# Patient Record
Sex: FEMALE | Race: WHITE | NOT HISPANIC OR LATINO | Employment: UNEMPLOYED | URBAN - METROPOLITAN AREA
[De-identification: names, ages, dates, MRNs, and addresses within clinical notes are randomized per-mention and may not be internally consistent; named-entity substitution may affect disease eponyms.]

---

## 2017-05-08 ENCOUNTER — TELEPHONE (OUTPATIENT)
Dept: FAMILY MEDICINE | Facility: CLINIC | Age: 62
End: 2017-05-08

## 2017-05-08 NOTE — TELEPHONE ENCOUNTER
----- Message from Noemi Biggs sent at 5/1/2017 12:19 PM CDT -----  Contact: self  Patient just diagnosed with breast cancer, is new to area, and needs a pre-op physical.  Surgery is scheduled for 5/11,however paperwork needs to be completed by the 9th.  First available is 5/31.  Please call back at  work

## 2017-06-19 PROBLEM — C50.912 MALIGNANT NEOPLASM OF LEFT FEMALE BREAST: Status: ACTIVE | Noted: 2017-06-19

## 2017-07-06 ENCOUNTER — LAB VISIT (OUTPATIENT)
Dept: LAB | Facility: HOSPITAL | Age: 62
End: 2017-07-06
Attending: INTERNAL MEDICINE
Payer: COMMERCIAL

## 2017-07-06 DIAGNOSIS — C50.912 MALIGNANT NEOPLASM OF LEFT FEMALE BREAST, UNSPECIFIED SITE OF BREAST: ICD-10-CM

## 2017-07-06 LAB
ALBUMIN SERPL BCP-MCNC: 4.2 G/DL
ALP SERPL-CCNC: 82 U/L
ALT SERPL W/O P-5'-P-CCNC: 62 U/L
ANION GAP SERPL CALC-SCNC: 7 MMOL/L
AST SERPL-CCNC: 37 U/L
BASOPHILS # BLD AUTO: 0.04 K/UL
BASOPHILS NFR BLD: 0.5 %
BILIRUB SERPL-MCNC: 0.8 MG/DL
BUN SERPL-MCNC: 10 MG/DL
CALCIUM SERPL-MCNC: 9.1 MG/DL
CANCER AG15-3 SERPL-ACNC: 26.5 U/ML
CEA SERPL-MCNC: 0.7 NG/ML
CHLORIDE SERPL-SCNC: 102 MMOL/L
CO2 SERPL-SCNC: 30 MMOL/L
CREAT SERPL-MCNC: 0.56 MG/DL
CREAT SERPL-MCNC: 0.56 MG/DL
DIFFERENTIAL METHOD: ABNORMAL
EOSINOPHIL # BLD AUTO: 0.5 K/UL
EOSINOPHIL NFR BLD: 6.9 %
ERYTHROCYTE [DISTWIDTH] IN BLOOD BY AUTOMATED COUNT: 14.4 %
EST. GFR  (AFRICAN AMERICAN): >60 ML/MIN/1.73 M^2
EST. GFR  (AFRICAN AMERICAN): >60 ML/MIN/1.73 M^2
EST. GFR  (NON AFRICAN AMERICAN): >60 ML/MIN/1.73 M^2
EST. GFR  (NON AFRICAN AMERICAN): >60 ML/MIN/1.73 M^2
GLUCOSE SERPL-MCNC: 89 MG/DL
HCT VFR BLD AUTO: 39.9 %
HGB BLD-MCNC: 13.1 G/DL
LYMPHOCYTES # BLD AUTO: 2.2 K/UL
LYMPHOCYTES NFR BLD: 29.4 %
MCH RBC QN AUTO: 33 PG
MCHC RBC AUTO-ENTMCNC: 32.8 %
MCV RBC AUTO: 101 FL
MONOCYTES # BLD AUTO: 0.7 K/UL
MONOCYTES NFR BLD: 8.8 %
NEUTROPHILS # BLD AUTO: 4 K/UL
NEUTROPHILS NFR BLD: 54.4 %
NRBC BLD-RTO: 0 /100 WBC
PLATELET # BLD AUTO: 253 K/UL
PMV BLD AUTO: 10.3 FL
POTASSIUM SERPL-SCNC: 4.7 MMOL/L
PROT SERPL-MCNC: 7.6 G/DL
RBC # BLD AUTO: 3.97 M/UL
SODIUM SERPL-SCNC: 139 MMOL/L
WBC # BLD AUTO: 7.41 K/UL

## 2017-07-06 PROCEDURE — 85025 COMPLETE CBC W/AUTO DIFF WBC: CPT

## 2017-07-06 PROCEDURE — 80053 COMPREHEN METABOLIC PANEL: CPT | Mod: PN

## 2017-07-06 PROCEDURE — 86300 IMMUNOASSAY TUMOR CA 15-3: CPT

## 2017-07-06 PROCEDURE — 86300 IMMUNOASSAY TUMOR CA 15-3: CPT | Mod: 91

## 2017-07-06 PROCEDURE — 80053 COMPREHEN METABOLIC PANEL: CPT

## 2017-07-06 PROCEDURE — 82378 CARCINOEMBRYONIC ANTIGEN: CPT | Mod: PN

## 2017-07-06 PROCEDURE — 85025 COMPLETE CBC W/AUTO DIFF WBC: CPT | Mod: PN

## 2017-07-06 PROCEDURE — 82378 CARCINOEMBRYONIC ANTIGEN: CPT

## 2017-07-06 PROCEDURE — 36415 COLL VENOUS BLD VENIPUNCTURE: CPT | Mod: PN

## 2017-07-06 PROCEDURE — 86300 IMMUNOASSAY TUMOR CA 15-3: CPT | Mod: PN

## 2017-07-11 LAB — CANCER AG27-29 SERPL-ACNC: 25.3 U/ML

## 2017-07-12 PROBLEM — R91.8 LUNG MASS: Status: ACTIVE | Noted: 2017-07-12

## 2017-07-27 ENCOUNTER — TELEPHONE (OUTPATIENT)
Dept: PHARMACY | Facility: CLINIC | Age: 62
End: 2017-07-27

## 2017-07-27 NOTE — TELEPHONE ENCOUNTER
Santy: Masoud- Reach out to insurance company to check on the specialty medication for Ibrance- required prior authorization with insurance company. She confirmed it was just a transition fill which will required PA. Submit a prior authorization over the phone (urgent) along with a decision within 24 hours.     Case ID# [id number]   L @1:00pm

## 2017-07-27 NOTE — TELEPHONE ENCOUNTER
Informed patient we have received an order from your provider for Ibrance and will contact you once a benefits investigation is complete with copay information to set up pickup or shipment and [facility] consultation.  feel free to give us a call with  any questions prior to hearing back from us at [phone]._ 7/27/17

## 2017-08-07 ENCOUNTER — LAB VISIT (OUTPATIENT)
Dept: LAB | Facility: HOSPITAL | Age: 62
End: 2017-08-07
Attending: INTERNAL MEDICINE
Payer: COMMERCIAL

## 2017-08-07 DIAGNOSIS — Z17.0 MALIGNANT NEOPLASM OF BREAST IN FEMALE, ESTROGEN RECEPTOR POSITIVE, UNSPECIFIED LATERALITY, UNSPECIFIED SITE OF BREAST: ICD-10-CM

## 2017-08-07 DIAGNOSIS — C50.919 MALIGNANT NEOPLASM OF BREAST IN FEMALE, ESTROGEN RECEPTOR POSITIVE, UNSPECIFIED LATERALITY, UNSPECIFIED SITE OF BREAST: ICD-10-CM

## 2017-08-07 LAB
BASOPHILS # BLD AUTO: 0.04 K/UL
BASOPHILS NFR BLD: 0.6 %
DIFFERENTIAL METHOD: ABNORMAL
EOSINOPHIL # BLD AUTO: 0.3 K/UL
EOSINOPHIL NFR BLD: 4.2 %
ERYTHROCYTE [DISTWIDTH] IN BLOOD BY AUTOMATED COUNT: 13.4 %
HCT VFR BLD AUTO: 38 %
HGB BLD-MCNC: 12.7 G/DL
LYMPHOCYTES # BLD AUTO: 2.2 K/UL
LYMPHOCYTES NFR BLD: 31.2 %
MCH RBC QN AUTO: 33.2 PG
MCHC RBC AUTO-ENTMCNC: 33.4 G/DL
MCV RBC AUTO: 100 FL
MONOCYTES # BLD AUTO: 0.2 K/UL
MONOCYTES NFR BLD: 3.2 %
NEUTROPHILS # BLD AUTO: 4.2 K/UL
NEUTROPHILS NFR BLD: 60.8 %
NRBC BLD-RTO: 0 /100 WBC
PLATELET # BLD AUTO: 277 K/UL
PMV BLD AUTO: 10.3 FL
RBC # BLD AUTO: 3.82 M/UL
WBC # BLD AUTO: 6.93 K/UL

## 2017-08-07 PROCEDURE — 85025 COMPLETE CBC W/AUTO DIFF WBC: CPT | Mod: PN

## 2017-08-07 PROCEDURE — 85025 COMPLETE CBC W/AUTO DIFF WBC: CPT

## 2017-08-07 PROCEDURE — 36415 COLL VENOUS BLD VENIPUNCTURE: CPT | Mod: PN

## 2017-08-11 PROBLEM — C50.112 MALIGNANT NEOPLASM OF CENTRAL PORTION OF LEFT BREAST IN FEMALE, ESTROGEN RECEPTOR POSITIVE: Status: ACTIVE | Noted: 2017-08-11

## 2017-08-11 PROBLEM — C77.1: Status: ACTIVE | Noted: 2017-08-11

## 2017-08-11 PROBLEM — C50.919: Status: ACTIVE | Noted: 2017-08-11

## 2017-08-11 PROBLEM — Z17.0 MALIGNANT NEOPLASM OF CENTRAL PORTION OF LEFT BREAST IN FEMALE, ESTROGEN RECEPTOR POSITIVE: Status: ACTIVE | Noted: 2017-08-11

## 2017-08-14 ENCOUNTER — INFUSION (OUTPATIENT)
Dept: INFUSION THERAPY | Facility: HOSPITAL | Age: 62
End: 2017-08-14
Attending: INTERNAL MEDICINE
Payer: COMMERCIAL

## 2017-08-14 VITALS
TEMPERATURE: 97 F | SYSTOLIC BLOOD PRESSURE: 147 MMHG | RESPIRATION RATE: 16 BRPM | HEART RATE: 76 BPM | BODY MASS INDEX: 29.88 KG/M2 | WEIGHT: 175 LBS | DIASTOLIC BLOOD PRESSURE: 87 MMHG | HEIGHT: 64 IN

## 2017-08-14 DIAGNOSIS — Z17.0 MALIGNANT NEOPLASM OF LEFT BREAST IN FEMALE, ESTROGEN RECEPTOR POSITIVE, UNSPECIFIED SITE OF BREAST: ICD-10-CM

## 2017-08-14 DIAGNOSIS — Z17.0 MALIGNANT NEOPLASM OF CENTRAL PORTION OF LEFT BREAST IN FEMALE, ESTROGEN RECEPTOR POSITIVE: Primary | ICD-10-CM

## 2017-08-14 DIAGNOSIS — C50.912 MALIGNANT NEOPLASM OF LEFT BREAST IN FEMALE, ESTROGEN RECEPTOR POSITIVE, UNSPECIFIED SITE OF BREAST: ICD-10-CM

## 2017-08-14 DIAGNOSIS — C50.112 MALIGNANT NEOPLASM OF CENTRAL PORTION OF LEFT BREAST IN FEMALE, ESTROGEN RECEPTOR POSITIVE: Primary | ICD-10-CM

## 2017-08-14 PROCEDURE — 96402 CHEMO HORMON ANTINEOPL SQ/IM: CPT | Mod: PN

## 2017-08-14 PROCEDURE — 63600175 PHARM REV CODE 636 W HCPCS: Mod: PN | Performed by: INTERNAL MEDICINE

## 2017-08-14 RX ORDER — LAMOTRIGINE 25 MG/1
500 TABLET ORAL
Status: COMPLETED | OUTPATIENT
Start: 2017-08-14 | End: 2017-08-14

## 2017-08-14 RX ADMIN — FULVESTRANT 500 MG: 50 INJECTION INTRAMUSCULAR at 11:08

## 2017-08-14 NOTE — PLAN OF CARE
Problem: Patient Care Overview  Goal: Discharge Needs Assessment  Tolerated injection without any distress.  Reviewed upcoming appointments.  Amb off unit independently by self.

## 2017-08-21 ENCOUNTER — LAB VISIT (OUTPATIENT)
Dept: LAB | Facility: HOSPITAL | Age: 62
End: 2017-08-21
Attending: PHYSICIAN ASSISTANT
Payer: COMMERCIAL

## 2017-08-21 DIAGNOSIS — Z17.0 MALIGNANT NEOPLASM OF BREAST IN FEMALE, ESTROGEN RECEPTOR POSITIVE, UNSPECIFIED LATERALITY, UNSPECIFIED SITE OF BREAST: ICD-10-CM

## 2017-08-21 DIAGNOSIS — C50.919 MALIGNANT NEOPLASM OF BREAST IN FEMALE, ESTROGEN RECEPTOR POSITIVE, UNSPECIFIED LATERALITY, UNSPECIFIED SITE OF BREAST: ICD-10-CM

## 2017-08-21 LAB
ALBUMIN SERPL BCP-MCNC: 3.6 G/DL
ALP SERPL-CCNC: 59 U/L
ALT SERPL W/O P-5'-P-CCNC: 34 U/L
ANION GAP SERPL CALC-SCNC: 6 MMOL/L
AST SERPL-CCNC: 29 U/L
BASOPHILS # BLD AUTO: 0.01 K/UL
BASOPHILS NFR BLD: 0.2 %
BILIRUB SERPL-MCNC: 0.7 MG/DL
BUN SERPL-MCNC: 12 MG/DL
CALCIUM SERPL-MCNC: 9 MG/DL
CHLORIDE SERPL-SCNC: 98 MMOL/L
CO2 SERPL-SCNC: 29 MMOL/L
CREAT SERPL-MCNC: 0.75 MG/DL
DIFFERENTIAL METHOD: ABNORMAL
EOSINOPHIL # BLD AUTO: 0.2 K/UL
EOSINOPHIL NFR BLD: 4.4 %
ERYTHROCYTE [DISTWIDTH] IN BLOOD BY AUTOMATED COUNT: 14.1 %
EST. GFR  (AFRICAN AMERICAN): >60 ML/MIN/1.73 M^2
EST. GFR  (NON AFRICAN AMERICAN): >60 ML/MIN/1.73 M^2
GLUCOSE SERPL-MCNC: 107 MG/DL
HCT VFR BLD AUTO: 30.9 %
HGB BLD-MCNC: 10.3 G/DL
LYMPHOCYTES # BLD AUTO: 1.9 K/UL
LYMPHOCYTES NFR BLD: 39 %
MAGNESIUM SERPL-MCNC: 1.6 MG/DL
MCH RBC QN AUTO: 33.4 PG
MCHC RBC AUTO-ENTMCNC: 33.3 G/DL
MCV RBC AUTO: 100 FL
MONOCYTES # BLD AUTO: 0.2 K/UL
MONOCYTES NFR BLD: 3.7 %
NEUTROPHILS # BLD AUTO: 2.5 K/UL
NEUTROPHILS NFR BLD: 52.7 %
NRBC BLD-RTO: 0 /100 WBC
PLATELET # BLD AUTO: 219 K/UL
PMV BLD AUTO: 9.9 FL
POTASSIUM SERPL-SCNC: 4.8 MMOL/L
PROT SERPL-MCNC: 7.2 G/DL
RBC # BLD AUTO: 3.08 M/UL
SODIUM SERPL-SCNC: 133 MMOL/L
WBC # BLD AUTO: 4.82 K/UL

## 2017-08-21 PROCEDURE — 36415 COLL VENOUS BLD VENIPUNCTURE: CPT | Mod: PN

## 2017-08-21 PROCEDURE — 83735 ASSAY OF MAGNESIUM: CPT | Mod: PN

## 2017-08-21 PROCEDURE — 80053 COMPREHEN METABOLIC PANEL: CPT | Mod: PN

## 2017-08-21 PROCEDURE — 85025 COMPLETE CBC W/AUTO DIFF WBC: CPT | Mod: PN

## 2017-08-21 PROCEDURE — 83735 ASSAY OF MAGNESIUM: CPT

## 2017-08-21 PROCEDURE — 85025 COMPLETE CBC W/AUTO DIFF WBC: CPT

## 2017-08-21 PROCEDURE — 80053 COMPREHEN METABOLIC PANEL: CPT

## 2017-08-28 ENCOUNTER — INFUSION (OUTPATIENT)
Dept: INFUSION THERAPY | Facility: HOSPITAL | Age: 62
End: 2017-08-28
Attending: INTERNAL MEDICINE
Payer: COMMERCIAL

## 2017-08-28 VITALS
BODY MASS INDEX: 28.7 KG/M2 | TEMPERATURE: 99 F | WEIGHT: 168.13 LBS | RESPIRATION RATE: 16 BRPM | SYSTOLIC BLOOD PRESSURE: 132 MMHG | HEART RATE: 104 BPM | HEIGHT: 64 IN | DIASTOLIC BLOOD PRESSURE: 83 MMHG

## 2017-08-28 DIAGNOSIS — Z17.0 MALIGNANT NEOPLASM OF LEFT BREAST IN FEMALE, ESTROGEN RECEPTOR POSITIVE, UNSPECIFIED SITE OF BREAST: Primary | ICD-10-CM

## 2017-08-28 DIAGNOSIS — C50.912 MALIGNANT NEOPLASM OF LEFT BREAST IN FEMALE, ESTROGEN RECEPTOR POSITIVE, UNSPECIFIED SITE OF BREAST: Primary | ICD-10-CM

## 2017-08-28 PROCEDURE — 63600175 PHARM REV CODE 636 W HCPCS: Mod: PN | Performed by: PHYSICIAN ASSISTANT

## 2017-08-28 PROCEDURE — 96402 CHEMO HORMON ANTINEOPL SQ/IM: CPT | Mod: PN

## 2017-08-28 RX ORDER — LAMOTRIGINE 25 MG/1
500 TABLET ORAL
Status: COMPLETED | OUTPATIENT
Start: 2017-08-28 | End: 2017-08-28

## 2017-08-28 RX ADMIN — FULVESTRANT 500 MG: 50 INJECTION INTRAMUSCULAR at 08:08

## 2017-08-28 NOTE — NURSING
Fasoldex injections given per both butoocks. Tolerated treatment. Pt complains of left calf pain. Leg slightly more swollen that right leg. Dr Teague's office notified. Pt to have doppler study done now in outpatient pavilion.

## 2017-09-11 ENCOUNTER — LAB VISIT (OUTPATIENT)
Dept: LAB | Facility: HOSPITAL | Age: 62
End: 2017-09-11
Attending: INTERNAL MEDICINE
Payer: COMMERCIAL

## 2017-09-11 ENCOUNTER — INFUSION (OUTPATIENT)
Dept: INFUSION THERAPY | Facility: HOSPITAL | Age: 62
End: 2017-09-11
Attending: INTERNAL MEDICINE
Payer: COMMERCIAL

## 2017-09-11 VITALS
HEIGHT: 64 IN | HEART RATE: 106 BPM | BODY MASS INDEX: 27.6 KG/M2 | RESPIRATION RATE: 18 BRPM | SYSTOLIC BLOOD PRESSURE: 129 MMHG | OXYGEN SATURATION: 99 % | TEMPERATURE: 98 F | DIASTOLIC BLOOD PRESSURE: 89 MMHG | WEIGHT: 161.69 LBS

## 2017-09-11 DIAGNOSIS — Z17.0 MALIGNANT NEOPLASM OF LEFT BREAST IN FEMALE, ESTROGEN RECEPTOR POSITIVE, UNSPECIFIED SITE OF BREAST: Primary | ICD-10-CM

## 2017-09-11 DIAGNOSIS — Z17.0 MALIGNANT NEOPLASM OF BREAST IN FEMALE, ESTROGEN RECEPTOR POSITIVE, UNSPECIFIED LATERALITY, UNSPECIFIED SITE OF BREAST: ICD-10-CM

## 2017-09-11 DIAGNOSIS — C50.919 MALIGNANT NEOPLASM OF BREAST IN FEMALE, ESTROGEN RECEPTOR POSITIVE, UNSPECIFIED LATERALITY, UNSPECIFIED SITE OF BREAST: ICD-10-CM

## 2017-09-11 DIAGNOSIS — C50.912 MALIGNANT NEOPLASM OF LEFT BREAST IN FEMALE, ESTROGEN RECEPTOR POSITIVE, UNSPECIFIED SITE OF BREAST: Primary | ICD-10-CM

## 2017-09-11 LAB
ANISOCYTOSIS BLD QL SMEAR: SLIGHT
BASOPHILS # BLD AUTO: 0.03 K/UL
BASOPHILS NFR BLD: 1.2 %
DIFFERENTIAL METHOD: ABNORMAL
EOSINOPHIL # BLD AUTO: 0.1 K/UL
EOSINOPHIL NFR BLD: 3.9 %
ERYTHROCYTE [DISTWIDTH] IN BLOOD BY AUTOMATED COUNT: 15.9 %
GIANT PLATELETS BLD QL SMEAR: PRESENT
HCT VFR BLD AUTO: 38 %
HGB BLD-MCNC: 13 G/DL
LYMPHOCYTES # BLD AUTO: 1.2 K/UL
LYMPHOCYTES NFR BLD: 48.1 %
MCH RBC QN AUTO: 32.4 PG
MCHC RBC AUTO-ENTMCNC: 34.2 G/DL
MCV RBC AUTO: 95 FL
MONOCYTES # BLD AUTO: 0.1 K/UL
MONOCYTES NFR BLD: 4.3 %
NEUTROPHILS # BLD AUTO: 1.1 K/UL
NEUTROPHILS NFR BLD: 42.5 %
NRBC BLD-RTO: 0 /100 WBC
PLATELET # BLD AUTO: 288 K/UL
PMV BLD AUTO: 10.2 FL
RBC # BLD AUTO: 4.01 M/UL
WBC # BLD AUTO: 2.58 K/UL

## 2017-09-11 PROCEDURE — 36415 COLL VENOUS BLD VENIPUNCTURE: CPT | Mod: PN

## 2017-09-11 PROCEDURE — 85025 COMPLETE CBC W/AUTO DIFF WBC: CPT

## 2017-09-11 PROCEDURE — 63600175 PHARM REV CODE 636 W HCPCS: Mod: PN | Performed by: PHYSICIAN ASSISTANT

## 2017-09-11 PROCEDURE — 96402 CHEMO HORMON ANTINEOPL SQ/IM: CPT | Mod: PN

## 2017-09-11 PROCEDURE — 85025 COMPLETE CBC W/AUTO DIFF WBC: CPT | Mod: PN

## 2017-09-11 RX ORDER — LAMOTRIGINE 25 MG/1
500 TABLET ORAL
Status: COMPLETED | OUTPATIENT
Start: 2017-09-11 | End: 2017-09-11

## 2017-09-11 RX ADMIN — FULVESTRANT 500 MG: 50 INJECTION INTRAMUSCULAR at 08:09

## 2017-09-12 ENCOUNTER — LAB VISIT (OUTPATIENT)
Dept: LAB | Facility: HOSPITAL | Age: 62
End: 2017-09-12
Attending: INTERNAL MEDICINE
Payer: COMMERCIAL

## 2017-09-12 DIAGNOSIS — C50.919 BREAST CANCER METASTASIZED TO INTRATHORACIC LYMPH NODE, UNSPECIFIED LATERALITY: ICD-10-CM

## 2017-09-12 DIAGNOSIS — C77.1 BREAST CANCER METASTASIZED TO INTRATHORACIC LYMPH NODE, UNSPECIFIED LATERALITY: ICD-10-CM

## 2017-09-12 DIAGNOSIS — C50.112 MALIGNANT NEOPLASM OF CENTRAL PORTION OF LEFT BREAST IN FEMALE, ESTROGEN RECEPTOR POSITIVE: ICD-10-CM

## 2017-09-12 DIAGNOSIS — Z17.0 MALIGNANT NEOPLASM OF CENTRAL PORTION OF LEFT BREAST IN FEMALE, ESTROGEN RECEPTOR POSITIVE: ICD-10-CM

## 2017-09-12 LAB
ALBUMIN SERPL BCP-MCNC: 4 G/DL
ALP SERPL-CCNC: 87 U/L
ALT SERPL W/O P-5'-P-CCNC: 29 U/L
ANION GAP SERPL CALC-SCNC: 10 MMOL/L
ANISOCYTOSIS BLD QL SMEAR: SLIGHT
AST SERPL-CCNC: 28 U/L
BASOPHILS # BLD AUTO: 0.04 K/UL
BASOPHILS NFR BLD: 1 %
BILIRUB SERPL-MCNC: 0.6 MG/DL
BUN SERPL-MCNC: 17 MG/DL
CALCIUM SERPL-MCNC: 9.7 MG/DL
CANCER AG15-3 SERPL-ACNC: 27 U/ML
CEA SERPL-MCNC: 1.1 NG/ML
CHLORIDE SERPL-SCNC: 102 MMOL/L
CO2 SERPL-SCNC: 25 MMOL/L
CREAT SERPL-MCNC: 0.8 MG/DL
DIFFERENTIAL METHOD: ABNORMAL
EOSINOPHIL # BLD AUTO: 0.2 K/UL
EOSINOPHIL NFR BLD: 4.1 %
ERYTHROCYTE [DISTWIDTH] IN BLOOD BY AUTOMATED COUNT: 16 %
EST. GFR  (AFRICAN AMERICAN): >60 ML/MIN/1.73 M^2
EST. GFR  (NON AFRICAN AMERICAN): >60 ML/MIN/1.73 M^2
GIANT PLATELETS BLD QL SMEAR: PRESENT
GLUCOSE SERPL-MCNC: 94 MG/DL
HCT VFR BLD AUTO: 38.3 %
HGB BLD-MCNC: 13.1 G/DL
LYMPHOCYTES # BLD AUTO: 1.7 K/UL
LYMPHOCYTES NFR BLD: 42.6 %
MAGNESIUM SERPL-MCNC: 1.8 MG/DL
MCH RBC QN AUTO: 32.3 PG
MCHC RBC AUTO-ENTMCNC: 34.2 G/DL
MCV RBC AUTO: 95 FL
MONOCYTES # BLD AUTO: 0.2 K/UL
MONOCYTES NFR BLD: 4.1 %
NEUTROPHILS # BLD AUTO: 1.9 K/UL
NEUTROPHILS NFR BLD: 48.2 %
NRBC BLD-RTO: 0 /100 WBC
PLATELET # BLD AUTO: 291 K/UL
PMV BLD AUTO: 10.2 FL
POTASSIUM SERPL-SCNC: 4.4 MMOL/L
PROT SERPL-MCNC: 8 G/DL
RBC # BLD AUTO: 4.05 M/UL
SODIUM SERPL-SCNC: 137 MMOL/L
WBC # BLD AUTO: 3.92 K/UL

## 2017-09-12 PROCEDURE — 82378 CARCINOEMBRYONIC ANTIGEN: CPT | Mod: PN

## 2017-09-12 PROCEDURE — 80053 COMPREHEN METABOLIC PANEL: CPT

## 2017-09-12 PROCEDURE — 85025 COMPLETE CBC W/AUTO DIFF WBC: CPT | Mod: PN

## 2017-09-12 PROCEDURE — 86300 IMMUNOASSAY TUMOR CA 15-3: CPT | Mod: PN

## 2017-09-12 PROCEDURE — 83735 ASSAY OF MAGNESIUM: CPT

## 2017-09-12 PROCEDURE — 86300 IMMUNOASSAY TUMOR CA 15-3: CPT

## 2017-09-12 PROCEDURE — 85025 COMPLETE CBC W/AUTO DIFF WBC: CPT

## 2017-09-12 PROCEDURE — 82378 CARCINOEMBRYONIC ANTIGEN: CPT

## 2017-09-12 PROCEDURE — 83735 ASSAY OF MAGNESIUM: CPT | Mod: PN

## 2017-09-12 PROCEDURE — 80053 COMPREHEN METABOLIC PANEL: CPT | Mod: PN

## 2017-09-12 PROCEDURE — 86300 IMMUNOASSAY TUMOR CA 15-3: CPT | Mod: 91

## 2017-09-15 LAB — CANCER AG27-29 SERPL-ACNC: 30.4 U/ML

## 2017-10-04 ENCOUNTER — LAB VISIT (OUTPATIENT)
Dept: LAB | Facility: HOSPITAL | Age: 62
End: 2017-10-04
Attending: INTERNAL MEDICINE
Payer: COMMERCIAL

## 2017-10-04 DIAGNOSIS — Z17.0 MALIGNANT NEOPLASM OF CENTRAL PORTION OF LEFT BREAST IN FEMALE, ESTROGEN RECEPTOR POSITIVE: ICD-10-CM

## 2017-10-04 DIAGNOSIS — C50.112 MALIGNANT NEOPLASM OF CENTRAL PORTION OF LEFT BREAST IN FEMALE, ESTROGEN RECEPTOR POSITIVE: ICD-10-CM

## 2017-10-04 DIAGNOSIS — C50.919 MALIGNANT NEOPLASM OF BREAST IN FEMALE, ESTROGEN RECEPTOR POSITIVE, UNSPECIFIED LATERALITY, UNSPECIFIED SITE OF BREAST: ICD-10-CM

## 2017-10-04 DIAGNOSIS — Z17.0 MALIGNANT NEOPLASM OF BREAST IN FEMALE, ESTROGEN RECEPTOR POSITIVE, UNSPECIFIED LATERALITY, UNSPECIFIED SITE OF BREAST: ICD-10-CM

## 2017-10-04 LAB
ALBUMIN SERPL BCP-MCNC: 4.1 G/DL
ALP SERPL-CCNC: 68 U/L
ALT SERPL W/O P-5'-P-CCNC: 33 U/L
ANION GAP SERPL CALC-SCNC: 11 MMOL/L
ANISOCYTOSIS BLD QL SMEAR: SLIGHT
AST SERPL-CCNC: 19 U/L
BASOPHILS # BLD AUTO: 0.05 K/UL
BASOPHILS NFR BLD: 1.4 %
BILIRUB SERPL-MCNC: 0.6 MG/DL
BUN SERPL-MCNC: 15 MG/DL
CALCIUM SERPL-MCNC: 10.1 MG/DL
CANCER AG15-3 SERPL-ACNC: 27.2 U/ML
CEA SERPL-MCNC: 0.8 NG/ML
CHLORIDE SERPL-SCNC: 99 MMOL/L
CO2 SERPL-SCNC: 28 MMOL/L
CREAT SERPL-MCNC: 0.73 MG/DL
DIFFERENTIAL METHOD: ABNORMAL
EOSINOPHIL # BLD AUTO: 0.2 K/UL
EOSINOPHIL NFR BLD: 5.5 %
ERYTHROCYTE [DISTWIDTH] IN BLOOD BY AUTOMATED COUNT: 19.3 %
EST. GFR  (AFRICAN AMERICAN): >60 ML/MIN/1.73 M^2
EST. GFR  (NON AFRICAN AMERICAN): >60 ML/MIN/1.73 M^2
GLUCOSE SERPL-MCNC: 115 MG/DL
HCT VFR BLD AUTO: 33.6 %
HGB BLD-MCNC: 11.4 G/DL
HYPOCHROMIA BLD QL SMEAR: ABNORMAL
LYMPHOCYTES # BLD AUTO: 1.7 K/UL
LYMPHOCYTES NFR BLD: 48.1 %
MAGNESIUM SERPL-MCNC: 1.6 MG/DL
MCH RBC QN AUTO: 33.6 PG
MCHC RBC AUTO-ENTMCNC: 33.9 G/DL
MCV RBC AUTO: 99 FL
MONOCYTES # BLD AUTO: 0.1 K/UL
MONOCYTES NFR BLD: 4.1 %
NEUTROPHILS # BLD AUTO: 1.4 K/UL
NEUTROPHILS NFR BLD: 40.9 %
NRBC BLD-RTO: 0 /100 WBC
PLATELET # BLD AUTO: 254 K/UL
PMV BLD AUTO: 10.4 FL
POLYCHROMASIA BLD QL SMEAR: ABNORMAL
POTASSIUM SERPL-SCNC: 4.6 MMOL/L
PROT SERPL-MCNC: 7.7 G/DL
RBC # BLD AUTO: 3.39 M/UL
SODIUM SERPL-SCNC: 138 MMOL/L
WBC # BLD AUTO: 3.45 K/UL

## 2017-10-04 PROCEDURE — 86300 IMMUNOASSAY TUMOR CA 15-3: CPT | Mod: PN

## 2017-10-04 PROCEDURE — 86300 IMMUNOASSAY TUMOR CA 15-3: CPT | Mod: 91

## 2017-10-04 PROCEDURE — 82378 CARCINOEMBRYONIC ANTIGEN: CPT | Mod: PN

## 2017-10-04 PROCEDURE — 82378 CARCINOEMBRYONIC ANTIGEN: CPT

## 2017-10-04 PROCEDURE — 36415 COLL VENOUS BLD VENIPUNCTURE: CPT | Mod: PN

## 2017-10-04 PROCEDURE — 80053 COMPREHEN METABOLIC PANEL: CPT

## 2017-10-04 PROCEDURE — 86300 IMMUNOASSAY TUMOR CA 15-3: CPT

## 2017-10-04 PROCEDURE — 85025 COMPLETE CBC W/AUTO DIFF WBC: CPT | Mod: PN

## 2017-10-04 PROCEDURE — 85025 COMPLETE CBC W/AUTO DIFF WBC: CPT

## 2017-10-04 PROCEDURE — 83735 ASSAY OF MAGNESIUM: CPT

## 2017-10-04 PROCEDURE — 83735 ASSAY OF MAGNESIUM: CPT | Mod: PN

## 2017-10-04 PROCEDURE — 80053 COMPREHEN METABOLIC PANEL: CPT | Mod: PN

## 2017-10-06 ENCOUNTER — TELEPHONE (OUTPATIENT)
Dept: PHARMACY | Facility: CLINIC | Age: 62
End: 2017-10-06

## 2017-10-06 ENCOUNTER — TELEPHONE (OUTPATIENT)
Dept: INFUSION THERAPY | Facility: HOSPITAL | Age: 62
End: 2017-10-06

## 2017-10-06 NOTE — TELEPHONE ENCOUNTER
NANVM-[facility] received a prescription for Ibrance and it will require a prior authorization with their insurance company. We will update patient of status as more information is received.

## 2017-10-06 NOTE — TELEPHONE ENCOUNTER
[10/6/2017 1:40 PM] Tea Tena:   D/c Faslodex on Georgia Jeffrey [id number] will be starting femara and continuing Ibrance

## 2017-10-08 LAB — CANCER AG27-29 SERPL-ACNC: 27.3 U/ML

## 2017-10-09 ENCOUNTER — LAB VISIT (OUTPATIENT)
Dept: LAB | Facility: HOSPITAL | Age: 62
End: 2017-10-09
Attending: INTERNAL MEDICINE
Payer: COMMERCIAL

## 2017-10-09 DIAGNOSIS — Z17.0 MALIGNANT NEOPLASM OF BREAST IN FEMALE, ESTROGEN RECEPTOR POSITIVE, UNSPECIFIED LATERALITY, UNSPECIFIED SITE OF BREAST: ICD-10-CM

## 2017-10-09 DIAGNOSIS — C50.919 MALIGNANT NEOPLASM OF BREAST IN FEMALE, ESTROGEN RECEPTOR POSITIVE, UNSPECIFIED LATERALITY, UNSPECIFIED SITE OF BREAST: ICD-10-CM

## 2017-10-09 LAB
ALBUMIN SERPL BCP-MCNC: 4.1 G/DL
ALP SERPL-CCNC: 57 U/L
ALT SERPL W/O P-5'-P-CCNC: 28 U/L
ANION GAP SERPL CALC-SCNC: 8 MMOL/L
ANISOCYTOSIS BLD QL SMEAR: ABNORMAL
AST SERPL-CCNC: 22 U/L
BASOPHILS # BLD AUTO: 0.03 K/UL
BASOPHILS NFR BLD: 0.7 %
BILIRUB SERPL-MCNC: 0.6 MG/DL
BUN SERPL-MCNC: 14 MG/DL
CALCIUM SERPL-MCNC: 9.6 MG/DL
CHLORIDE SERPL-SCNC: 102 MMOL/L
CO2 SERPL-SCNC: 28 MMOL/L
CREAT SERPL-MCNC: 0.94 MG/DL
DIFFERENTIAL METHOD: ABNORMAL
EOSINOPHIL # BLD AUTO: 0.1 K/UL
EOSINOPHIL NFR BLD: 2.6 %
ERYTHROCYTE [DISTWIDTH] IN BLOOD BY AUTOMATED COUNT: 19.9 %
EST. GFR  (AFRICAN AMERICAN): >60 ML/MIN/1.73 M^2
EST. GFR  (NON AFRICAN AMERICAN): >60 ML/MIN/1.73 M^2
GLUCOSE SERPL-MCNC: 91 MG/DL
HCT VFR BLD AUTO: 31.8 %
HGB BLD-MCNC: 10.9 G/DL
LYMPHOCYTES # BLD AUTO: 2 K/UL
LYMPHOCYTES NFR BLD: 46.9 %
MAGNESIUM SERPL-MCNC: 1.8 MG/DL
MCH RBC QN AUTO: 34.3 PG
MCHC RBC AUTO-ENTMCNC: 34.3 G/DL
MCV RBC AUTO: 100 FL
MONOCYTES # BLD AUTO: 0.3 K/UL
MONOCYTES NFR BLD: 6.8 %
NEUTROPHILS # BLD AUTO: 1.8 K/UL
NEUTROPHILS NFR BLD: 43 %
NRBC BLD-RTO: 0 /100 WBC
PLATELET # BLD AUTO: 212 K/UL
PMV BLD AUTO: 10.5 FL
POLYCHROMASIA BLD QL SMEAR: ABNORMAL
POTASSIUM SERPL-SCNC: 5.1 MMOL/L
PROT SERPL-MCNC: 7.7 G/DL
RBC # BLD AUTO: 3.18 M/UL
SODIUM SERPL-SCNC: 138 MMOL/L
WBC # BLD AUTO: 4.26 K/UL

## 2017-10-09 PROCEDURE — 80053 COMPREHEN METABOLIC PANEL: CPT | Mod: PN

## 2017-10-09 PROCEDURE — 85025 COMPLETE CBC W/AUTO DIFF WBC: CPT | Mod: PN

## 2017-10-09 PROCEDURE — 83735 ASSAY OF MAGNESIUM: CPT

## 2017-10-09 PROCEDURE — 80053 COMPREHEN METABOLIC PANEL: CPT

## 2017-10-09 PROCEDURE — 83735 ASSAY OF MAGNESIUM: CPT | Mod: PN

## 2017-10-09 PROCEDURE — 36415 COLL VENOUS BLD VENIPUNCTURE: CPT | Mod: PN

## 2017-10-09 PROCEDURE — 85025 COMPLETE CBC W/AUTO DIFF WBC: CPT

## 2017-10-10 NOTE — TELEPHONE ENCOUNTER
DOCUMENTATION ONLY:  Prior authorization for Ibrance approved from 10/10/2017 to 04/10/2018    Case Id: [id number]    Co-pay: $0    Patient Assistance IS NOT required

## 2017-10-11 ENCOUNTER — TELEPHONE (OUTPATIENT)
Dept: PHARMACY | Facility: CLINIC | Age: 62
End: 2017-10-11

## 2017-10-11 NOTE — TELEPHONE ENCOUNTER
Patient reports she has been tolerating Ibrance well with no missed doses.  She has 1 more day of medication in this current cycle.  Advised patient that she can have grapefruit juice during her off week.

## 2017-10-23 ENCOUNTER — LAB VISIT (OUTPATIENT)
Dept: LAB | Facility: HOSPITAL | Age: 62
End: 2017-10-23
Attending: INTERNAL MEDICINE
Payer: COMMERCIAL

## 2017-10-23 DIAGNOSIS — C77.1 BREAST CANCER METASTASIZED TO INTRATHORACIC LYMPH NODE, UNSPECIFIED LATERALITY: ICD-10-CM

## 2017-10-23 DIAGNOSIS — C50.919 BREAST CANCER METASTASIZED TO INTRATHORACIC LYMPH NODE, UNSPECIFIED LATERALITY: ICD-10-CM

## 2017-10-23 LAB
ALBUMIN SERPL BCP-MCNC: 3.8 G/DL
ALP SERPL-CCNC: 62 U/L
ALT SERPL W/O P-5'-P-CCNC: 27 U/L
ANION GAP SERPL CALC-SCNC: 8 MMOL/L
ANISOCYTOSIS BLD QL SMEAR: ABNORMAL
AST SERPL-CCNC: 21 U/L
BASOPHILS NFR BLD: 1 %
BILIRUB SERPL-MCNC: 0.5 MG/DL
BUN SERPL-MCNC: 12 MG/DL
CALCIUM SERPL-MCNC: 9.1 MG/DL
CHLORIDE SERPL-SCNC: 104 MMOL/L
CO2 SERPL-SCNC: 27 MMOL/L
CREAT SERPL-MCNC: 0.83 MG/DL
DIFFERENTIAL METHOD: ABNORMAL
EOSINOPHIL NFR BLD: 1 %
ERYTHROCYTE [DISTWIDTH] IN BLOOD BY AUTOMATED COUNT: 22.2 %
EST. GFR  (AFRICAN AMERICAN): >60 ML/MIN/1.73 M^2
EST. GFR  (NON AFRICAN AMERICAN): >60 ML/MIN/1.73 M^2
GLUCOSE SERPL-MCNC: 86 MG/DL
HCT VFR BLD AUTO: 29 %
HGB BLD-MCNC: 10.1 G/DL
HYPOCHROMIA BLD QL SMEAR: ABNORMAL
LYMPHOCYTES NFR BLD: 47 %
MAGNESIUM SERPL-MCNC: 1.6 MG/DL
MCH RBC QN AUTO: 36.1 PG
MCHC RBC AUTO-ENTMCNC: 34.8 G/DL
MCV RBC AUTO: 104 FL
MONOCYTES NFR BLD: 3 %
NEUTROPHILS # BLD AUTO: 1.9 K/UL
NEUTROPHILS NFR BLD: 48 %
NRBC BLD-RTO: 0 /100 WBC
PLATELET # BLD AUTO: 236 K/UL
PLATELET BLD QL SMEAR: ABNORMAL
PMV BLD AUTO: 10 FL
POLYCHROMASIA BLD QL SMEAR: ABNORMAL
POTASSIUM SERPL-SCNC: 4 MMOL/L
PROT SERPL-MCNC: 7.1 G/DL
RBC # BLD AUTO: 2.8 M/UL
SODIUM SERPL-SCNC: 139 MMOL/L
WBC # BLD AUTO: 4.04 K/UL

## 2017-10-23 PROCEDURE — 80053 COMPREHEN METABOLIC PANEL: CPT

## 2017-10-23 PROCEDURE — 83735 ASSAY OF MAGNESIUM: CPT | Mod: PN

## 2017-10-23 PROCEDURE — 83735 ASSAY OF MAGNESIUM: CPT

## 2017-10-23 PROCEDURE — 85007 BL SMEAR W/DIFF WBC COUNT: CPT | Mod: PN

## 2017-10-23 PROCEDURE — 85027 COMPLETE CBC AUTOMATED: CPT

## 2017-10-23 PROCEDURE — 85007 BL SMEAR W/DIFF WBC COUNT: CPT

## 2017-10-23 PROCEDURE — 85027 COMPLETE CBC AUTOMATED: CPT | Mod: PN

## 2017-10-23 PROCEDURE — 80053 COMPREHEN METABOLIC PANEL: CPT | Mod: PN

## 2017-10-23 PROCEDURE — 36415 COLL VENOUS BLD VENIPUNCTURE: CPT | Mod: PN

## 2017-10-31 ENCOUNTER — TELEPHONE (OUTPATIENT)
Dept: PHARMACY | Facility: CLINIC | Age: 62
End: 2017-10-31

## 2017-10-31 ENCOUNTER — LAB VISIT (OUTPATIENT)
Dept: LAB | Facility: HOSPITAL | Age: 62
End: 2017-10-31
Attending: INTERNAL MEDICINE
Payer: COMMERCIAL

## 2017-10-31 DIAGNOSIS — C50.919 BREAST CANCER METASTASIZED TO INTRATHORACIC LYMPH NODE, UNSPECIFIED LATERALITY: ICD-10-CM

## 2017-10-31 DIAGNOSIS — C77.1 BREAST CANCER METASTASIZED TO INTRATHORACIC LYMPH NODE, UNSPECIFIED LATERALITY: ICD-10-CM

## 2017-10-31 LAB
ANISOCYTOSIS BLD QL SMEAR: ABNORMAL
BASOPHILS NFR BLD: 3 %
DIFFERENTIAL METHOD: ABNORMAL
EOSINOPHIL NFR BLD: 1 %
ERYTHROCYTE [DISTWIDTH] IN BLOOD BY AUTOMATED COUNT: 22.5 %
HCT VFR BLD AUTO: 30.8 %
HGB BLD-MCNC: 10.9 G/DL
HYPOCHROMIA BLD QL SMEAR: ABNORMAL
LYMPHOCYTES NFR BLD: 44 %
MCH RBC QN AUTO: 37.6 PG
MCHC RBC AUTO-ENTMCNC: 35.4 G/DL
MCV RBC AUTO: 106 FL
MONOCYTES NFR BLD: 6 %
NEUTROPHILS # BLD AUTO: 1.5 K/UL
NEUTROPHILS NFR BLD: 46 %
NRBC BLD-RTO: 0 /100 WBC
PLATELET # BLD AUTO: 306 K/UL
PLATELET BLD QL SMEAR: ABNORMAL
PMV BLD AUTO: 10.4 FL
RBC # BLD AUTO: 2.9 M/UL
WBC # BLD AUTO: 3.25 K/UL

## 2017-10-31 PROCEDURE — 36415 COLL VENOUS BLD VENIPUNCTURE: CPT | Mod: PN

## 2017-10-31 PROCEDURE — 85007 BL SMEAR W/DIFF WBC COUNT: CPT

## 2017-10-31 PROCEDURE — 85027 COMPLETE CBC AUTOMATED: CPT | Mod: PN

## 2017-10-31 PROCEDURE — 85027 COMPLETE CBC AUTOMATED: CPT

## 2017-10-31 PROCEDURE — 85007 BL SMEAR W/DIFF WBC COUNT: CPT | Mod: PN

## 2017-10-31 NOTE — TELEPHONE ENCOUNTER
Patient reports no missed doses of Ibrance/letrozole.  She reports feeling miserable since switching to Ibrance/letrozole regimen. She has been suffering with nausea with no relief from ondansetron.  She reports joint aches, extreme fatigue, headaches, weight loss, and difficulty thinking.  Advised patient to continue to log side effects to bring to appointment to discuss with provider.

## 2017-11-13 ENCOUNTER — LAB VISIT (OUTPATIENT)
Dept: LAB | Facility: HOSPITAL | Age: 62
End: 2017-11-13
Attending: INTERNAL MEDICINE
Payer: COMMERCIAL

## 2017-11-13 DIAGNOSIS — Z17.0 MALIGNANT NEOPLASM OF CENTRAL PORTION OF LEFT BREAST IN FEMALE, ESTROGEN RECEPTOR POSITIVE: ICD-10-CM

## 2017-11-13 DIAGNOSIS — C50.919 BREAST CANCER METASTASIZED TO INTRATHORACIC LYMPH NODE, UNSPECIFIED LATERALITY: ICD-10-CM

## 2017-11-13 DIAGNOSIS — C77.1 BREAST CANCER METASTASIZED TO INTRATHORACIC LYMPH NODE, UNSPECIFIED LATERALITY: ICD-10-CM

## 2017-11-13 DIAGNOSIS — C50.112 MALIGNANT NEOPLASM OF CENTRAL PORTION OF LEFT BREAST IN FEMALE, ESTROGEN RECEPTOR POSITIVE: ICD-10-CM

## 2017-11-13 LAB
ALBUMIN SERPL BCP-MCNC: 4.1 G/DL
ALP SERPL-CCNC: 68 U/L
ALT SERPL W/O P-5'-P-CCNC: 33 U/L
ANION GAP SERPL CALC-SCNC: 10 MMOL/L
AST SERPL-CCNC: 27 U/L
BASOPHILS # BLD AUTO: 0.03 K/UL
BASOPHILS NFR BLD: 0.7 %
BILIRUB SERPL-MCNC: 0.5 MG/DL
BUN SERPL-MCNC: 11 MG/DL
CALCIUM SERPL-MCNC: 9.6 MG/DL
CANCER AG15-3 SERPL-ACNC: 31.8 U/ML
CEA SERPL-MCNC: 0.8 NG/ML
CHLORIDE SERPL-SCNC: 102 MMOL/L
CO2 SERPL-SCNC: 26 MMOL/L
CREAT SERPL-MCNC: 0.65 MG/DL
DIFFERENTIAL METHOD: ABNORMAL
EOSINOPHIL # BLD AUTO: 0.1 K/UL
EOSINOPHIL NFR BLD: 2 %
ERYTHROCYTE [DISTWIDTH] IN BLOOD BY AUTOMATED COUNT: 22.6 %
EST. GFR  (AFRICAN AMERICAN): >60 ML/MIN/1.73 M^2
EST. GFR  (NON AFRICAN AMERICAN): >60 ML/MIN/1.73 M^2
GLUCOSE SERPL-MCNC: 84 MG/DL
HCT VFR BLD AUTO: 30.6 %
HGB BLD-MCNC: 10.8 G/DL
LYMPHOCYTES # BLD AUTO: 2.3 K/UL
LYMPHOCYTES NFR BLD: 50.7 %
MAGNESIUM SERPL-MCNC: 1.6 MG/DL
MCH RBC QN AUTO: 39 PG
MCHC RBC AUTO-ENTMCNC: 35.3 G/DL
MCV RBC AUTO: 111 FL
MONOCYTES # BLD AUTO: 0.5 K/UL
MONOCYTES NFR BLD: 10.3 %
NEUTROPHILS # BLD AUTO: 1.6 K/UL
NEUTROPHILS NFR BLD: 36.3 %
NRBC BLD-RTO: 0 /100 WBC
PLATELET # BLD AUTO: 292 K/UL
PMV BLD AUTO: 10.3 FL
POTASSIUM SERPL-SCNC: 4.2 MMOL/L
PROT SERPL-MCNC: 7.7 G/DL
RBC # BLD AUTO: 2.77 M/UL
SODIUM SERPL-SCNC: 138 MMOL/L
WBC # BLD AUTO: 4.46 K/UL

## 2017-11-13 PROCEDURE — 80053 COMPREHEN METABOLIC PANEL: CPT

## 2017-11-13 PROCEDURE — 85025 COMPLETE CBC W/AUTO DIFF WBC: CPT | Mod: PN

## 2017-11-13 PROCEDURE — 82378 CARCINOEMBRYONIC ANTIGEN: CPT

## 2017-11-13 PROCEDURE — 83735 ASSAY OF MAGNESIUM: CPT | Mod: PN

## 2017-11-13 PROCEDURE — 86300 IMMUNOASSAY TUMOR CA 15-3: CPT | Mod: PN

## 2017-11-13 PROCEDURE — 86300 IMMUNOASSAY TUMOR CA 15-3: CPT | Mod: 91

## 2017-11-13 PROCEDURE — 83735 ASSAY OF MAGNESIUM: CPT

## 2017-11-13 PROCEDURE — 85025 COMPLETE CBC W/AUTO DIFF WBC: CPT

## 2017-11-13 PROCEDURE — 86300 IMMUNOASSAY TUMOR CA 15-3: CPT

## 2017-11-13 PROCEDURE — 80053 COMPREHEN METABOLIC PANEL: CPT | Mod: PN

## 2017-11-13 PROCEDURE — 82378 CARCINOEMBRYONIC ANTIGEN: CPT | Mod: PN

## 2017-11-16 LAB — CANCER AG27-29 SERPL-ACNC: 34.6 U/ML

## 2017-11-17 PROBLEM — R11.15 PERSISTENT VOMITING: Status: ACTIVE | Noted: 2017-11-17

## 2017-11-17 PROBLEM — M54.9 BACK PAIN: Status: ACTIVE | Noted: 2017-11-17

## 2017-11-20 ENCOUNTER — LAB VISIT (OUTPATIENT)
Dept: LAB | Facility: HOSPITAL | Age: 62
End: 2017-11-20
Attending: INTERNAL MEDICINE
Payer: COMMERCIAL

## 2017-11-20 DIAGNOSIS — C77.1 BREAST CANCER METASTASIZED TO INTRATHORACIC LYMPH NODE, UNSPECIFIED LATERALITY: ICD-10-CM

## 2017-11-20 DIAGNOSIS — C50.919 BREAST CANCER METASTASIZED TO INTRATHORACIC LYMPH NODE, UNSPECIFIED LATERALITY: ICD-10-CM

## 2017-11-20 LAB
ALBUMIN SERPL BCP-MCNC: 4 G/DL
ALP SERPL-CCNC: 58 U/L
ALT SERPL W/O P-5'-P-CCNC: 47 U/L
ANION GAP SERPL CALC-SCNC: 9 MMOL/L
AST SERPL-CCNC: 32 U/L
BASOPHILS # BLD AUTO: 0.06 K/UL
BASOPHILS NFR BLD: 0.9 %
BILIRUB SERPL-MCNC: 0.6 MG/DL
BUN SERPL-MCNC: 13 MG/DL
CALCIUM SERPL-MCNC: 9.5 MG/DL
CHLORIDE SERPL-SCNC: 101 MMOL/L
CO2 SERPL-SCNC: 26 MMOL/L
CREAT SERPL-MCNC: 0.72 MG/DL
DIFFERENTIAL METHOD: ABNORMAL
EOSINOPHIL # BLD AUTO: 0.2 K/UL
EOSINOPHIL NFR BLD: 2.7 %
ERYTHROCYTE [DISTWIDTH] IN BLOOD BY AUTOMATED COUNT: 20.5 %
EST. GFR  (AFRICAN AMERICAN): >60 ML/MIN/1.73 M^2
EST. GFR  (NON AFRICAN AMERICAN): >60 ML/MIN/1.73 M^2
GLUCOSE SERPL-MCNC: 103 MG/DL
HCT VFR BLD AUTO: 32.7 %
HGB BLD-MCNC: 11.5 G/DL
LYMPHOCYTES # BLD AUTO: 2.7 K/UL
LYMPHOCYTES NFR BLD: 41.8 %
MAGNESIUM SERPL-MCNC: 1.7 MG/DL
MCH RBC QN AUTO: 39.5 PG
MCHC RBC AUTO-ENTMCNC: 35.2 G/DL
MCV RBC AUTO: 112 FL
MONOCYTES # BLD AUTO: 0.6 K/UL
MONOCYTES NFR BLD: 9.2 %
NEUTROPHILS # BLD AUTO: 2.9 K/UL
NEUTROPHILS NFR BLD: 45.4 %
NRBC BLD-RTO: 0 /100 WBC
PLATELET # BLD AUTO: 278 K/UL
PMV BLD AUTO: 9.9 FL
POTASSIUM SERPL-SCNC: 5 MMOL/L
PROT SERPL-MCNC: 7.6 G/DL
RBC # BLD AUTO: 2.91 M/UL
SODIUM SERPL-SCNC: 136 MMOL/L
WBC # BLD AUTO: 6.39 K/UL

## 2017-11-20 PROCEDURE — 85025 COMPLETE CBC W/AUTO DIFF WBC: CPT | Mod: PN

## 2017-11-20 PROCEDURE — 83735 ASSAY OF MAGNESIUM: CPT | Mod: PN

## 2017-11-20 PROCEDURE — 83735 ASSAY OF MAGNESIUM: CPT

## 2017-11-20 PROCEDURE — 85025 COMPLETE CBC W/AUTO DIFF WBC: CPT

## 2017-11-20 PROCEDURE — 80053 COMPREHEN METABOLIC PANEL: CPT | Mod: PN

## 2017-11-20 PROCEDURE — 36415 COLL VENOUS BLD VENIPUNCTURE: CPT | Mod: PN

## 2017-11-20 PROCEDURE — 80053 COMPREHEN METABOLIC PANEL: CPT

## 2017-11-27 ENCOUNTER — TELEPHONE (OUTPATIENT)
Dept: PHARMACY | Facility: CLINIC | Age: 62
End: 2017-11-27

## 2017-11-27 NOTE — TELEPHONE ENCOUNTER
Refill readiness for Ibrance confirmed with patient; name/ confirmed; no missed doses; no new medications; no side effects noted; address confirmed for  shipment and  delivery.

## 2017-11-29 ENCOUNTER — LAB VISIT (OUTPATIENT)
Dept: LAB | Facility: HOSPITAL | Age: 62
End: 2017-11-29
Attending: INTERNAL MEDICINE
Payer: COMMERCIAL

## 2017-11-29 DIAGNOSIS — C77.1 BREAST CANCER METASTASIZED TO INTRATHORACIC LYMPH NODE, UNSPECIFIED LATERALITY: ICD-10-CM

## 2017-11-29 DIAGNOSIS — C50.919 BREAST CANCER METASTASIZED TO INTRATHORACIC LYMPH NODE, UNSPECIFIED LATERALITY: ICD-10-CM

## 2017-11-29 LAB
ALBUMIN SERPL BCP-MCNC: 4.1 G/DL
ALP SERPL-CCNC: 59 U/L
ALT SERPL W/O P-5'-P-CCNC: 48 U/L
ANION GAP SERPL CALC-SCNC: 11 MMOL/L
AST SERPL-CCNC: 27 U/L
BASOPHILS # BLD AUTO: 0.07 K/UL
BASOPHILS NFR BLD: 1 %
BILIRUB SERPL-MCNC: 0.6 MG/DL
BUN SERPL-MCNC: 10 MG/DL
CALCIUM SERPL-MCNC: 9.4 MG/DL
CHLORIDE SERPL-SCNC: 102 MMOL/L
CO2 SERPL-SCNC: 27 MMOL/L
CREAT SERPL-MCNC: 0.61 MG/DL
DIFFERENTIAL METHOD: ABNORMAL
EOSINOPHIL # BLD AUTO: 0.4 K/UL
EOSINOPHIL NFR BLD: 5.1 %
ERYTHROCYTE [DISTWIDTH] IN BLOOD BY AUTOMATED COUNT: 17.1 %
EST. GFR  (AFRICAN AMERICAN): >60 ML/MIN/1.73 M^2
EST. GFR  (NON AFRICAN AMERICAN): >60 ML/MIN/1.73 M^2
GLUCOSE SERPL-MCNC: 100 MG/DL
HCT VFR BLD AUTO: 34.9 %
HGB BLD-MCNC: 12.1 G/DL
LYMPHOCYTES # BLD AUTO: 2.5 K/UL
LYMPHOCYTES NFR BLD: 34.2 %
MAGNESIUM SERPL-MCNC: 1.7 MG/DL
MCH RBC QN AUTO: 39.3 PG
MCHC RBC AUTO-ENTMCNC: 34.7 G/DL
MCV RBC AUTO: 113 FL
MONOCYTES # BLD AUTO: 0.7 K/UL
MONOCYTES NFR BLD: 9.3 %
NEUTROPHILS # BLD AUTO: 3.7 K/UL
NEUTROPHILS NFR BLD: 50.4 %
NRBC BLD-RTO: 0 /100 WBC
PLATELET # BLD AUTO: 270 K/UL
PMV BLD AUTO: 10.6 FL
POTASSIUM SERPL-SCNC: 4.4 MMOL/L
PROT SERPL-MCNC: 7.7 G/DL
RBC # BLD AUTO: 3.08 M/UL
SODIUM SERPL-SCNC: 140 MMOL/L
WBC # BLD AUTO: 7.28 K/UL

## 2017-11-29 PROCEDURE — 80053 COMPREHEN METABOLIC PANEL: CPT

## 2017-11-29 PROCEDURE — 85025 COMPLETE CBC W/AUTO DIFF WBC: CPT | Mod: PN

## 2017-11-29 PROCEDURE — 85025 COMPLETE CBC W/AUTO DIFF WBC: CPT

## 2017-11-29 PROCEDURE — 36415 COLL VENOUS BLD VENIPUNCTURE: CPT | Mod: PN

## 2017-11-29 PROCEDURE — 83735 ASSAY OF MAGNESIUM: CPT

## 2017-11-29 PROCEDURE — 83735 ASSAY OF MAGNESIUM: CPT | Mod: PN

## 2017-11-29 PROCEDURE — 80053 COMPREHEN METABOLIC PANEL: CPT | Mod: PN

## 2017-12-04 ENCOUNTER — LAB VISIT (OUTPATIENT)
Dept: LAB | Facility: HOSPITAL | Age: 62
End: 2017-12-04
Attending: INTERNAL MEDICINE
Payer: COMMERCIAL

## 2017-12-04 DIAGNOSIS — C77.1 BREAST CANCER METASTASIZED TO INTRATHORACIC LYMPH NODE, UNSPECIFIED LATERALITY: ICD-10-CM

## 2017-12-04 DIAGNOSIS — C50.919 BREAST CANCER METASTASIZED TO INTRATHORACIC LYMPH NODE, UNSPECIFIED LATERALITY: ICD-10-CM

## 2017-12-04 LAB
ALBUMIN SERPL BCP-MCNC: 4.3 G/DL
ALP SERPL-CCNC: 78 U/L
ALT SERPL W/O P-5'-P-CCNC: 61 U/L
ANION GAP SERPL CALC-SCNC: 8 MMOL/L
AST SERPL-CCNC: 43 U/L
BASOPHILS # BLD AUTO: 0.09 K/UL
BASOPHILS NFR BLD: 1.1 %
BILIRUB SERPL-MCNC: 0.6 MG/DL
BUN SERPL-MCNC: 15 MG/DL
CALCIUM SERPL-MCNC: 10 MG/DL
CHLORIDE SERPL-SCNC: 102 MMOL/L
CO2 SERPL-SCNC: 29 MMOL/L
CREAT SERPL-MCNC: 0.63 MG/DL
DIFFERENTIAL METHOD: ABNORMAL
EOSINOPHIL # BLD AUTO: 0.5 K/UL
EOSINOPHIL NFR BLD: 5.9 %
ERYTHROCYTE [DISTWIDTH] IN BLOOD BY AUTOMATED COUNT: 14.9 %
EST. GFR  (AFRICAN AMERICAN): >60 ML/MIN/1.73 M^2
EST. GFR  (NON AFRICAN AMERICAN): >60 ML/MIN/1.73 M^2
GLUCOSE SERPL-MCNC: 96 MG/DL
HCT VFR BLD AUTO: 35.7 %
HGB BLD-MCNC: 12.5 G/DL
LYMPHOCYTES # BLD AUTO: 3 K/UL
LYMPHOCYTES NFR BLD: 35.5 %
MAGNESIUM SERPL-MCNC: 1.7 MG/DL
MCH RBC QN AUTO: 39.6 PG
MCHC RBC AUTO-ENTMCNC: 35 G/DL
MCV RBC AUTO: 113 FL
MONOCYTES # BLD AUTO: 0.8 K/UL
MONOCYTES NFR BLD: 9.7 %
NEUTROPHILS # BLD AUTO: 4 K/UL
NEUTROPHILS NFR BLD: 47.8 %
NRBC BLD-RTO: 0 /100 WBC
PLATELET # BLD AUTO: 285 K/UL
PMV BLD AUTO: 10.4 FL
POTASSIUM SERPL-SCNC: 5.1 MMOL/L
PROT SERPL-MCNC: 8.1 G/DL
RBC # BLD AUTO: 3.16 M/UL
SODIUM SERPL-SCNC: 139 MMOL/L
WBC # BLD AUTO: 8.45 K/UL

## 2017-12-04 PROCEDURE — 85025 COMPLETE CBC W/AUTO DIFF WBC: CPT

## 2017-12-04 PROCEDURE — 83735 ASSAY OF MAGNESIUM: CPT | Mod: PN

## 2017-12-04 PROCEDURE — 85025 COMPLETE CBC W/AUTO DIFF WBC: CPT | Mod: PN

## 2017-12-04 PROCEDURE — 83735 ASSAY OF MAGNESIUM: CPT

## 2017-12-04 PROCEDURE — 80053 COMPREHEN METABOLIC PANEL: CPT

## 2017-12-04 PROCEDURE — 80053 COMPREHEN METABOLIC PANEL: CPT | Mod: PN

## 2017-12-04 PROCEDURE — 36415 COLL VENOUS BLD VENIPUNCTURE: CPT | Mod: PN

## 2017-12-06 PROBLEM — C79.51 BREAST CANCER METASTASIZED TO BONE: Status: ACTIVE | Noted: 2017-12-06

## 2017-12-06 PROBLEM — C50.919 BREAST CANCER METASTASIZED TO BONE: Status: ACTIVE | Noted: 2017-12-06

## 2017-12-12 ENCOUNTER — LAB VISIT (OUTPATIENT)
Dept: LAB | Facility: HOSPITAL | Age: 62
End: 2017-12-12
Attending: INTERNAL MEDICINE
Payer: COMMERCIAL

## 2017-12-12 DIAGNOSIS — Z17.0 MALIGNANT NEOPLASM OF CENTRAL PORTION OF LEFT BREAST IN FEMALE, ESTROGEN RECEPTOR POSITIVE: ICD-10-CM

## 2017-12-12 DIAGNOSIS — C50.919 BREAST CANCER METASTASIZED TO INTRATHORACIC LYMPH NODE, UNSPECIFIED LATERALITY: ICD-10-CM

## 2017-12-12 DIAGNOSIS — C77.1 BREAST CANCER METASTASIZED TO INTRATHORACIC LYMPH NODE, UNSPECIFIED LATERALITY: ICD-10-CM

## 2017-12-12 DIAGNOSIS — C50.112 MALIGNANT NEOPLASM OF CENTRAL PORTION OF LEFT BREAST IN FEMALE, ESTROGEN RECEPTOR POSITIVE: ICD-10-CM

## 2017-12-12 LAB
ALBUMIN SERPL BCP-MCNC: 4.5 G/DL
ALP SERPL-CCNC: 71 U/L
ALT SERPL W/O P-5'-P-CCNC: 60 U/L
ANION GAP SERPL CALC-SCNC: 9 MMOL/L
AST SERPL-CCNC: 33 U/L
BASOPHILS # BLD AUTO: 0.07 K/UL
BASOPHILS NFR BLD: 0.8 %
BILIRUB SERPL-MCNC: 0.6 MG/DL
BUN SERPL-MCNC: 13 MG/DL
CALCIUM SERPL-MCNC: 9.8 MG/DL
CANCER AG15-3 SERPL-ACNC: 32.2 U/ML
CEA SERPL-MCNC: 0.9 NG/ML
CHLORIDE SERPL-SCNC: 99 MMOL/L
CO2 SERPL-SCNC: 28 MMOL/L
CREAT SERPL-MCNC: 0.58 MG/DL
DIFFERENTIAL METHOD: ABNORMAL
EOSINOPHIL # BLD AUTO: 0.6 K/UL
EOSINOPHIL NFR BLD: 7 %
ERYTHROCYTE [DISTWIDTH] IN BLOOD BY AUTOMATED COUNT: 13.4 %
EST. GFR  (AFRICAN AMERICAN): >60 ML/MIN/1.73 M^2
EST. GFR  (NON AFRICAN AMERICAN): >60 ML/MIN/1.73 M^2
GLUCOSE SERPL-MCNC: 95 MG/DL
HCT VFR BLD AUTO: 36.4 %
HGB BLD-MCNC: 12.9 G/DL
LYMPHOCYTES # BLD AUTO: 3.1 K/UL
LYMPHOCYTES NFR BLD: 37.5 %
MAGNESIUM SERPL-MCNC: 1.8 MG/DL
MCH RBC QN AUTO: 39.7 PG
MCHC RBC AUTO-ENTMCNC: 35.4 G/DL
MCV RBC AUTO: 112 FL
MONOCYTES # BLD AUTO: 0.7 K/UL
MONOCYTES NFR BLD: 8.6 %
NEUTROPHILS # BLD AUTO: 3.8 K/UL
NEUTROPHILS NFR BLD: 46.1 %
NRBC BLD-RTO: 0 /100 WBC
PLATELET # BLD AUTO: 296 K/UL
PMV BLD AUTO: 10.4 FL
POTASSIUM SERPL-SCNC: 5.1 MMOL/L
PROT SERPL-MCNC: 8.2 G/DL
RBC # BLD AUTO: 3.25 M/UL
SODIUM SERPL-SCNC: 136 MMOL/L
WBC # BLD AUTO: 8.24 K/UL

## 2017-12-12 PROCEDURE — 85025 COMPLETE CBC W/AUTO DIFF WBC: CPT

## 2017-12-12 PROCEDURE — 82378 CARCINOEMBRYONIC ANTIGEN: CPT

## 2017-12-12 PROCEDURE — 82378 CARCINOEMBRYONIC ANTIGEN: CPT | Mod: PN

## 2017-12-12 PROCEDURE — 83735 ASSAY OF MAGNESIUM: CPT

## 2017-12-12 PROCEDURE — 83735 ASSAY OF MAGNESIUM: CPT | Mod: PN

## 2017-12-12 PROCEDURE — 80053 COMPREHEN METABOLIC PANEL: CPT | Mod: PN

## 2017-12-12 PROCEDURE — 86300 IMMUNOASSAY TUMOR CA 15-3: CPT | Mod: 91

## 2017-12-12 PROCEDURE — 86300 IMMUNOASSAY TUMOR CA 15-3: CPT

## 2017-12-12 PROCEDURE — 36415 COLL VENOUS BLD VENIPUNCTURE: CPT | Mod: PN

## 2017-12-12 PROCEDURE — 80053 COMPREHEN METABOLIC PANEL: CPT

## 2017-12-12 PROCEDURE — 85025 COMPLETE CBC W/AUTO DIFF WBC: CPT | Mod: PN

## 2017-12-12 PROCEDURE — 86300 IMMUNOASSAY TUMOR CA 15-3: CPT | Mod: PN

## 2017-12-13 ENCOUNTER — TELEPHONE (OUTPATIENT)
Dept: PHARMACY | Facility: CLINIC | Age: 62
End: 2017-12-13

## 2017-12-14 LAB — CANCER AG27-29 SERPL-ACNC: 36.3 U/ML

## 2017-12-14 NOTE — TELEPHONE ENCOUNTER
Confirmed restart date of ibrance of 12/5/17.  Patient has full course of therapy on hand and states all is going well so far.  No issues or changes to report.  Will reach out to her in a few weeks to coordinate refill.

## 2017-12-18 ENCOUNTER — TELEPHONE (OUTPATIENT)
Dept: INFUSION THERAPY | Facility: HOSPITAL | Age: 62
End: 2017-12-18

## 2017-12-18 NOTE — TELEPHONE ENCOUNTER
----- Message from Ta Tinsley RN sent at 12/8/2017 12:45 PM CST -----  Start xgeva, resume faslodex

## 2017-12-22 ENCOUNTER — DOCUMENTATION ONLY (OUTPATIENT)
Dept: INFUSION THERAPY | Facility: HOSPITAL | Age: 62
End: 2017-12-22

## 2017-12-26 ENCOUNTER — INFUSION (OUTPATIENT)
Dept: INFUSION THERAPY | Facility: HOSPITAL | Age: 62
End: 2017-12-26
Attending: INTERNAL MEDICINE
Payer: COMMERCIAL

## 2017-12-26 ENCOUNTER — LAB VISIT (OUTPATIENT)
Dept: LAB | Facility: HOSPITAL | Age: 62
End: 2017-12-26
Attending: PHYSICIAN ASSISTANT
Payer: COMMERCIAL

## 2017-12-26 VITALS
SYSTOLIC BLOOD PRESSURE: 144 MMHG | HEIGHT: 63 IN | RESPIRATION RATE: 18 BRPM | BODY MASS INDEX: 28.24 KG/M2 | HEART RATE: 92 BPM | TEMPERATURE: 98 F | OXYGEN SATURATION: 98 % | WEIGHT: 159.38 LBS | DIASTOLIC BLOOD PRESSURE: 82 MMHG

## 2017-12-26 DIAGNOSIS — Z17.0 MALIGNANT NEOPLASM OF LEFT BREAST IN FEMALE, ESTROGEN RECEPTOR POSITIVE, UNSPECIFIED SITE OF BREAST: ICD-10-CM

## 2017-12-26 DIAGNOSIS — Z17.0 MALIGNANT NEOPLASM OF CENTRAL PORTION OF LEFT BREAST IN FEMALE, ESTROGEN RECEPTOR POSITIVE: ICD-10-CM

## 2017-12-26 DIAGNOSIS — C50.912 MALIGNANT NEOPLASM OF LEFT BREAST IN FEMALE, ESTROGEN RECEPTOR POSITIVE, UNSPECIFIED SITE OF BREAST: ICD-10-CM

## 2017-12-26 DIAGNOSIS — C79.51 CARCINOMA OF BREAST METASTATIC TO BONE, UNSPECIFIED LATERALITY: Primary | ICD-10-CM

## 2017-12-26 DIAGNOSIS — C50.112 MALIGNANT NEOPLASM OF CENTRAL PORTION OF LEFT BREAST IN FEMALE, ESTROGEN RECEPTOR POSITIVE: ICD-10-CM

## 2017-12-26 DIAGNOSIS — C50.919 CARCINOMA OF BREAST METASTATIC TO BONE, UNSPECIFIED LATERALITY: Primary | ICD-10-CM

## 2017-12-26 DIAGNOSIS — R91.8 LUNG MASS: ICD-10-CM

## 2017-12-26 DIAGNOSIS — C77.1 CARCINOMA OF BREAST METASTATIC TO INTRATHORACIC LYMPH NODE, UNSPECIFIED LATERALITY: ICD-10-CM

## 2017-12-26 DIAGNOSIS — C50.919 CARCINOMA OF BREAST METASTATIC TO INTRATHORACIC LYMPH NODE, UNSPECIFIED LATERALITY: ICD-10-CM

## 2017-12-26 LAB
BASOPHILS # BLD AUTO: 0.08 K/UL
BASOPHILS NFR BLD: 0.6 %
DIFFERENTIAL METHOD: ABNORMAL
EOSINOPHIL # BLD AUTO: 0.9 K/UL
EOSINOPHIL NFR BLD: 7 %
ERYTHROCYTE [DISTWIDTH] IN BLOOD BY AUTOMATED COUNT: 12.7 %
HCT VFR BLD AUTO: 31 %
HGB BLD-MCNC: 10.8 G/DL
LYMPHOCYTES # BLD AUTO: 3.4 K/UL
LYMPHOCYTES NFR BLD: 25.9 %
MCH RBC QN AUTO: 38 PG
MCHC RBC AUTO-ENTMCNC: 34.8 G/DL
MCV RBC AUTO: 109 FL
MONOCYTES # BLD AUTO: 0.8 K/UL
MONOCYTES NFR BLD: 6.5 %
NEUTROPHILS # BLD AUTO: 7.8 K/UL
NEUTROPHILS NFR BLD: 60 %
NRBC BLD-RTO: 0 /100 WBC
PLATELET # BLD AUTO: 388 K/UL
PMV BLD AUTO: 9.8 FL
RBC # BLD AUTO: 2.84 M/UL
WBC # BLD AUTO: 12.99 K/UL

## 2017-12-26 PROCEDURE — 85025 COMPLETE CBC W/AUTO DIFF WBC: CPT | Mod: PN

## 2017-12-26 PROCEDURE — 85025 COMPLETE CBC W/AUTO DIFF WBC: CPT

## 2017-12-26 PROCEDURE — 63600175 PHARM REV CODE 636 W HCPCS: Mod: PN | Performed by: PHYSICIAN ASSISTANT

## 2017-12-26 PROCEDURE — 96402 CHEMO HORMON ANTINEOPL SQ/IM: CPT | Mod: PN

## 2017-12-26 PROCEDURE — 36415 COLL VENOUS BLD VENIPUNCTURE: CPT | Mod: PN

## 2017-12-26 RX ORDER — LAMOTRIGINE 25 MG/1
500 TABLET ORAL
Status: COMPLETED | OUTPATIENT
Start: 2017-12-26 | End: 2017-12-26

## 2017-12-26 RX ADMIN — FULVESTRANT 500 MG: 50 INJECTION INTRAMUSCULAR at 01:12

## 2017-12-26 NOTE — PATIENT INSTRUCTIONS
Fall Prevention  Falls often occur due to slipping, tripping or losing your balance. Millions of people fall every year and injure themselves. Here are ways to reduce your risk of falling again.  · Think about your fall, was there anything that caused your fall that can be fixed, removed, or replaced?  · Make your home safe by keeping walkways clear of objects you may trip over.  · Use non-slip pads under rugs. Do not use area rugs or small throw rugs.  · Use non-slip mats in bathtubs and showers.  · Install handrails and lights on staircases.  · Do not walk in poorly lit areas.  · Do not stand on chairs or wobbly ladders.  · Use caution when reaching overhead or looking upward. This position can cause a loss of balance.  · Be sure your shoes fit properly, have non-slip bottoms and are in good condition.   · Wear shoes both inside and out. Avoid going barefoot or wearing slippers.  · Be cautious when going up and down stairs, curbs, and when walking on uneven sidewalks.  · If your balance is poor, consider using a cane or walker.  · If your fall was related to alcohol use, stop or limit alcohol intake.   · If your fall was related to use of sleeping medicines, talk to your doctor about this. You may need to reduce your dosage at bedtime if you awaken during the night to go to the bathroom.    · To reduce the need for nighttime bathroom trips:  ¨ Avoid drinking fluids for several hours before going to bed  ¨ Empty your bladder before going to bed  ¨ Men can keep a urinal at the bedside  · Stay as active as you can. Balance, flexibility, strength, and endurance all come from exercise. They all play a role in preventing falls. Ask your healthcare provider which types of activity are right for you.  · Get your vision checked on a regular basis.  · If you have pets, know where they are before you stand up or walk so you don't trip over them.  · Use night lights.  Date Last Reviewed: 11/5/2015  © 3481-3859 [employer]. [address] [place] [address], [place] [address]. All rights reserved. This information is not intended as a substitute for professional medical care. Always follow your healthcare professional's instructions.

## 2017-12-26 NOTE — PLAN OF CARE
Problem: Patient Care Overview  Goal: Plan of Care Review  Pt. Completed treatment, tolerated treatment without noted distress.Vital signs stable. Patient discharged from infusion center . Patient had all present questions answered. Reviewed upcoming appointments.

## 2017-12-27 ENCOUNTER — TELEPHONE (OUTPATIENT)
Dept: INFUSION THERAPY | Facility: HOSPITAL | Age: 62
End: 2017-12-27

## 2017-12-27 NOTE — TELEPHONE ENCOUNTER
Spoke with patient concerning zometa infusion.  Informed infusion is scheduled for 1/9/18 with next faslodex injection. Patient stated understanding and is aware of time and location of infusion.

## 2017-12-28 ENCOUNTER — TELEPHONE (OUTPATIENT)
Dept: PHARMACY | Facility: CLINIC | Age: 62
End: 2017-12-28

## 2017-12-28 ENCOUNTER — TELEPHONE (OUTPATIENT)
Dept: HEMATOLOGY/ONCOLOGY | Facility: CLINIC | Age: 62
End: 2017-12-28

## 2017-12-28 NOTE — TELEPHONE ENCOUNTER
Called back and left message.  ------------------------------------------------------------------------------------  Marti CRAMER [facility] Cancer Navigation   Caller: Brooke Ramírez (Yesterday,  1:32 PM)             Would like to schedule an appt for cancer, to establish care with Dr. Whatley. 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     States Dr. Whatley staff is aware, and he would like a call regarding scheduling.     Please call him @ [phone]     Georgia Jeffrey    1955

## 2018-01-02 ENCOUNTER — LAB VISIT (OUTPATIENT)
Dept: LAB | Facility: HOSPITAL | Age: 63
End: 2018-01-02
Attending: PHYSICIAN ASSISTANT
Payer: COMMERCIAL

## 2018-01-02 DIAGNOSIS — C50.112 MALIGNANT NEOPLASM OF CENTRAL PORTION OF LEFT BREAST IN FEMALE, ESTROGEN RECEPTOR POSITIVE: ICD-10-CM

## 2018-01-02 DIAGNOSIS — Z17.0 MALIGNANT NEOPLASM OF CENTRAL PORTION OF LEFT BREAST IN FEMALE, ESTROGEN RECEPTOR POSITIVE: ICD-10-CM

## 2018-01-02 LAB
BASOPHILS # BLD AUTO: 0.02 K/UL
BASOPHILS NFR BLD: 0.4 %
DIFFERENTIAL METHOD: ABNORMAL
EOSINOPHIL # BLD AUTO: 0.7 K/UL
EOSINOPHIL NFR BLD: 13.3 %
ERYTHROCYTE [DISTWIDTH] IN BLOOD BY AUTOMATED COUNT: 13 %
HCT VFR BLD AUTO: 31.6 %
HGB BLD-MCNC: 10.5 G/DL
LYMPHOCYTES # BLD AUTO: 2.2 K/UL
LYMPHOCYTES NFR BLD: 44.7 %
MCH RBC QN AUTO: 36.1 PG
MCHC RBC AUTO-ENTMCNC: 33.2 G/DL
MCV RBC AUTO: 109 FL
MONOCYTES # BLD AUTO: 0.1 K/UL
MONOCYTES NFR BLD: 1.4 %
NEUTROPHILS # BLD AUTO: 2 K/UL
NEUTROPHILS NFR BLD: 40.2 %
NRBC BLD-RTO: 0 /100 WBC
PLATELET # BLD AUTO: 213 K/UL
PMV BLD AUTO: 10.2 FL
RBC # BLD AUTO: 2.91 M/UL
WBC # BLD AUTO: 4.88 K/UL

## 2018-01-02 PROCEDURE — 85025 COMPLETE CBC W/AUTO DIFF WBC: CPT | Mod: PN

## 2018-01-02 PROCEDURE — 36415 COLL VENOUS BLD VENIPUNCTURE: CPT | Mod: PN

## 2018-01-02 PROCEDURE — 85025 COMPLETE CBC W/AUTO DIFF WBC: CPT

## 2018-01-03 ENCOUNTER — TELEPHONE (OUTPATIENT)
Dept: HEMATOLOGY/ONCOLOGY | Facility: CLINIC | Age: 63
End: 2018-01-03

## 2018-01-09 ENCOUNTER — LAB VISIT (OUTPATIENT)
Dept: LAB | Facility: HOSPITAL | Age: 63
End: 2018-01-09
Attending: PHYSICIAN ASSISTANT
Payer: COMMERCIAL

## 2018-01-09 ENCOUNTER — INFUSION (OUTPATIENT)
Dept: INFUSION THERAPY | Facility: HOSPITAL | Age: 63
End: 2018-01-09
Attending: INTERNAL MEDICINE
Payer: COMMERCIAL

## 2018-01-09 ENCOUNTER — TELEPHONE (OUTPATIENT)
Dept: HEMATOLOGY/ONCOLOGY | Facility: CLINIC | Age: 63
End: 2018-01-09

## 2018-01-09 VITALS
SYSTOLIC BLOOD PRESSURE: 123 MMHG | HEART RATE: 103 BPM | TEMPERATURE: 98 F | BODY MASS INDEX: 27.5 KG/M2 | RESPIRATION RATE: 18 BRPM | DIASTOLIC BLOOD PRESSURE: 85 MMHG | WEIGHT: 155.19 LBS | HEIGHT: 63 IN

## 2018-01-09 DIAGNOSIS — Z17.0 MALIGNANT NEOPLASM OF LEFT BREAST IN FEMALE, ESTROGEN RECEPTOR POSITIVE, UNSPECIFIED SITE OF BREAST: ICD-10-CM

## 2018-01-09 DIAGNOSIS — C79.51 CARCINOMA OF BREAST METASTATIC TO BONE, UNSPECIFIED LATERALITY: Primary | ICD-10-CM

## 2018-01-09 DIAGNOSIS — Z17.0 MALIGNANT NEOPLASM OF CENTRAL PORTION OF LEFT BREAST IN FEMALE, ESTROGEN RECEPTOR POSITIVE: ICD-10-CM

## 2018-01-09 DIAGNOSIS — C50.919 CARCINOMA OF BREAST METASTATIC TO BONE, UNSPECIFIED LATERALITY: Primary | ICD-10-CM

## 2018-01-09 DIAGNOSIS — C50.112 MALIGNANT NEOPLASM OF CENTRAL PORTION OF LEFT BREAST IN FEMALE, ESTROGEN RECEPTOR POSITIVE: ICD-10-CM

## 2018-01-09 DIAGNOSIS — C77.1 CARCINOMA OF BREAST METASTATIC TO INTRATHORACIC LYMPH NODE, UNSPECIFIED LATERALITY: ICD-10-CM

## 2018-01-09 DIAGNOSIS — R91.8 LUNG MASS: ICD-10-CM

## 2018-01-09 DIAGNOSIS — C50.912 MALIGNANT NEOPLASM OF LEFT BREAST IN FEMALE, ESTROGEN RECEPTOR POSITIVE, UNSPECIFIED SITE OF BREAST: ICD-10-CM

## 2018-01-09 DIAGNOSIS — C50.919 CARCINOMA OF BREAST METASTATIC TO INTRATHORACIC LYMPH NODE, UNSPECIFIED LATERALITY: ICD-10-CM

## 2018-01-09 LAB
ALBUMIN SERPL BCP-MCNC: 4.2 G/DL
ALP SERPL-CCNC: 57 U/L
ALT SERPL W/O P-5'-P-CCNC: 24 U/L
ANION GAP SERPL CALC-SCNC: 13 MMOL/L
ANISOCYTOSIS BLD QL SMEAR: ABNORMAL
AST SERPL-CCNC: 18 U/L
BASOPHILS # BLD AUTO: 0.01 K/UL
BASOPHILS NFR BLD: 0.4 %
BILIRUB SERPL-MCNC: 0.9 MG/DL
BUN SERPL-MCNC: 10 MG/DL
CALCIUM SERPL-MCNC: 9.6 MG/DL
CANCER AG15-3 SERPL-ACNC: 28.1 U/ML
CEA SERPL-MCNC: 0.8 NG/ML
CHLORIDE SERPL-SCNC: 102 MMOL/L
CO2 SERPL-SCNC: 28 MMOL/L
CREAT SERPL-MCNC: 0.68 MG/DL
DIFFERENTIAL METHOD: ABNORMAL
EOSINOPHIL # BLD AUTO: 0.1 K/UL
EOSINOPHIL NFR BLD: 5.3 %
ERYTHROCYTE [DISTWIDTH] IN BLOOD BY AUTOMATED COUNT: 13.7 %
EST. GFR  (AFRICAN AMERICAN): >60 ML/MIN/1.73 M^2
EST. GFR  (NON AFRICAN AMERICAN): >60 ML/MIN/1.73 M^2
GLUCOSE SERPL-MCNC: 111 MG/DL
HCT VFR BLD AUTO: 32.3 %
HGB BLD-MCNC: 10.9 G/DL
HYPOCHROMIA BLD QL SMEAR: ABNORMAL
LYMPHOCYTES # BLD AUTO: 1.3 K/UL
LYMPHOCYTES NFR BLD: 53.4 %
MCH RBC QN AUTO: 36.6 PG
MCHC RBC AUTO-ENTMCNC: 33.7 G/DL
MCV RBC AUTO: 108 FL
MONOCYTES # BLD AUTO: 0.1 K/UL
MONOCYTES NFR BLD: 4.9 %
NEUTROPHILS # BLD AUTO: 0.9 K/UL
NEUTROPHILS NFR BLD: 36 %
NRBC BLD-RTO: 0 /100 WBC
PLATELET # BLD AUTO: 134 K/UL
PLATELET BLD QL SMEAR: ABNORMAL
PMV BLD AUTO: 11.4 FL
POLYCHROMASIA BLD QL SMEAR: ABNORMAL
POTASSIUM SERPL-SCNC: 4.3 MMOL/L
PROT SERPL-MCNC: 7.8 G/DL
RBC # BLD AUTO: 2.98 M/UL
SODIUM SERPL-SCNC: 143 MMOL/L
WBC # BLD AUTO: 2.47 K/UL

## 2018-01-09 PROCEDURE — 96365 THER/PROPH/DIAG IV INF INIT: CPT | Mod: PN

## 2018-01-09 PROCEDURE — 85025 COMPLETE CBC W/AUTO DIFF WBC: CPT

## 2018-01-09 PROCEDURE — 82378 CARCINOEMBRYONIC ANTIGEN: CPT | Mod: PN

## 2018-01-09 PROCEDURE — 86300 IMMUNOASSAY TUMOR CA 15-3: CPT | Mod: PN

## 2018-01-09 PROCEDURE — 86300 IMMUNOASSAY TUMOR CA 15-3: CPT

## 2018-01-09 PROCEDURE — 80053 COMPREHEN METABOLIC PANEL: CPT

## 2018-01-09 PROCEDURE — 85025 COMPLETE CBC W/AUTO DIFF WBC: CPT | Mod: PN

## 2018-01-09 PROCEDURE — 63600175 PHARM REV CODE 636 W HCPCS: Mod: PN | Performed by: INTERNAL MEDICINE

## 2018-01-09 PROCEDURE — 25000003 PHARM REV CODE 250: Mod: PN | Performed by: INTERNAL MEDICINE

## 2018-01-09 PROCEDURE — 86300 IMMUNOASSAY TUMOR CA 15-3: CPT | Mod: 91

## 2018-01-09 PROCEDURE — 96402 CHEMO HORMON ANTINEOPL SQ/IM: CPT | Mod: PN

## 2018-01-09 PROCEDURE — 82378 CARCINOEMBRYONIC ANTIGEN: CPT

## 2018-01-09 PROCEDURE — 63600175 PHARM REV CODE 636 W HCPCS: Mod: TB,PN | Performed by: PHYSICIAN ASSISTANT

## 2018-01-09 PROCEDURE — 80053 COMPREHEN METABOLIC PANEL: CPT | Mod: PN

## 2018-01-09 RX ORDER — HEPARIN 100 UNIT/ML
500 SYRINGE INTRAVENOUS
Status: CANCELLED | OUTPATIENT
Start: 2018-01-09

## 2018-01-09 RX ORDER — SODIUM CHLORIDE 0.9 % (FLUSH) 0.9 %
10 SYRINGE (ML) INJECTION
Status: CANCELLED | OUTPATIENT
Start: 2018-01-09

## 2018-01-09 RX ORDER — SODIUM CHLORIDE 0.9 % (FLUSH) 0.9 %
10 SYRINGE (ML) INJECTION
Status: DISCONTINUED | OUTPATIENT
Start: 2018-01-09 | End: 2018-01-09 | Stop reason: HOSPADM

## 2018-01-09 RX ORDER — LAMOTRIGINE 25 MG/1
500 TABLET ORAL
Status: COMPLETED | OUTPATIENT
Start: 2018-01-09 | End: 2018-01-09

## 2018-01-09 RX ADMIN — FULVESTRANT 500 MG: 50 INJECTION INTRAMUSCULAR at 11:01

## 2018-01-09 RX ADMIN — SODIUM CHLORIDE, PRESERVATIVE FREE 500 ML: 5 INJECTION INTRAVENOUS at 09:01

## 2018-01-09 RX ADMIN — ZOLEDRONIC ACID 4 MG: 0.04 INJECTION, SOLUTION INTRAVENOUS at 10:01

## 2018-01-09 NOTE — TELEPHONE ENCOUNTER
Message fwd to MD.       ----- Message from Pamela Walters sent at 1/9/2018  2:01 PM CST -----  Contact: Pt  Dr. Whatley this patient was scheduled on 1/18 to see you but you will not be here and we rescheduled those patients to see Dr. Lorenzo. This patient would like to see you only. Is there a day/time we can fit her in before the 18th?  Also patient stated she had labs today and is wondering if you could look at them as she was told her levels are low.    ----- Message -----  From: Lanie Garcias  Sent: 1/9/2018   1:49 PM  To: Pamela Walters    Pt returning your call        Pt call back number [phone]

## 2018-01-09 NOTE — PLAN OF CARE
Problem: Patient Care Overview  Goal: Plan of Care Review  Outcome: Ongoing (interventions implemented as appropriate)  Pt tolerated faslodex injection and zometa infusion well.

## 2018-01-09 NOTE — TELEPHONE ENCOUNTER
Returned call to pt.  Pt unavailable.   Left message for pt to return call.   Callback number provided.         ----- Message from Lanie Garcias sent at 1/9/2018  1:53 PM CST -----  Contact: Pt  Pt calling with questions regarding blood work results      Pt call back number [phone]

## 2018-01-09 NOTE — TELEPHONE ENCOUNTER
Called patient, no answer, left message asking pt to call back in regards to her apt on 1/18 as we are needing to change it.

## 2018-01-09 NOTE — TELEPHONE ENCOUNTER
Returned call, spoke with patient in regards to her apt needing to be changed on 1/18. Patient stated she only wants to see Dr. Whatley. I told her I would get a new apt date and time and call her back. Patient voiced understanding.

## 2018-01-09 NOTE — TELEPHONE ENCOUNTER
----- Message from Lanie Garcias sent at 1/9/2018  1:49 PM CST -----  Contact: Pt  Pt returning your call        Pt call back number [phone]

## 2018-01-11 LAB — CANCER AG27-29 SERPL-ACNC: 29 U/ML

## 2018-01-12 NOTE — PROGRESS NOTES
Subjective:       Patient ID: Georgia Jeffrey is a 62 y.o. female.    Chief Complaint: No chief complaint on file.    HPI Ms. Jeffrey is a 62-year-old female seen for metastatic carcinoma of the left breast.    Breast History: She developed palpable abnormality in her left breas in the early part of 2017.    Diagnostic mammography from April 18, 2017 showed an irregular mass with pleomorphic calcifications at the 9:00 area of the left breast.  By ultrasound this was solid mass measuring 36 mm.    Breast biopsy on April 19, 2017 showed infiltrating ductal carcinoma (histologic grade 3, nuclear grade 2, mitotic index 3) tumor was 99% ER positive, 91% DE positive.  HER-2 was negative by FISH.    On May 11, 2017 she underwent bilateral nipple sparing mastectomies by Dr.Carl Gutierrez and autologous breast reconstruction by  at [facility].  She developed some compromise of her left flap which was removed and an implant was placed.  That became infected and was removed.  Several weeks ago she underwent removal of residual reconstruction from both sides.    The pathology from that surgery showed a 3 cm high grade grading ductal carcinoma (3+3+2).  There is associated lymphovascular invasion.  In addition was associated high-grade DCIS.  [place] lymph node was negative.    The right breast showed no evidence of any abnormality.  Final pathological stage TII N0 stage IIA.  Oncotype was 31.    She was seen by medical oncology and chemotherapy was discussed.     PET/CT scan was performed on June 27 which showed increased uptake in the subcarinal node measuring 9 mm an SUV of 5.9, there was increased uptake in the right hilum with an SUV of 5.9, there were multiple pulmonary nodules on the right side a right middle lobe nodule measured 16 mm with an SUV of 6.8 right lower lobe nodule 9 mm with an SUV of 2.6.  In addition there were multiple other subcentimeter nodules.  Increased uptake was seen in  the right intertrochanteric femur with an SUV of 4.  (Subsequent MRI of the right femur on July 14 did not show any bony abnormality).    On July 14 she underwent bronchoscopy and endoscopic ultrasound.  Left lower lobe brush sample was positive for carcinoma and a fine-needle aspirate from a station 7 lymph node was also positive for carcinoma.  These were estrogen receptor positive.    In July 2017 she was started on systemic therapy with Faslodex and palbociclib.  In August 2017 she developed a DVT of the left lower extremity with the peroneal and posterior tibial veins exhibiting thrombosis.  She was started on apixaban at that time.    Due to insurance  issues she was changed to letrozole in October 2017 and continued her palbociclib. She had significant issues regarding her tolerance of letrozole, with symptoms of nausea and vomiting.    Follow-up PET scan in September  suggested bone metastasis.  On December 1, 2017 she underwent MRI scanning of the thoracic and lumbar spine.  That showed a metastasis at the left T2 lamina.  The lumbar spine was negative for malignancy Subsequent MRI scans on December 4 were negative in the cervical spine and brain were all negative for malignancy.  Chest x-ray in December 4 was clear.    She was off all therapy for about 6 weeks then restarted 12/17/17 on faslodex and palbociclib.    On January 9, 2018 she began Zometa therapy.              Menstrual History:    Menarche - 13   G - 9  P - 2  First birth age - 27,BCP - no    Menopause - 47      HRT - no    Family History -                                 Breast - no                                Ovarian -  no                                    Other - no    Social History :    Smoking - never              ETOH - rare  Works at [employer]    PMH:Asthma      Review of Systems   Constitutional: Positive for activity change, appetite change (poor), fatigue (severe) and unexpected weight change (was 188 when she  was first diagnosed). Negative for fever.   Eyes: Negative for visual disturbance.   Respiratory: Negative for cough and shortness of breath.    Cardiovascular: Negative for chest pain.   Gastrointestinal: Positive for constipation and diarrhea. Negative for abdominal pain.        Bowels variable   Genitourinary: Negative for frequency.   Musculoskeletal: Negative for back pain and neck pain.   Skin: Negative for rash.   Neurological: Negative for headaches.   Hematological: Negative for adenopathy.   Psychiatric/Behavioral: Negative for dysphoric mood. The patient is not nervous/anxious.        Objective:      Physical Exam   Constitutional: She is oriented to person, place, and time. She appears well-developed and well-nourished. No distress.   HENT:   Head: Normocephalic and atraumatic.   Mouth/Throat: Oropharynx is clear and moist. No oropharyngeal exudate.   Eyes: Pupils are equal, round, and reactive to light. No scleral icterus.   Neck: Neck supple.   Cardiovascular: Regular rhythm.    Pulmonary/Chest: Effort normal and breath sounds normal. She has no wheezes. She has no rales.       Abdominal: Soft. She exhibits no mass. There is no tenderness.   Lymphadenopathy:     She has no cervical adenopathy.   Neurological: She is alert and oriented to person, place, and time.   Psychiatric: She has a normal mood and affect. Her behavior is normal. Thought content normal.   Vitals reviewed.      Assessment:       1. Malignant neoplasm of central portion of left breast in female, estrogen receptor positive    2. 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        Distress Screening Results: Psychosocial Distress screening score of Distress Score: 5 noted and reviewed. No intervention  indicated.

## 2018-01-15 ENCOUNTER — INITIAL CONSULT (OUTPATIENT)
Dept: HEMATOLOGY/ONCOLOGY | Facility: CLINIC | Age: 63
End: 2018-01-15
Payer: COMMERCIAL

## 2018-01-15 ENCOUNTER — LAB VISIT (OUTPATIENT)
Dept: LAB | Facility: HOSPITAL | Age: 63
End: 2018-01-15
Attending: INTERNAL MEDICINE
Payer: COMMERCIAL

## 2018-01-15 VITALS
WEIGHT: 155 LBS | SYSTOLIC BLOOD PRESSURE: 132 MMHG | RESPIRATION RATE: 16 BRPM | DIASTOLIC BLOOD PRESSURE: 78 MMHG | HEIGHT: 63 IN | HEART RATE: 86 BPM | BODY MASS INDEX: 27.46 KG/M2 | TEMPERATURE: 98 F

## 2018-01-15 DIAGNOSIS — C50.919 CARCINOMA OF BREAST METASTATIC TO INTRATHORACIC LYMPH NODE, UNSPECIFIED LATERALITY: ICD-10-CM

## 2018-01-15 DIAGNOSIS — Z17.0 MALIGNANT NEOPLASM OF CENTRAL PORTION OF LEFT BREAST IN FEMALE, ESTROGEN RECEPTOR POSITIVE: ICD-10-CM

## 2018-01-15 DIAGNOSIS — C77.1 CARCINOMA OF BREAST METASTATIC TO INTRATHORACIC LYMPH NODE, UNSPECIFIED LATERALITY: ICD-10-CM

## 2018-01-15 DIAGNOSIS — C79.51 CARCINOMA OF BREAST METASTATIC TO BONE, UNSPECIFIED LATERALITY: ICD-10-CM

## 2018-01-15 DIAGNOSIS — C50.112 MALIGNANT NEOPLASM OF CENTRAL PORTION OF LEFT BREAST IN FEMALE, ESTROGEN RECEPTOR POSITIVE: ICD-10-CM

## 2018-01-15 DIAGNOSIS — C50.919 CARCINOMA OF BREAST METASTATIC TO BONE, UNSPECIFIED LATERALITY: ICD-10-CM

## 2018-01-15 DIAGNOSIS — Z17.0 MALIGNANT NEOPLASM OF CENTRAL PORTION OF LEFT BREAST IN FEMALE, ESTROGEN RECEPTOR POSITIVE: Primary | ICD-10-CM

## 2018-01-15 DIAGNOSIS — C50.112 MALIGNANT NEOPLASM OF CENTRAL PORTION OF LEFT BREAST IN FEMALE, ESTROGEN RECEPTOR POSITIVE: Primary | ICD-10-CM

## 2018-01-15 LAB
ALBUMIN SERPL BCP-MCNC: 4.2 G/DL
ALP SERPL-CCNC: 81 U/L
ALT SERPL W/O P-5'-P-CCNC: 28 U/L
ANION GAP SERPL CALC-SCNC: 10 MMOL/L
ANISOCYTOSIS BLD QL SMEAR: SLIGHT
AST SERPL-CCNC: 19 U/L
BASOPHILS # BLD AUTO: 0.02 K/UL
BASOPHILS NFR BLD: 0.5 %
BILIRUB SERPL-MCNC: 0.5 MG/DL
BUN SERPL-MCNC: 10 MG/DL
CALCIUM SERPL-MCNC: 8.5 MG/DL
CHLORIDE SERPL-SCNC: 104 MMOL/L
CO2 SERPL-SCNC: 26 MMOL/L
CREAT SERPL-MCNC: 0.6 MG/DL
DIFFERENTIAL METHOD: ABNORMAL
EOSINOPHIL # BLD AUTO: 0.1 K/UL
EOSINOPHIL NFR BLD: 2.4 %
ERYTHROCYTE [DISTWIDTH] IN BLOOD BY AUTOMATED COUNT: 15 %
EST. GFR  (AFRICAN AMERICAN): >60 ML/MIN/1.73 M^2
EST. GFR  (NON AFRICAN AMERICAN): >60 ML/MIN/1.73 M^2
GLUCOSE SERPL-MCNC: 114 MG/DL
HCT VFR BLD AUTO: 31.4 %
HGB BLD-MCNC: 10.7 G/DL
HYPOCHROMIA BLD QL SMEAR: ABNORMAL
LYMPHOCYTES # BLD AUTO: 2 K/UL
LYMPHOCYTES NFR BLD: 52.9 %
MCH RBC QN AUTO: 37 PG
MCHC RBC AUTO-ENTMCNC: 34.1 G/DL
MCV RBC AUTO: 109 FL
MONOCYTES # BLD AUTO: 0.2 K/UL
MONOCYTES NFR BLD: 3.9 %
NEUTROPHILS # BLD AUTO: 1.5 K/UL
NEUTROPHILS NFR BLD: 40.3 %
NRBC BLD-RTO: 0 /100 WBC
PLATELET # BLD AUTO: 190 K/UL
PLATELET BLD QL SMEAR: ABNORMAL
PMV BLD AUTO: 10.9 FL
POLYCHROMASIA BLD QL SMEAR: ABNORMAL
POTASSIUM SERPL-SCNC: 4.7 MMOL/L
PROT SERPL-MCNC: 7.9 G/DL
RBC # BLD AUTO: 2.89 M/UL
SODIUM SERPL-SCNC: 140 MMOL/L
WBC # BLD AUTO: 3.82 K/UL

## 2018-01-15 PROCEDURE — 80053 COMPREHEN METABOLIC PANEL: CPT | Mod: PN

## 2018-01-15 PROCEDURE — 99205 OFFICE O/P NEW HI 60 MIN: CPT | Mod: S$GLB,,, | Performed by: INTERNAL MEDICINE

## 2018-01-15 PROCEDURE — 80053 COMPREHEN METABOLIC PANEL: CPT

## 2018-01-15 PROCEDURE — 36415 COLL VENOUS BLD VENIPUNCTURE: CPT | Mod: PN

## 2018-01-15 PROCEDURE — 85025 COMPLETE CBC W/AUTO DIFF WBC: CPT

## 2018-01-15 PROCEDURE — 85025 COMPLETE CBC W/AUTO DIFF WBC: CPT | Mod: PN

## 2018-01-15 PROCEDURE — 99999 PR PBB SHADOW E&M-EST. PATIENT-LVL III: CPT | Mod: PBBFAC,,, | Performed by: INTERNAL MEDICINE

## 2018-01-15 PROCEDURE — 86300 IMMUNOASSAY TUMOR CA 15-3: CPT

## 2018-01-15 RX ORDER — CYCLOBENZAPRINE HCL 10 MG
TABLET ORAL
Refills: 0 | COMMUNITY
Start: 2017-12-19 | End: 2018-02-20 | Stop reason: SDUPTHER

## 2018-01-15 RX ORDER — TRAMADOL HYDROCHLORIDE 50 MG/1
TABLET ORAL
Refills: 0 | COMMUNITY
Start: 2017-12-19

## 2018-01-19 LAB — CANCER AG27-29 SERPL-ACNC: 28.4 U/ML

## 2018-01-23 ENCOUNTER — INFUSION (OUTPATIENT)
Dept: INFUSION THERAPY | Facility: HOSPITAL | Age: 63
End: 2018-01-23
Attending: INTERNAL MEDICINE
Payer: COMMERCIAL

## 2018-01-23 ENCOUNTER — TELEPHONE (OUTPATIENT)
Dept: HEMATOLOGY/ONCOLOGY | Facility: CLINIC | Age: 63
End: 2018-01-23

## 2018-01-23 ENCOUNTER — LAB VISIT (OUTPATIENT)
Dept: LAB | Facility: HOSPITAL | Age: 63
End: 2018-01-23
Attending: PHYSICIAN ASSISTANT
Payer: COMMERCIAL

## 2018-01-23 DIAGNOSIS — C50.112 MALIGNANT NEOPLASM OF CENTRAL PORTION OF LEFT BREAST IN FEMALE, ESTROGEN RECEPTOR POSITIVE: ICD-10-CM

## 2018-01-23 DIAGNOSIS — C50.919 CARCINOMA OF BREAST METASTATIC TO INTRATHORACIC LYMPH NODE, UNSPECIFIED LATERALITY: ICD-10-CM

## 2018-01-23 DIAGNOSIS — Z17.0 MALIGNANT NEOPLASM OF CENTRAL PORTION OF LEFT BREAST IN FEMALE, ESTROGEN RECEPTOR POSITIVE: ICD-10-CM

## 2018-01-23 DIAGNOSIS — Z17.0 MALIGNANT NEOPLASM OF LEFT BREAST IN FEMALE, ESTROGEN RECEPTOR POSITIVE, UNSPECIFIED SITE OF BREAST: ICD-10-CM

## 2018-01-23 DIAGNOSIS — C79.51 CARCINOMA OF BREAST METASTATIC TO BONE, UNSPECIFIED LATERALITY: Primary | ICD-10-CM

## 2018-01-23 DIAGNOSIS — R91.8 LUNG MASS: ICD-10-CM

## 2018-01-23 DIAGNOSIS — C77.1 CARCINOMA OF BREAST METASTATIC TO INTRATHORACIC LYMPH NODE, UNSPECIFIED LATERALITY: ICD-10-CM

## 2018-01-23 DIAGNOSIS — C50.912 MALIGNANT NEOPLASM OF LEFT BREAST IN FEMALE, ESTROGEN RECEPTOR POSITIVE, UNSPECIFIED SITE OF BREAST: ICD-10-CM

## 2018-01-23 DIAGNOSIS — C50.919 CARCINOMA OF BREAST METASTATIC TO BONE, UNSPECIFIED LATERALITY: Primary | ICD-10-CM

## 2018-01-23 LAB
ANISOCYTOSIS BLD QL SMEAR: SLIGHT
BASOPHILS # BLD AUTO: 0.01 K/UL
BASOPHILS NFR BLD: 0.4 %
DIFFERENTIAL METHOD: ABNORMAL
EOSINOPHIL # BLD AUTO: 0.1 K/UL
EOSINOPHIL NFR BLD: 1.8 %
ERYTHROCYTE [DISTWIDTH] IN BLOOD BY AUTOMATED COUNT: 16.4 %
GIANT PLATELETS BLD QL SMEAR: PRESENT
HCT VFR BLD AUTO: 30.2 %
HGB BLD-MCNC: 10.4 G/DL
HYPOCHROMIA BLD QL SMEAR: ABNORMAL
LYMPHOCYTES # BLD AUTO: 1.5 K/UL
LYMPHOCYTES NFR BLD: 56 %
MCH RBC QN AUTO: 37.4 PG
MCHC RBC AUTO-ENTMCNC: 34.4 G/DL
MCV RBC AUTO: 109 FL
MONOCYTES # BLD AUTO: 0.3 K/UL
MONOCYTES NFR BLD: 9.1 %
NEUTROPHILS # BLD AUTO: 0.9 K/UL
NEUTROPHILS NFR BLD: 32.7 %
NRBC BLD-RTO: 1 /100 WBC
PLATELET # BLD AUTO: 186 K/UL
PLATELET BLD QL SMEAR: ABNORMAL
PMV BLD AUTO: 11.2 FL
POLYCHROMASIA BLD QL SMEAR: ABNORMAL
RBC # BLD AUTO: 2.78 M/UL
WBC # BLD AUTO: 2.75 K/UL

## 2018-01-23 PROCEDURE — 63600175 PHARM REV CODE 636 W HCPCS: Mod: TB,PN | Performed by: PHYSICIAN ASSISTANT

## 2018-01-23 PROCEDURE — 85025 COMPLETE CBC W/AUTO DIFF WBC: CPT

## 2018-01-23 PROCEDURE — 85025 COMPLETE CBC W/AUTO DIFF WBC: CPT | Mod: PN

## 2018-01-23 PROCEDURE — 96402 CHEMO HORMON ANTINEOPL SQ/IM: CPT | Mod: PN

## 2018-01-23 PROCEDURE — 36415 COLL VENOUS BLD VENIPUNCTURE: CPT | Mod: PN

## 2018-01-23 RX ORDER — LAMOTRIGINE 25 MG/1
500 TABLET ORAL
Status: COMPLETED | OUTPATIENT
Start: 2018-01-23 | End: 2018-01-23

## 2018-01-23 RX ADMIN — FULVESTRANT 500 MG: 50 INJECTION INTRAMUSCULAR at 09:01

## 2018-01-23 NOTE — TELEPHONE ENCOUNTER
Returned call to pt.   Pt stated that she had a CBC done today and wants to know if repeat CBC needed on Thursday- informed pt would cancel labs on Thursday as previously completed.   Pt stated that she wanted to let Dr. Whatley know that her back pain has gotten worse- particularly in her upper spine- states that when turns she is in excruciating pain and has spasms.   Pt wanted to know if PET on Thursday would identify if cancer in her spine had gotten worse- to which nurse informed scan would identify progression/worsening of disease if so.   Pt stated she will bring copy of her previous PET to appt on Thursday.   Informed pt would send a message to Dr. Whatley as an FYI regarding back pain prior to Thursday's appt.   Pt verbalized understanding.     Message routed to [place].     ----- Message from Lanie Garcias sent at 1/23/2018  2:04 PM CST -----  Contact: Pt  Pt calling with questions regarding upcoming appts and her recent back pain  '      Pt call back number [phone]

## 2018-01-23 NOTE — TELEPHONE ENCOUNTER
Some upper back pain likely related to her T2 metastasis.  She has some associated muscle spasm in that area but no numbness and no other symptoms to suggest neurological impairment.    She has a PET scan in 2 days and appointment to see me.  I told her to call me if anything changes before then.

## 2018-01-25 ENCOUNTER — OFFICE VISIT (OUTPATIENT)
Dept: HEMATOLOGY/ONCOLOGY | Facility: CLINIC | Age: 63
End: 2018-01-25
Payer: COMMERCIAL

## 2018-01-25 ENCOUNTER — HOSPITAL ENCOUNTER (OUTPATIENT)
Dept: RADIOLOGY | Facility: HOSPITAL | Age: 63
Discharge: HOME | End: 2018-01-25
Attending: INTERNAL MEDICINE
Payer: COMMERCIAL

## 2018-01-25 VITALS
SYSTOLIC BLOOD PRESSURE: 125 MMHG | TEMPERATURE: 98 F | WEIGHT: 156.5 LBS | HEART RATE: 106 BPM | BODY MASS INDEX: 27.73 KG/M2 | RESPIRATION RATE: 17 BRPM | DIASTOLIC BLOOD PRESSURE: 84 MMHG

## 2018-01-25 DIAGNOSIS — C50.112 MALIGNANT NEOPLASM OF CENTRAL PORTION OF LEFT BREAST IN FEMALE, ESTROGEN RECEPTOR POSITIVE: ICD-10-CM

## 2018-01-25 DIAGNOSIS — M54.6 CHRONIC MIDLINE THORACIC BACK PAIN: ICD-10-CM

## 2018-01-25 DIAGNOSIS — C50.919 CARCINOMA OF BREAST METASTATIC TO INTRATHORACIC LYMPH NODE, UNSPECIFIED LATERALITY: ICD-10-CM

## 2018-01-25 DIAGNOSIS — C79.51 CARCINOMA OF BREAST METASTATIC TO BONE, UNSPECIFIED LATERALITY: ICD-10-CM

## 2018-01-25 DIAGNOSIS — C50.112 MALIGNANT NEOPLASM OF CENTRAL PORTION OF LEFT BREAST IN FEMALE, ESTROGEN RECEPTOR POSITIVE: Primary | ICD-10-CM

## 2018-01-25 DIAGNOSIS — G89.29 CHRONIC MIDLINE THORACIC BACK PAIN: ICD-10-CM

## 2018-01-25 DIAGNOSIS — C50.919 CARCINOMA OF BREAST METASTATIC TO BONE, UNSPECIFIED LATERALITY: ICD-10-CM

## 2018-01-25 DIAGNOSIS — Z17.0 MALIGNANT NEOPLASM OF CENTRAL PORTION OF LEFT BREAST IN FEMALE, ESTROGEN RECEPTOR POSITIVE: Primary | ICD-10-CM

## 2018-01-25 DIAGNOSIS — C77.1 CARCINOMA OF BREAST METASTATIC TO INTRATHORACIC LYMPH NODE, UNSPECIFIED LATERALITY: ICD-10-CM

## 2018-01-25 DIAGNOSIS — Z17.0 MALIGNANT NEOPLASM OF CENTRAL PORTION OF LEFT BREAST IN FEMALE, ESTROGEN RECEPTOR POSITIVE: ICD-10-CM

## 2018-01-25 LAB — POCT GLUCOSE: 72 MG/DL (ref 70–110)

## 2018-01-25 PROCEDURE — 78815 PET IMAGE W/CT SKULL-THIGH: CPT | Mod: TC

## 2018-01-25 PROCEDURE — A9552 F18 FDG: HCPCS

## 2018-01-25 PROCEDURE — 99214 OFFICE O/P EST MOD 30 MIN: CPT | Mod: S$GLB,,, | Performed by: INTERNAL MEDICINE

## 2018-01-25 PROCEDURE — 99999 PR PBB SHADOW E&M-EST. PATIENT-LVL III: CPT | Mod: PBBFAC,,, | Performed by: INTERNAL MEDICINE

## 2018-01-25 PROCEDURE — 78815 PET IMAGE W/CT SKULL-THIGH: CPT | Mod: 26,PI,, | Performed by: RADIOLOGY

## 2018-01-25 RX ORDER — PREDNISONE 10 MG/1
40 TABLET ORAL DAILY
Qty: 20 TABLET | Refills: 0 | Status: SHIPPED | OUTPATIENT
Start: 2018-01-25 | End: 2018-02-04

## 2018-01-25 NOTE — Clinical Note
CBC on that or sure on February 7, labs when she sees me on February 20, continue her Faslodex on the [facility] as scheduled.  (Next dose due February 20)

## 2018-01-26 ENCOUNTER — TELEPHONE (OUTPATIENT)
Dept: PHARMACY | Facility: CLINIC | Age: 63
End: 2018-01-26

## 2018-01-30 ENCOUNTER — LAB VISIT (OUTPATIENT)
Dept: LAB | Facility: HOSPITAL | Age: 63
End: 2018-01-30
Attending: PHYSICIAN ASSISTANT
Payer: COMMERCIAL

## 2018-01-30 DIAGNOSIS — C50.112 MALIGNANT NEOPLASM OF CENTRAL PORTION OF LEFT BREAST IN FEMALE, ESTROGEN RECEPTOR POSITIVE: ICD-10-CM

## 2018-01-30 DIAGNOSIS — Z17.0 MALIGNANT NEOPLASM OF CENTRAL PORTION OF LEFT BREAST IN FEMALE, ESTROGEN RECEPTOR POSITIVE: ICD-10-CM

## 2018-01-30 LAB
ANISOCYTOSIS BLD QL SMEAR: SLIGHT
BASOPHILS NFR BLD: 0 %
DIFFERENTIAL METHOD: ABNORMAL
EOSINOPHIL NFR BLD: 0 %
ERYTHROCYTE [DISTWIDTH] IN BLOOD BY AUTOMATED COUNT: 17.8 %
HCT VFR BLD AUTO: 30.9 %
HGB BLD-MCNC: 10.6 G/DL
HYPOCHROMIA BLD QL SMEAR: ABNORMAL
LYMPHOCYTES NFR BLD: 43 %
MCH RBC QN AUTO: 37.3 PG
MCHC RBC AUTO-ENTMCNC: 34.3 G/DL
MCV RBC AUTO: 109 FL
MONOCYTES NFR BLD: 4 %
NEUTROPHILS # BLD AUTO: 3.2 K/UL
NEUTROPHILS NFR BLD: 53 %
NRBC BLD-RTO: 0 /100 WBC
PLATELET # BLD AUTO: 336 K/UL
PLATELET BLD QL SMEAR: ABNORMAL
PMV BLD AUTO: 10 FL
POLYCHROMASIA BLD QL SMEAR: ABNORMAL
RBC # BLD AUTO: 2.84 M/UL
WBC # BLD AUTO: 6.09 K/UL

## 2018-01-30 PROCEDURE — 85027 COMPLETE CBC AUTOMATED: CPT

## 2018-01-30 PROCEDURE — 85007 BL SMEAR W/DIFF WBC COUNT: CPT | Mod: PN

## 2018-01-30 PROCEDURE — 85007 BL SMEAR W/DIFF WBC COUNT: CPT

## 2018-01-30 PROCEDURE — 36415 COLL VENOUS BLD VENIPUNCTURE: CPT | Mod: PN

## 2018-01-30 PROCEDURE — 85027 COMPLETE CBC AUTOMATED: CPT | Mod: PN

## 2018-02-07 ENCOUNTER — PATIENT MESSAGE (OUTPATIENT)
Dept: HEMATOLOGY/ONCOLOGY | Facility: CLINIC | Age: 63
End: 2018-02-07

## 2018-02-19 ENCOUNTER — PATIENT MESSAGE (OUTPATIENT)
Dept: HEMATOLOGY/ONCOLOGY | Facility: CLINIC | Age: 63
End: 2018-02-19

## 2018-02-19 ENCOUNTER — LAB VISIT (OUTPATIENT)
Dept: LAB | Facility: HOSPITAL | Age: 63
End: 2018-02-19
Attending: INTERNAL MEDICINE
Payer: COMMERCIAL

## 2018-02-19 DIAGNOSIS — C50.919 CARCINOMA OF BREAST METASTATIC TO BONE, UNSPECIFIED LATERALITY: ICD-10-CM

## 2018-02-19 DIAGNOSIS — Z17.0 MALIGNANT NEOPLASM OF CENTRAL PORTION OF LEFT BREAST IN FEMALE, ESTROGEN RECEPTOR POSITIVE: ICD-10-CM

## 2018-02-19 DIAGNOSIS — C50.919 CARCINOMA OF BREAST METASTATIC TO INTRATHORACIC LYMPH NODE, UNSPECIFIED LATERALITY: ICD-10-CM

## 2018-02-19 DIAGNOSIS — C77.1 CARCINOMA OF BREAST METASTATIC TO INTRATHORACIC LYMPH NODE, UNSPECIFIED LATERALITY: ICD-10-CM

## 2018-02-19 DIAGNOSIS — C50.112 MALIGNANT NEOPLASM OF CENTRAL PORTION OF LEFT BREAST IN FEMALE, ESTROGEN RECEPTOR POSITIVE: ICD-10-CM

## 2018-02-19 DIAGNOSIS — C79.51 CARCINOMA OF BREAST METASTATIC TO BONE, UNSPECIFIED LATERALITY: ICD-10-CM

## 2018-02-19 LAB
ALBUMIN SERPL BCP-MCNC: 4.2 G/DL
ALP SERPL-CCNC: 56 U/L
ALT SERPL W/O P-5'-P-CCNC: 32 U/L
ANION GAP SERPL CALC-SCNC: 11 MMOL/L
ANISOCYTOSIS BLD QL SMEAR: SLIGHT
AST SERPL-CCNC: 24 U/L
BASOPHILS # BLD AUTO: 0.02 K/UL
BASOPHILS NFR BLD: 0.6 %
BILIRUB SERPL-MCNC: 0.4 MG/DL
BUN SERPL-MCNC: 13 MG/DL
CALCIUM SERPL-MCNC: 9.8 MG/DL
CHLORIDE SERPL-SCNC: 101 MMOL/L
CO2 SERPL-SCNC: 27 MMOL/L
CREAT SERPL-MCNC: 0.59 MG/DL
DIFFERENTIAL METHOD: ABNORMAL
EOSINOPHIL # BLD AUTO: 0.1 K/UL
EOSINOPHIL NFR BLD: 1.9 %
ERYTHROCYTE [DISTWIDTH] IN BLOOD BY AUTOMATED COUNT: 19.8 %
EST. GFR  (AFRICAN AMERICAN): >60 ML/MIN/1.73 M^2
EST. GFR  (NON AFRICAN AMERICAN): >60 ML/MIN/1.73 M^2
GLUCOSE SERPL-MCNC: 102 MG/DL
HCT VFR BLD AUTO: 29.3 %
HGB BLD-MCNC: 10.2 G/DL
LYMPHOCYTES # BLD AUTO: 1.6 K/UL
LYMPHOCYTES NFR BLD: 51.3 %
MCH RBC QN AUTO: 37.9 PG
MCHC RBC AUTO-ENTMCNC: 34.8 G/DL
MCV RBC AUTO: 109 FL
MONOCYTES # BLD AUTO: 0.3 K/UL
MONOCYTES NFR BLD: 7.9 %
NEUTROPHILS # BLD AUTO: 1.2 K/UL
NEUTROPHILS NFR BLD: 38.3 %
NRBC BLD-RTO: 0 /100 WBC
PLATELET # BLD AUTO: 290 K/UL
PLATELET BLD QL SMEAR: ABNORMAL
PMV BLD AUTO: 10.4 FL
POTASSIUM SERPL-SCNC: 5 MMOL/L
PROT SERPL-MCNC: 7.6 G/DL
RBC # BLD AUTO: 2.69 M/UL
SODIUM SERPL-SCNC: 139 MMOL/L
WBC # BLD AUTO: 3.18 K/UL

## 2018-02-19 PROCEDURE — 85025 COMPLETE CBC W/AUTO DIFF WBC: CPT

## 2018-02-19 PROCEDURE — 80053 COMPREHEN METABOLIC PANEL: CPT | Mod: PN

## 2018-02-19 PROCEDURE — 36415 COLL VENOUS BLD VENIPUNCTURE: CPT | Mod: PN

## 2018-02-19 PROCEDURE — 86300 IMMUNOASSAY TUMOR CA 15-3: CPT

## 2018-02-19 PROCEDURE — 85025 COMPLETE CBC W/AUTO DIFF WBC: CPT | Mod: PN

## 2018-02-19 PROCEDURE — 80053 COMPREHEN METABOLIC PANEL: CPT

## 2018-02-19 NOTE — PROGRESS NOTES
Subjective:       Patient ID: Georgia Jeffrey is a 62 y.o. female.    Chief Complaint: No chief complaint on file.    HPI Ms. Jeffrey is a 62-year-old female seen for metastatic carcinoma of the left breast.      She is currently on Faslodex and palbociclib.  In addition she is on Zometa for all metastasis.  At the time of her last visit in January, her PET scan showed some increased uptake in the T2 lamina, however other areas had improved, specifically intrathoracic claudine metastasis and lung metastasis.    Today she reports that her upper back pain is better although she still has been very careful about how she moves as she can set the pain off with the wrong activity.  Chest some ongoing problems nausea and occasionally significant vomiting which she relates the Ibrance.  Her antiemetics make her fatigued.  She has some occasional severe fatigue at times having to stay in the bed, other times her energy is good.  She has no shortness of breath.  Appetite is been variable, bowels are irregular sometimes constipation sometimes diarrhea.      Breast History: She developed palpable abnormality in her left breas in the early part of 2017.    Diagnostic mammography from April 18, 2017 showed an irregular mass with pleomorphic calcifications at the 9:00 area of the left breast.  By ultrasound this was solid mass measuring 36 mm.    Breast biopsy on April 19, 2017 showed infiltrating ductal carcinoma (histologic grade 3, nuclear grade 2, mitotic index 3) tumor was 99% ER positive, 91% CT positive.  HER-2 was negative by FISH.    On May 11, 2017 she underwent bilateral nipple sparing mastectomies by Dr.Carl Gutierrez and autologous breast reconstruction by  at [facility].  She developed some compromise of her left flap which was removed and an implant was placed.  That became infected and was removed.  Several weeks ago she underwent removal of residual reconstruction from both sides.    The  pathology from that surgery showed a 3 cm high grade grading ductal carcinoma (3+3+2).  There is associated lymphovascular invasion.  In addition was associated high-grade DCIS.  [place] lymph node was negative.    The right breast showed no evidence of any abnormality.  Final pathological stage TII N0 stage IIA.  Oncotype was 31.    She was seen by medical oncology and chemotherapy was discussed.     PET/CT scan was performed on June 27 which showed increased uptake in the subcarinal node measuring 9 mm an SUV of 5.9, there was increased uptake in the right hilum with an SUV of 5.9, there were multiple pulmonary nodules on the right side a right middle lobe nodule measured 16 mm with an SUV of 6.8 right lower lobe nodule 9 mm with an SUV of 2.6.  In addition there were multiple other subcentimeter nodules.  Increased uptake was seen in the right intertrochanteric femur with an SUV of 4.  (Subsequent MRI of the right femur on July 14 did not show any bony abnormality).    On July 14 she underwent bronchoscopy and endoscopic ultrasound.  Left lower lobe brush sample was positive for carcinoma and a fine-needle aspirate from a station 7 lymph node was also positive for carcinoma.  These were estrogen receptor positive.    In July 2017 she was started on systemic therapy with Faslodex and palbociclib.  In August 2017 she developed a DVT of the left lower extremity with the peroneal and posterior tibial veins exhibiting thrombosis.  She was started on apixaban at that time.  Follow-up PET scan in September  suggested bone metastasis.    Due to insurance  issues she was changed to letrozole in October 2017 and continued her palbociclib. She had significant nausea and vomiting secondary to letrozole and that was discontinued.      On December 1, 2017 she underwent MRI scanning of the thoracic and lumbar spine.  That showed a metastasis at the left T2 lamina.  The lumbar spine was negative for malignancy Subsequent MRI  scans on December 4 were negative in the cervical spine and brain were all negative for malignancy.  Chest x-ray in December 4 was clear.    She was off all therapy for about 6 weeks then restarted  faslodex and palbociclib on  12/17/17.    On January 9, 2018 she began Zometa therapy.      PET from January - 2017:showed a T2 left laminar lesion with SUV increased from prior PET/CT value.  Increase value L5 is felt to be related to degenerative change,  The right hilar and mediastinal lymphadenopathy demonstrates only background activity.  1.1 cm right middle lobe nodule has minimal hypermetabolic activity.  There were subcentimeter nodules in the medial basal segments of right and left lower lobes below threshold for detection by PET.      Review of Systems   Constitutional: Positive for activity change and fatigue. Negative for diaphoresis and unexpected weight change.   HENT: Negative for trouble swallowing.    Respiratory: Negative for cough and shortness of breath.    Cardiovascular: Negative for chest pain.   Gastrointestinal: Positive for constipation, diarrhea, nausea and vomiting. Negative for abdominal pain.   Musculoskeletal: Positive for back pain. Negative for neck pain.   Skin: Negative for rash.   Psychiatric/Behavioral: Negative for dysphoric mood. The patient is not nervous/anxious.        Objective:      Physical Exam   Constitutional: She is oriented to person, place, and time. She appears well-developed and well-nourished. No distress.   HENT:   Mouth/Throat: No oropharyngeal exudate.   Eyes: No scleral icterus.   Cardiovascular: Normal rate and regular rhythm.    Pulmonary/Chest: Effort normal and breath sounds normal. She has no wheezes. She has no rales.       Abdominal: Soft. She exhibits no mass. There is no tenderness.   Lymphadenopathy:     She has no cervical adenopathy.   Neurological: She is alert and oriented to person, place, and time.   Psychiatric: She has a normal mood and affect.  Her behavior is normal. Thought content normal.   Vitals reviewed.      Assessment:     Labs from 2/19 - white blood cell count 3180 with an ANC of 1200.  Hemoglobin 10.2 bili count 290,000.  Her blood profile is unremarkable.  1. Malignant neoplasm of central portion of left breast in female, estrogen receptor positive        Plan:     continue monthly Faslodex and palbociclib.    Zometa every 3 months.

## 2018-02-20 ENCOUNTER — TELEPHONE (OUTPATIENT)
Dept: INFUSION THERAPY | Facility: HOSPITAL | Age: 63
End: 2018-02-20

## 2018-02-20 ENCOUNTER — OFFICE VISIT (OUTPATIENT)
Dept: HEMATOLOGY/ONCOLOGY | Facility: CLINIC | Age: 63
End: 2018-02-20
Payer: COMMERCIAL

## 2018-02-20 ENCOUNTER — INFUSION (OUTPATIENT)
Dept: INFUSION THERAPY | Facility: HOSPITAL | Age: 63
End: 2018-02-20
Attending: INTERNAL MEDICINE
Payer: COMMERCIAL

## 2018-02-20 VITALS
TEMPERATURE: 98 F | HEIGHT: 63 IN | HEART RATE: 72 BPM | RESPIRATION RATE: 17 BRPM | WEIGHT: 156.5 LBS | BODY MASS INDEX: 27.73 KG/M2 | DIASTOLIC BLOOD PRESSURE: 68 MMHG | SYSTOLIC BLOOD PRESSURE: 132 MMHG

## 2018-02-20 VITALS
SYSTOLIC BLOOD PRESSURE: 139 MMHG | DIASTOLIC BLOOD PRESSURE: 79 MMHG | WEIGHT: 156.5 LBS | RESPIRATION RATE: 17 BRPM | HEART RATE: 77 BPM | TEMPERATURE: 98 F | BODY MASS INDEX: 27.73 KG/M2

## 2018-02-20 DIAGNOSIS — C50.912 MALIGNANT NEOPLASM OF LEFT BREAST IN FEMALE, ESTROGEN RECEPTOR POSITIVE, UNSPECIFIED SITE OF BREAST: ICD-10-CM

## 2018-02-20 DIAGNOSIS — C77.1 CARCINOMA OF BREAST METASTATIC TO INTRATHORACIC LYMPH NODE, UNSPECIFIED LATERALITY: ICD-10-CM

## 2018-02-20 DIAGNOSIS — Z17.0 MALIGNANT NEOPLASM OF LEFT BREAST IN FEMALE, ESTROGEN RECEPTOR POSITIVE, UNSPECIFIED SITE OF BREAST: ICD-10-CM

## 2018-02-20 DIAGNOSIS — C50.112 MALIGNANT NEOPLASM OF CENTRAL PORTION OF LEFT BREAST IN FEMALE, ESTROGEN RECEPTOR POSITIVE: Primary | ICD-10-CM

## 2018-02-20 DIAGNOSIS — M62.838 MUSCLE SPASM: Primary | ICD-10-CM

## 2018-02-20 DIAGNOSIS — R11.0 CHRONIC NAUSEA: ICD-10-CM

## 2018-02-20 DIAGNOSIS — C50.112 MALIGNANT NEOPLASM OF CENTRAL PORTION OF LEFT BREAST IN FEMALE, ESTROGEN RECEPTOR POSITIVE: ICD-10-CM

## 2018-02-20 DIAGNOSIS — C50.919 CARCINOMA OF BREAST METASTATIC TO INTRATHORACIC LYMPH NODE, UNSPECIFIED LATERALITY: ICD-10-CM

## 2018-02-20 DIAGNOSIS — Z17.0 MALIGNANT NEOPLASM OF CENTRAL PORTION OF LEFT BREAST IN FEMALE, ESTROGEN RECEPTOR POSITIVE: ICD-10-CM

## 2018-02-20 DIAGNOSIS — R91.8 LUNG MASS: ICD-10-CM

## 2018-02-20 DIAGNOSIS — C79.51 CARCINOMA OF BREAST METASTATIC TO BONE, UNSPECIFIED LATERALITY: Primary | ICD-10-CM

## 2018-02-20 DIAGNOSIS — C50.919 CARCINOMA OF BREAST METASTATIC TO BONE, UNSPECIFIED LATERALITY: Primary | ICD-10-CM

## 2018-02-20 DIAGNOSIS — Z17.0 MALIGNANT NEOPLASM OF CENTRAL PORTION OF LEFT BREAST IN FEMALE, ESTROGEN RECEPTOR POSITIVE: Primary | ICD-10-CM

## 2018-02-20 PROCEDURE — 3008F BODY MASS INDEX DOCD: CPT | Mod: S$GLB,,, | Performed by: INTERNAL MEDICINE

## 2018-02-20 PROCEDURE — 63600175 PHARM REV CODE 636 W HCPCS: Mod: PN | Performed by: INTERNAL MEDICINE

## 2018-02-20 PROCEDURE — A4216 STERILE WATER/SALINE, 10 ML: HCPCS | Mod: PN | Performed by: INTERNAL MEDICINE

## 2018-02-20 PROCEDURE — 99214 OFFICE O/P EST MOD 30 MIN: CPT | Mod: S$GLB,,, | Performed by: INTERNAL MEDICINE

## 2018-02-20 PROCEDURE — 96365 THER/PROPH/DIAG IV INF INIT: CPT | Mod: PN

## 2018-02-20 PROCEDURE — 99999 PR PBB SHADOW E&M-EST. PATIENT-LVL III: CPT | Mod: PBBFAC,,, | Performed by: INTERNAL MEDICINE

## 2018-02-20 PROCEDURE — 96402 CHEMO HORMON ANTINEOPL SQ/IM: CPT | Mod: PN

## 2018-02-20 PROCEDURE — 25000003 PHARM REV CODE 250: Mod: PN | Performed by: INTERNAL MEDICINE

## 2018-02-20 RX ORDER — LAMOTRIGINE 25 MG/1
500 TABLET ORAL
Status: CANCELLED | OUTPATIENT
Start: 2018-02-20

## 2018-02-20 RX ORDER — CYCLOBENZAPRINE HCL 10 MG
10 TABLET ORAL 3 TIMES DAILY PRN
Qty: 60 TABLET | Refills: 2 | Status: SHIPPED | OUTPATIENT
Start: 2018-02-20

## 2018-02-20 RX ORDER — HEPARIN 100 UNIT/ML
500 SYRINGE INTRAVENOUS
Status: CANCELLED | OUTPATIENT
Start: 2018-02-20

## 2018-02-20 RX ORDER — DRONABINOL 5 MG/1
5 CAPSULE ORAL
Qty: 60 CAPSULE | Refills: 3 | Status: SHIPPED | OUTPATIENT
Start: 2018-02-20 | End: 2018-08-22 | Stop reason: SDUPTHER

## 2018-02-20 RX ORDER — SODIUM CHLORIDE 0.9 % (FLUSH) 0.9 %
10 SYRINGE (ML) INJECTION
Status: DISCONTINUED | OUTPATIENT
Start: 2018-02-20 | End: 2018-02-20 | Stop reason: HOSPADM

## 2018-02-20 RX ORDER — LAMOTRIGINE 25 MG/1
500 TABLET ORAL
Status: COMPLETED | OUTPATIENT
Start: 2018-02-20 | End: 2018-02-20

## 2018-02-20 RX ORDER — SODIUM CHLORIDE 0.9 % (FLUSH) 0.9 %
10 SYRINGE (ML) INJECTION
Status: CANCELLED | OUTPATIENT
Start: 2018-02-20

## 2018-02-20 RX ADMIN — FULVESTRANT 500 MG: 50 INJECTION INTRAMUSCULAR at 12:02

## 2018-02-20 RX ADMIN — SODIUM CHLORIDE, PRESERVATIVE FREE 10 ML: 5 INJECTION INTRAVENOUS at 11:02

## 2018-02-20 RX ADMIN — ZOLEDRONIC ACID 4 MG: 0.04 INJECTION, SOLUTION INTRAVENOUS at 12:02

## 2018-02-20 NOTE — TELEPHONE ENCOUNTER
hey i have a patient that i am needing to get scheduled for Faslodex only on 3/29  we had to cancel the other two appointments you alysha had scheduled because she is changing her date. M  MRN is [id number]

## 2018-02-21 ENCOUNTER — PATIENT MESSAGE (OUTPATIENT)
Dept: HEMATOLOGY/ONCOLOGY | Facility: CLINIC | Age: 63
End: 2018-02-21

## 2018-02-21 LAB — CANCER AG27-29 SERPL-ACNC: 34.2 U/ML

## 2018-03-08 ENCOUNTER — PATIENT MESSAGE (OUTPATIENT)
Dept: HEMATOLOGY/ONCOLOGY | Facility: CLINIC | Age: 63
End: 2018-03-08

## 2018-03-08 ENCOUNTER — AMBULATORY - HEALTHEAST (OUTPATIENT)
Dept: LAB | Facility: CLINIC | Age: 63
End: 2018-03-08

## 2018-03-08 DIAGNOSIS — C50.112 MALIGNANT NEOPLASM OF CENTRAL PORTION OF LEFT FEMALE BREAST (H): ICD-10-CM

## 2018-03-08 LAB
BASOPHILS # BLD AUTO: 0.1 THOU/UL (ref 0–0.2)
BASOPHILS NFR BLD AUTO: 2 % (ref 0–2)
EOSINOPHIL COUNT (ABSOLUTE): 0.1 THOU/UL (ref 0–0.4)
EOSINOPHIL NFR BLD AUTO: 4 % (ref 0–6)
ERYTHROCYTE [DISTWIDTH] IN BLOOD BY AUTOMATED COUNT: 19.5 % (ref 11–14.5)
HCT VFR BLD AUTO: 32.1 % (ref 35–47)
HGB BLD-MCNC: 11.3 G/DL (ref 12–16)
LYMPHOCYTES # BLD AUTO: 1.5 THOU/UL (ref 0.8–4.4)
LYMPHOCYTES NFR BLD AUTO: 54 % (ref 20–40)
MCH RBC QN AUTO: 38.2 PG (ref 27–34)
MCHC RBC AUTO-ENTMCNC: 35.2 G/DL (ref 32–36)
MCV RBC AUTO: 108 FL (ref 80–100)
MONOCYTES # BLD AUTO: 0.1 THOU/UL (ref 0–0.9)
MONOCYTES NFR BLD AUTO: 5 % (ref 2–10)
PLAT MORPH BLD: NORMAL
PLATELET # BLD AUTO: 210 THOU/UL (ref 140–440)
PMV BLD AUTO: 9.6 FL (ref 8.5–12.5)
RBC # BLD AUTO: 2.96 MILL/UL (ref 3.8–5.4)
TOTAL NEUTROPHILS-ABS(DIFF): 1 THOU/UL (ref 2–7.7)
TOTAL NEUTROPHILS-REL(DIFF): 35 % (ref 50–70)
WBC: 2.8 THOU/UL (ref 4–11)

## 2018-03-14 ENCOUNTER — TELEPHONE (OUTPATIENT)
Dept: PHARMACY | Facility: CLINIC | Age: 63
End: 2018-03-14

## 2018-03-15 ENCOUNTER — AMBULATORY - HEALTHEAST (OUTPATIENT)
Dept: LAB | Facility: CLINIC | Age: 63
End: 2018-03-15

## 2018-03-15 DIAGNOSIS — C50.112 MALIGNANT NEOPLASM OF CENTRAL PORTION OF LEFT FEMALE BREAST (H): ICD-10-CM

## 2018-03-15 LAB
ERYTHROCYTE [DISTWIDTH] IN BLOOD BY AUTOMATED COUNT: 19.6 % (ref 11–14.5)
HCT VFR BLD AUTO: 32 % (ref 35–47)
HGB BLD-MCNC: 11.3 G/DL (ref 12–16)
MCH RBC QN AUTO: 38.2 PG (ref 27–34)
MCHC RBC AUTO-ENTMCNC: 35.3 G/DL (ref 32–36)
MCV RBC AUTO: 108 FL (ref 80–100)
PLATELET # BLD AUTO: 166 THOU/UL (ref 140–440)
PMV BLD AUTO: 10.7 FL (ref 8.5–12.5)
RBC # BLD AUTO: 2.96 MILL/UL (ref 3.8–5.4)
WBC: 3 THOU/UL (ref 4–11)

## 2018-03-26 ENCOUNTER — LAB VISIT (OUTPATIENT)
Dept: LAB | Facility: HOSPITAL | Age: 63
End: 2018-03-26
Attending: INTERNAL MEDICINE
Payer: COMMERCIAL

## 2018-03-26 ENCOUNTER — INFUSION (OUTPATIENT)
Dept: INFUSION THERAPY | Facility: HOSPITAL | Age: 63
End: 2018-03-26
Attending: INTERNAL MEDICINE
Payer: COMMERCIAL

## 2018-03-26 VITALS
HEIGHT: 63 IN | HEART RATE: 66 BPM | TEMPERATURE: 98 F | DIASTOLIC BLOOD PRESSURE: 73 MMHG | WEIGHT: 160.38 LBS | SYSTOLIC BLOOD PRESSURE: 131 MMHG | BODY MASS INDEX: 28.42 KG/M2 | RESPIRATION RATE: 16 BRPM

## 2018-03-26 DIAGNOSIS — C79.51 CARCINOMA OF BREAST METASTATIC TO BONE, UNSPECIFIED LATERALITY: ICD-10-CM

## 2018-03-26 DIAGNOSIS — R91.8 LUNG MASS: ICD-10-CM

## 2018-03-26 DIAGNOSIS — C50.112 MALIGNANT NEOPLASM OF CENTRAL PORTION OF LEFT BREAST IN FEMALE, ESTROGEN RECEPTOR POSITIVE: ICD-10-CM

## 2018-03-26 DIAGNOSIS — Z17.0 MALIGNANT NEOPLASM OF LEFT BREAST IN FEMALE, ESTROGEN RECEPTOR POSITIVE, UNSPECIFIED SITE OF BREAST: ICD-10-CM

## 2018-03-26 DIAGNOSIS — C79.51 CARCINOMA OF BREAST METASTATIC TO BONE, UNSPECIFIED LATERALITY: Primary | ICD-10-CM

## 2018-03-26 DIAGNOSIS — C50.919 CARCINOMA OF BREAST METASTATIC TO BONE, UNSPECIFIED LATERALITY: Primary | ICD-10-CM

## 2018-03-26 DIAGNOSIS — C50.919 CARCINOMA OF BREAST METASTATIC TO INTRATHORACIC LYMPH NODE, UNSPECIFIED LATERALITY: ICD-10-CM

## 2018-03-26 DIAGNOSIS — C77.1 CARCINOMA OF BREAST METASTATIC TO INTRATHORACIC LYMPH NODE, UNSPECIFIED LATERALITY: ICD-10-CM

## 2018-03-26 DIAGNOSIS — C50.919 CARCINOMA OF BREAST METASTATIC TO BONE, UNSPECIFIED LATERALITY: ICD-10-CM

## 2018-03-26 DIAGNOSIS — C50.912 MALIGNANT NEOPLASM OF LEFT BREAST IN FEMALE, ESTROGEN RECEPTOR POSITIVE, UNSPECIFIED SITE OF BREAST: ICD-10-CM

## 2018-03-26 DIAGNOSIS — Z17.0 MALIGNANT NEOPLASM OF CENTRAL PORTION OF LEFT BREAST IN FEMALE, ESTROGEN RECEPTOR POSITIVE: ICD-10-CM

## 2018-03-26 PROBLEM — R11.15 PERSISTENT VOMITING: Status: RESOLVED | Noted: 2017-11-17 | Resolved: 2018-03-26

## 2018-03-26 LAB
ALBUMIN SERPL BCP-MCNC: 4.2 G/DL
ALP SERPL-CCNC: 64 U/L
ALT SERPL W/O P-5'-P-CCNC: 37 U/L
ANION GAP SERPL CALC-SCNC: 13 MMOL/L
AST SERPL-CCNC: 30 U/L
BASOPHILS # BLD AUTO: 0.05 K/UL
BASOPHILS NFR BLD: 1.3 %
BILIRUB SERPL-MCNC: 0.5 MG/DL
BUN SERPL-MCNC: 13 MG/DL
CALCIUM SERPL-MCNC: 9.2 MG/DL
CHLORIDE SERPL-SCNC: 103 MMOL/L
CO2 SERPL-SCNC: 28 MMOL/L
CREAT SERPL-MCNC: 0.64 MG/DL
DIFFERENTIAL METHOD: ABNORMAL
EOSINOPHIL # BLD AUTO: 0.2 K/UL
EOSINOPHIL NFR BLD: 5 %
ERYTHROCYTE [DISTWIDTH] IN BLOOD BY AUTOMATED COUNT: 19.7 %
EST. GFR  (AFRICAN AMERICAN): >60 ML/MIN/1.73 M^2
EST. GFR  (NON AFRICAN AMERICAN): >60 ML/MIN/1.73 M^2
GLUCOSE SERPL-MCNC: 129 MG/DL
HCT VFR BLD AUTO: 33.3 %
HGB BLD-MCNC: 11.6 G/DL
LYMPHOCYTES # BLD AUTO: 1.6 K/UL
LYMPHOCYTES NFR BLD: 39.9 %
MCH RBC QN AUTO: 38.5 PG
MCHC RBC AUTO-ENTMCNC: 34.8 G/DL
MCV RBC AUTO: 111 FL
MONOCYTES # BLD AUTO: 0.4 K/UL
MONOCYTES NFR BLD: 9.8 %
NEUTROPHILS # BLD AUTO: 1.8 K/UL
NEUTROPHILS NFR BLD: 44 %
NRBC BLD-RTO: 0 /100 WBC
PLATELET # BLD AUTO: 279 K/UL
PMV BLD AUTO: 9.9 FL
POTASSIUM SERPL-SCNC: 4.5 MMOL/L
PROT SERPL-MCNC: 7.3 G/DL
RBC # BLD AUTO: 3.01 M/UL
SODIUM SERPL-SCNC: 144 MMOL/L
WBC # BLD AUTO: 3.98 K/UL

## 2018-03-26 PROCEDURE — 85025 COMPLETE CBC W/AUTO DIFF WBC: CPT

## 2018-03-26 PROCEDURE — 80053 COMPREHEN METABOLIC PANEL: CPT

## 2018-03-26 PROCEDURE — 86300 IMMUNOASSAY TUMOR CA 15-3: CPT

## 2018-03-26 PROCEDURE — 85025 COMPLETE CBC W/AUTO DIFF WBC: CPT | Mod: PN

## 2018-03-26 PROCEDURE — 63600175 PHARM REV CODE 636 W HCPCS: Mod: TB,PN | Performed by: INTERNAL MEDICINE

## 2018-03-26 PROCEDURE — 36415 COLL VENOUS BLD VENIPUNCTURE: CPT | Mod: PN

## 2018-03-26 PROCEDURE — 96402 CHEMO HORMON ANTINEOPL SQ/IM: CPT | Mod: PN

## 2018-03-26 PROCEDURE — 80053 COMPREHEN METABOLIC PANEL: CPT | Mod: PN

## 2018-03-26 RX ORDER — LAMOTRIGINE 25 MG/1
500 TABLET ORAL
Status: COMPLETED | OUTPATIENT
Start: 2018-03-26 | End: 2018-03-26

## 2018-03-26 RX ORDER — LAMOTRIGINE 25 MG/1
500 TABLET ORAL
Status: CANCELLED | OUTPATIENT
Start: 2018-03-26

## 2018-03-26 RX ADMIN — FULVESTRANT 500 MG: 50 INJECTION INTRAMUSCULAR at 10:03

## 2018-03-26 NOTE — PROGRESS NOTES
Subjective:       Patient ID: Georgia Jeffrey is a 62 y.o. female.    Chief Complaint: No chief complaint on file.    HPI Ms. Jeffrey is a 62-year-old female seen for metastatic carcinoma of the left breast.      She is currently on Faslodex and palbociclib.  In addition she is on Zometa for all metastasis.  At the time of her last visit in January, her PET scan showed some increased uptake in the T2 lamina, however other areas had improved, specifically intrathoracic claudine metastasis and lung metastasis.    She had outside blood work on our chest 8 which showed a white count 2800 with a neutrophil count of 35% total absolute count of 1000.  Her Ibrance was held.  Follow-up CBC on March 15 showed white count of 3000.    While she was away she developed a respiratory infection with some cough and congestion and sneezing.  That gradually improving.  Her other major complaint is some fatigue.  She's tried to start doing some walking.  Her upper back pain has been variable in intensity but fairly constant.  Appetite is unchanged and her bowel function is stably irregular.      Breast History: She developed palpable abnormality in her left breas in the early part of 2017.    Diagnostic mammography from April 18, 2017 showed an irregular mass with pleomorphic calcifications at the 9:00 area of the left breast.  By ultrasound this was solid mass measuring 36 mm.    Breast biopsy on April 19, 2017 showed infiltrating ductal carcinoma (histologic grade 3, nuclear grade 2, mitotic index 3) tumor was 99% ER positive, 91% DC positive.  HER-2 was negative by FISH.    On May 11, 2017 she underwent bilateral nipple sparing mastectomies by Dr.Carl Gutierrez and autologous breast reconstruction by  at [facility].  She developed some compromise of her left flap which was removed and an implant was placed.  That became infected and was removed.  Several weeks ago she underwent removal of residual  reconstruction from both sides.    The pathology from that surgery showed a 3 cm high grade grading ductal carcinoma (3+3+2).  There is associated lymphovascular invasion.  In addition was associated high-grade DCIS.  [place] lymph node was negative.    The right breast showed no evidence of any abnormality.  Final pathological stage TII N0 stage IIA.  Oncotype was 31.    She was seen by medical oncology and chemotherapy was discussed.     PET/CT scan was performed on June 27 which showed increased uptake in the subcarinal node measuring 9 mm an SUV of 5.9, there was increased uptake in the right hilum with an SUV of 5.9, there were multiple pulmonary nodules on the right side a right middle lobe nodule measured 16 mm with an SUV of 6.8 right lower lobe nodule 9 mm with an SUV of 2.6.  In addition there were multiple other subcentimeter nodules.  Increased uptake was seen in the right intertrochanteric femur with an SUV of 4.  (Subsequent MRI of the right femur on July 14 did not show any bony abnormality).    On July 14 she underwent bronchoscopy and endoscopic ultrasound.  Left lower lobe brush sample was positive for carcinoma and a fine-needle aspirate from a station 7 lymph node was also positive for carcinoma.  These were estrogen receptor positive.    In July 2017 she was started on systemic therapy with Faslodex and palbociclib.  In August 2017 she developed a DVT of the left lower extremity with the peroneal and posterior tibial veins exhibiting thrombosis.  She was started on apixaban at that time.  Follow-up PET scan in September  suggested bone metastasis.    Due to insurance  issues she was changed to letrozole in October 2017 and continued her palbociclib. She had significant nausea and vomiting secondary to letrozole and that was discontinued.      On December 1, 2017 she underwent MRI scanning of the thoracic and lumbar spine.  That showed a metastasis at the left T2 lamina.  The lumbar spine was  negative for malignancy Subsequent MRI scans on December 4 were negative in the cervical spine and brain were all negative for malignancy.  Chest x-ray in December 4 was clear.    She was off all therapy for about 6 weeks then restarted  faslodex and palbociclib on  12/17/17.    On January 9, 2018 she began Zometa therapy.      PET from January - 2017:showed a T2 left laminar lesion with SUV increased from prior PET/CT value.  Increase value L5 is felt to be related to degenerative change,  The right hilar and mediastinal lymphadenopathy demonstrates only background activity.  1.1 cm right middle lobe nodule has minimal hypermetabolic activity.  There were subcentimeter nodules in the medial basal segments of right and left lower lobes below threshold for detection by PET.      Review of Systems   Constitutional: Positive for activity change and fatigue. Negative for diaphoresis and unexpected weight change.   HENT: Negative for trouble swallowing.    Respiratory: Negative for cough and shortness of breath.    Cardiovascular: Negative for chest pain.   Gastrointestinal: Positive for constipation and diarrhea. Negative for abdominal pain, nausea and vomiting.   Musculoskeletal: Positive for back pain. Negative for neck pain.   Skin: Negative for rash.   Psychiatric/Behavioral: Negative for dysphoric mood. The patient is not nervous/anxious.        Objective:      Physical Exam   Constitutional: She is oriented to person, place, and time. She appears well-developed and well-nourished. No distress.   HENT:   Mouth/Throat: No oropharyngeal exudate.   Eyes: No scleral icterus.   Cardiovascular: Normal rate and regular rhythm.    Pulmonary/Chest: Effort normal and breath sounds normal. She has no wheezes. She has no rales.       Abdominal: Soft. She exhibits no mass. There is no tenderness.   Lymphadenopathy:     She has no cervical adenopathy.   Neurological: She is alert and oriented to person, place, and time.    Psychiatric: She has a normal mood and affect. Her behavior is normal. Thought content normal.   Vitals reviewed.      Assessment:     Labs metabolic profiles unremarkable, CBC shows white count of 3980 with an ANC of 1800.  1. Malignant neoplasm of central portion of left breast in female, estrogen receptor positive    2. 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  RTC 1 Month with labs and PET.    We'll arrange for radiation oncology consultation to consider local treatment for her chronic upper back pain.    Zometa every 3 months.  Distress Screening Results: Psychosocial Distress screening score of Distress Score: 3 noted and reviewed. No intervention indicated.

## 2018-03-26 NOTE — PLAN OF CARE
Problem: Patient Care Overview  Goal: Plan of Care Review  Outcome: Ongoing (interventions implemented as appropriate)  Pt tolerated Faslodex injections well.  Instructed to call MD with any problems.

## 2018-03-27 ENCOUNTER — TELEPHONE (OUTPATIENT)
Dept: HEMATOLOGY/ONCOLOGY | Facility: CLINIC | Age: 63
End: 2018-03-27

## 2018-03-27 ENCOUNTER — OFFICE VISIT (OUTPATIENT)
Dept: HEMATOLOGY/ONCOLOGY | Facility: CLINIC | Age: 63
End: 2018-03-27
Payer: COMMERCIAL

## 2018-03-27 VITALS
WEIGHT: 156.5 LBS | BODY MASS INDEX: 27.73 KG/M2 | RESPIRATION RATE: 17 BRPM | TEMPERATURE: 98 F | HEART RATE: 67 BPM | SYSTOLIC BLOOD PRESSURE: 134 MMHG | DIASTOLIC BLOOD PRESSURE: 81 MMHG

## 2018-03-27 DIAGNOSIS — C77.1 CARCINOMA OF BREAST METASTATIC TO INTRATHORACIC LYMPH NODE, UNSPECIFIED LATERALITY: ICD-10-CM

## 2018-03-27 DIAGNOSIS — C50.112 MALIGNANT NEOPLASM OF CENTRAL PORTION OF LEFT BREAST IN FEMALE, ESTROGEN RECEPTOR POSITIVE: Primary | ICD-10-CM

## 2018-03-27 DIAGNOSIS — C50.919 CARCINOMA OF BREAST METASTATIC TO BONE, UNSPECIFIED LATERALITY: ICD-10-CM

## 2018-03-27 DIAGNOSIS — Z17.0 MALIGNANT NEOPLASM OF CENTRAL PORTION OF LEFT BREAST IN FEMALE, ESTROGEN RECEPTOR POSITIVE: Primary | ICD-10-CM

## 2018-03-27 DIAGNOSIS — C79.51 CARCINOMA OF BREAST METASTATIC TO BONE, UNSPECIFIED LATERALITY: ICD-10-CM

## 2018-03-27 DIAGNOSIS — C50.919 CARCINOMA OF BREAST METASTATIC TO INTRATHORACIC LYMPH NODE, UNSPECIFIED LATERALITY: ICD-10-CM

## 2018-03-27 PROCEDURE — 99214 OFFICE O/P EST MOD 30 MIN: CPT | Mod: S$GLB,,, | Performed by: INTERNAL MEDICINE

## 2018-03-27 PROCEDURE — 99999 PR PBB SHADOW E&M-EST. PATIENT-LVL III: CPT | Mod: PBBFAC,,, | Performed by: INTERNAL MEDICINE

## 2018-03-27 NOTE — TELEPHONE ENCOUNTER
----- Message from [employer] sent at 3/27/2018  1:44 PM CDT -----  Contact: Ryan  from [facility]  Ryan from [facility] calling have a few questions about Pt  Callback Number [phone]  Thank You  [employer]

## 2018-03-28 LAB — CANCER AG27-29 SERPL-ACNC: 43.3 U/ML

## 2018-04-02 ENCOUNTER — DOCUMENTATION ONLY (OUTPATIENT)
Dept: HEMATOLOGY/ONCOLOGY | Facility: CLINIC | Age: 63
End: 2018-04-02

## 2018-04-02 NOTE — PROGRESS NOTES
Note received from  at [facility].  He has seen Ms. Jeffrey and is planning to start palliative radiation therapy to the T2 area.  Imaging at his office showed an area of bony excavation cortical disruption at the T2 vertebra.

## 2018-04-04 ENCOUNTER — TELEPHONE (OUTPATIENT)
Dept: PHARMACY | Facility: CLINIC | Age: 63
End: 2018-04-04

## 2018-04-10 ENCOUNTER — LAB VISIT (OUTPATIENT)
Dept: LAB | Facility: HOSPITAL | Age: 63
End: 2018-04-10
Attending: INTERNAL MEDICINE
Payer: COMMERCIAL

## 2018-04-10 DIAGNOSIS — C50.112 MALIGNANT NEOPLASM OF CENTRAL PORTION OF LEFT BREAST IN FEMALE, ESTROGEN RECEPTOR POSITIVE: ICD-10-CM

## 2018-04-10 DIAGNOSIS — Z17.0 MALIGNANT NEOPLASM OF CENTRAL PORTION OF LEFT BREAST IN FEMALE, ESTROGEN RECEPTOR POSITIVE: ICD-10-CM

## 2018-04-10 LAB
ANISOCYTOSIS BLD QL SMEAR: SLIGHT
BASOPHILS # BLD AUTO: 0.02 K/UL
BASOPHILS NFR BLD: 0.7 %
DIFFERENTIAL METHOD: ABNORMAL
EOSINOPHIL # BLD AUTO: 0.1 K/UL
EOSINOPHIL NFR BLD: 5.2 %
ERYTHROCYTE [DISTWIDTH] IN BLOOD BY AUTOMATED COUNT: 18.1 %
HCT VFR BLD AUTO: 31.1 %
HGB BLD-MCNC: 10.7 G/DL
HYPOCHROMIA BLD QL SMEAR: ABNORMAL
LYMPHOCYTES # BLD AUTO: 1.4 K/UL
LYMPHOCYTES NFR BLD: 53.2 %
MCH RBC QN AUTO: 38.5 PG
MCHC RBC AUTO-ENTMCNC: 34.4 G/DL
MCV RBC AUTO: 112 FL
MONOCYTES # BLD AUTO: 0.2 K/UL
MONOCYTES NFR BLD: 6.3 %
NEUTROPHILS # BLD AUTO: 0.9 K/UL
NEUTROPHILS NFR BLD: 34.6 %
NRBC BLD-RTO: 0 /100 WBC
PLATELET # BLD AUTO: 140 K/UL
PMV BLD AUTO: 10.6 FL
RBC # BLD AUTO: 2.78 M/UL
WBC # BLD AUTO: 2.69 K/UL

## 2018-04-10 PROCEDURE — 85025 COMPLETE CBC W/AUTO DIFF WBC: CPT | Mod: PN

## 2018-04-10 PROCEDURE — 85025 COMPLETE CBC W/AUTO DIFF WBC: CPT

## 2018-04-10 PROCEDURE — 36415 COLL VENOUS BLD VENIPUNCTURE: CPT | Mod: PN

## 2018-04-13 RX ORDER — LAMOTRIGINE 25 MG/1
500 TABLET ORAL
Status: CANCELLED | OUTPATIENT
Start: 2018-04-24

## 2018-04-19 ENCOUNTER — PATIENT MESSAGE (OUTPATIENT)
Dept: HEMATOLOGY/ONCOLOGY | Facility: CLINIC | Age: 63
End: 2018-04-19

## 2018-04-19 ENCOUNTER — TELEPHONE (OUTPATIENT)
Dept: INFUSION THERAPY | Facility: HOSPITAL | Age: 63
End: 2018-04-19

## 2018-04-19 NOTE — TELEPHONE ENCOUNTER
----- Message from Miranda Villa sent at 4/19/2018  2:16 PM CDT -----  Caller: Letitia with [facility]. [address]                  Callback number: [phone] # [phone]                       Reason: Needs to move patients apt. That is scheduled for tomorrow                 Thank you

## 2018-04-19 NOTE — TELEPHONE ENCOUNTER
Called and spoke with patient and assisted with changing her apts. Labs tomorrow in [facility](per her request), PET on 4/23 at 2pm at the main [place], Dr. Whatley at 3:45pm on 4/23 at [facility](address given to the patient), injection on 4/24 at [facility]. Patient confirmed all new apts made.

## 2018-04-20 ENCOUNTER — LAB VISIT (OUTPATIENT)
Dept: LAB | Facility: HOSPITAL | Age: 63
End: 2018-04-20
Attending: INTERNAL MEDICINE
Payer: COMMERCIAL

## 2018-04-20 DIAGNOSIS — C50.112 MALIGNANT NEOPLASM OF CENTRAL PORTION OF LEFT BREAST IN FEMALE, ESTROGEN RECEPTOR POSITIVE: ICD-10-CM

## 2018-04-20 DIAGNOSIS — Z17.0 MALIGNANT NEOPLASM OF CENTRAL PORTION OF LEFT BREAST IN FEMALE, ESTROGEN RECEPTOR POSITIVE: ICD-10-CM

## 2018-04-20 LAB
ALBUMIN SERPL BCP-MCNC: 4.1 G/DL
ALP SERPL-CCNC: 53 U/L
ALT SERPL W/O P-5'-P-CCNC: 28 U/L
ANION GAP SERPL CALC-SCNC: 10 MMOL/L
ANISOCYTOSIS BLD QL SMEAR: ABNORMAL
AST SERPL-CCNC: 22 U/L
BASOPHILS # BLD AUTO: 0.01 K/UL
BASOPHILS NFR BLD: 0.4 %
BILIRUB SERPL-MCNC: 0.8 MG/DL
BUN SERPL-MCNC: 11 MG/DL
CALCIUM SERPL-MCNC: 9.8 MG/DL
CHLORIDE SERPL-SCNC: 100 MMOL/L
CO2 SERPL-SCNC: 29 MMOL/L
CREAT SERPL-MCNC: 0.63 MG/DL
DIFFERENTIAL METHOD: ABNORMAL
EOSINOPHIL # BLD AUTO: 0.1 K/UL
EOSINOPHIL NFR BLD: 2.7 %
ERYTHROCYTE [DISTWIDTH] IN BLOOD BY AUTOMATED COUNT: 17.3 %
EST. GFR  (AFRICAN AMERICAN): >60 ML/MIN/1.73 M^2
EST. GFR  (NON AFRICAN AMERICAN): >60 ML/MIN/1.73 M^2
GLUCOSE SERPL-MCNC: 108 MG/DL
HCT VFR BLD AUTO: 30.1 %
HGB BLD-MCNC: 10.9 G/DL
HYPOCHROMIA BLD QL SMEAR: ABNORMAL
LYMPHOCYTES # BLD AUTO: 1.4 K/UL
LYMPHOCYTES NFR BLD: 52.5 %
MCH RBC QN AUTO: 40.1 PG
MCHC RBC AUTO-ENTMCNC: 36.2 G/DL
MCV RBC AUTO: 111 FL
MONOCYTES # BLD AUTO: 0.3 K/UL
MONOCYTES NFR BLD: 10.5 %
NEUTROPHILS # BLD AUTO: 0.9 K/UL
NEUTROPHILS NFR BLD: 33.9 %
NRBC BLD-RTO: 0 /100 WBC
PLATELET # BLD AUTO: 156 K/UL
PLATELET BLD QL SMEAR: ABNORMAL
PMV BLD AUTO: 10.3 FL
POLYCHROMASIA BLD QL SMEAR: ABNORMAL
POTASSIUM SERPL-SCNC: 5.2 MMOL/L
PROT SERPL-MCNC: 7.3 G/DL
RBC # BLD AUTO: 2.72 M/UL
SODIUM SERPL-SCNC: 139 MMOL/L
WBC # BLD AUTO: 2.57 K/UL

## 2018-04-20 PROCEDURE — 85025 COMPLETE CBC W/AUTO DIFF WBC: CPT | Mod: PN

## 2018-04-20 PROCEDURE — 36415 COLL VENOUS BLD VENIPUNCTURE: CPT | Mod: PN

## 2018-04-20 PROCEDURE — 80053 COMPREHEN METABOLIC PANEL: CPT

## 2018-04-20 PROCEDURE — 80053 COMPREHEN METABOLIC PANEL: CPT | Mod: PN

## 2018-04-20 PROCEDURE — 86300 IMMUNOASSAY TUMOR CA 15-3: CPT

## 2018-04-20 PROCEDURE — 85025 COMPLETE CBC W/AUTO DIFF WBC: CPT

## 2018-04-23 ENCOUNTER — HOSPITAL ENCOUNTER (OUTPATIENT)
Dept: RADIOLOGY | Facility: HOSPITAL | Age: 63
Discharge: HOME | End: 2018-04-23
Attending: INTERNAL MEDICINE
Payer: COMMERCIAL

## 2018-04-23 ENCOUNTER — OFFICE VISIT (OUTPATIENT)
Dept: HEMATOLOGY/ONCOLOGY | Facility: CLINIC | Age: 63
End: 2018-04-23
Attending: INTERNAL MEDICINE
Payer: COMMERCIAL

## 2018-04-23 VITALS
OXYGEN SATURATION: 99 % | SYSTOLIC BLOOD PRESSURE: 120 MMHG | WEIGHT: 154.13 LBS | TEMPERATURE: 99 F | RESPIRATION RATE: 16 BRPM | HEART RATE: 85 BPM | BODY MASS INDEX: 26.31 KG/M2 | DIASTOLIC BLOOD PRESSURE: 85 MMHG | HEIGHT: 64 IN

## 2018-04-23 DIAGNOSIS — C79.51 CARCINOMA OF LEFT BREAST METASTATIC TO BONE: ICD-10-CM

## 2018-04-23 DIAGNOSIS — Z17.0 MALIGNANT NEOPLASM OF CENTRAL PORTION OF LEFT BREAST IN FEMALE, ESTROGEN RECEPTOR POSITIVE: Primary | ICD-10-CM

## 2018-04-23 DIAGNOSIS — C50.919 CARCINOMA OF BREAST METASTATIC TO BONE, UNSPECIFIED LATERALITY: ICD-10-CM

## 2018-04-23 DIAGNOSIS — C50.912 CARCINOMA OF LEFT BREAST METASTATIC TO BONE: ICD-10-CM

## 2018-04-23 DIAGNOSIS — C79.51 CARCINOMA OF BREAST METASTATIC TO BONE, UNSPECIFIED LATERALITY: ICD-10-CM

## 2018-04-23 DIAGNOSIS — C50.112 MALIGNANT NEOPLASM OF CENTRAL PORTION OF LEFT BREAST IN FEMALE, ESTROGEN RECEPTOR POSITIVE: ICD-10-CM

## 2018-04-23 DIAGNOSIS — C50.112 MALIGNANT NEOPLASM OF CENTRAL PORTION OF LEFT BREAST IN FEMALE, ESTROGEN RECEPTOR POSITIVE: Primary | ICD-10-CM

## 2018-04-23 DIAGNOSIS — Z17.0 MALIGNANT NEOPLASM OF CENTRAL PORTION OF LEFT BREAST IN FEMALE, ESTROGEN RECEPTOR POSITIVE: ICD-10-CM

## 2018-04-23 PROCEDURE — A9552 F18 FDG: HCPCS

## 2018-04-23 PROCEDURE — 78815 PET IMAGE W/CT SKULL-THIGH: CPT | Mod: 26,PS,, | Performed by: RADIOLOGY

## 2018-04-23 PROCEDURE — 99999 PR PBB SHADOW E&M-EST. PATIENT-LVL III: CPT | Mod: PBBFAC,,, | Performed by: INTERNAL MEDICINE

## 2018-04-23 PROCEDURE — 99214 OFFICE O/P EST MOD 30 MIN: CPT | Mod: S$GLB,,, | Performed by: INTERNAL MEDICINE

## 2018-04-23 PROCEDURE — 78815 PET IMAGE W/CT SKULL-THIGH: CPT | Mod: TC

## 2018-04-23 RX ORDER — EVEROLIMUS 10 MG/1
10 TABLET ORAL DAILY
Qty: 30 TABLET | Refills: 10 | Status: SHIPPED | OUTPATIENT
Start: 2018-04-23 | End: 2018-11-27

## 2018-04-23 RX ORDER — EXEMESTANE 25 MG/1
25 TABLET ORAL DAILY
Qty: 30 TABLET | Refills: 11 | Status: SHIPPED | OUTPATIENT
Start: 2018-04-23 | End: 2018-05-23

## 2018-04-23 NOTE — PROGRESS NOTES
Subjective:       Patient ID: Georgia Jeffrey is a 62 y.o. female.    Chief Complaint: No chief complaint on file.    HPI Ms. Jeffrey is a 62-year-old female seen for metastatic carcinoma of the left breast.       She is currently on Faslodex and palbociclib.  In addition she is on Zometa for all metastasis.       She is receiving radiation to her T 2 metastasis with Dr. Witt on the [facility]. She has had 4 treatments thus far.  She will have 10-15 treatments.  She reports her major side effect is been some severe fatigue.  She's remained capable of doing all of her normal activities but has to struggle with that.  She has some slight scratchiness in her throat which she relates to the radiation.  Her bowels have been variable.  She has no shortness of breath.       Breast  History: She developed palpable abnormality in her left breas in the early part of 2017.    Diagnostic mammography from April 18, 2017 showed an irregular mass with pleomorphic calcifications at the 9:00 area of the left breast.  By ultrasound this was solid mass measuring 36 mm.     Breast biopsy on April 19, 2017 showed infiltrating ductal carcinoma (histologic grade 3, nuclear grade 2, mitotic index 3) tumor was 99% ER positive, 91% [place] positive.  HER-2 was negative by FISH.     On May 11, 2017 she underwent bilateral nipple sparing mastectomies by Dr.Karl Gutierrez and autologous breast reconstruction by  at [facility].  She developed some compromise of her left flap which was removed and an implant was placed.  That became infected and was removed.  Several weeks ago she underwent removal of residual reconstruction from both sides.     The pathology from that surgery showed a 3 cm high grade grading ductal carcinoma (3+3+2).  There is associated lymphovascular invasion.  In addition was associated high-grade DCIS.  [place] lymph node was negative.    The right breast showed no evidence of any  abnormality.  Final pathological stage TII N0 stage IIA.  Oncotype was 31.     She was seen by medical oncology and chemotherapy was discussed.      PET/CT scan was performed on June 27 which showed increased uptake in the subcarinal node measuring 9 mm an SUV of 5.9, there was increased uptake in the right hilum with an SUV of 5.9, there were multiple pulmonary nodules on the right side a right middle lobe nodule measured 16 mm with an SUV of 6.8 right lower lobe nodule 9 mm with an SUV of 2.6.  In addition there were multiple other subcentimeter nodules.  Increased uptake was seen in the right intertrochanteric femur with an SUV of 4.  (Subsequent MRI of the right femur on July 14 did not show any bony abnormality).     On July 14 she underwent bronchoscopy and endoscopic ultrasound.  Left lower lobe brush sample was positive for carcinoma and a fine-needle aspirate from a station 7 lymph node was also positive for carcinoma.  These were estrogen receptor positive.     In July 2017 she was started on systemic therapy with Faslodex and palbociclib.  In August 2017 she developed a DVT of the left lower extremity with the peroneal and posterior tibial veins exhibiting thrombosis.  She was started on apixaban at that time.  Follow-up PET scan in September  suggested bone metastasis.     Due to insurance  issues she was changed to letrozole in October 2017 and continued her palbociclib. She had significant nausea and vomiting secondary to letrozole and that was discontinued.        On December 1, 2017 she underwent MRI scanning of the thoracic and lumbar spine.  That showed a metastasis at the left T2 lamina.  The lumbar spine was negative for malignancy Subsequent MRI scans on December 4 were negative in the cervical spine and brain were all negative for malignancy.  Chest x-ray in December 4 was clear.     She was off all therapy for about 6 weeks then restarted  faslodex and palbociclib on  12/17/17.     On January  9, 2018 she began Zometa therapy.  Review of Systems   Constitutional: Positive for appetite change. Negative for unexpected weight change.   Eyes: Positive for visual disturbance.   Respiratory: Negative for cough and shortness of breath.    Cardiovascular: Negative for chest pain.   Gastrointestinal: Positive for diarrhea. Negative for abdominal pain.   Genitourinary: Negative for frequency.   Musculoskeletal: Positive for back pain.   Skin: Negative for rash.   Neurological: Positive for headaches.   Hematological: Negative for adenopathy.   Psychiatric/Behavioral: The patient is nervous/anxious.        Objective:      Physical Exam   Constitutional: She is oriented to person, place, and time. She appears well-developed and well-nourished.   HENT:   Mouth/Throat: No oropharyngeal exudate.   Eyes: No scleral icterus.   Cardiovascular: Normal rate and regular rhythm.    Pulmonary/Chest: Effort normal and breath sounds normal. She has no wheezes. She has no rales.   Abdominal: Soft. She exhibits no mass. There is no tenderness.   Lymphadenopathy:     She has no cervical adenopathy.     She has axillary adenopathy.        Left axillary: Pectoral adenopathy: 1-2  cm         Right: No supraclavicular adenopathy present.        Left: No supraclavicular adenopathy present.   Neurological: She is alert and oriented to person, place, and time.   Psychiatric: She has a normal mood and affect. Her behavior is normal. Thought content normal.   Vitals reviewed.      Assessment:     PET scan -  no physical reading but appears to show a new left axillary lymph node and a new left sacral metastasis     CBC shows white count of 2570 with an ANC of 900, hemoglobin 10.9 and platelet count 156,000, metabolic profile is normal other than potassium of 5.2.  1. Malignant neoplasm of central portion of left breast in female, estrogen receptor positive    2. 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  Based on her lab work and scans she appears to have progressive disease.  I recommended that we discontinue her palbociclib and Faslodex and changed to exemestane and Afinitor.  She'll continue Zometa.

## 2018-04-24 ENCOUNTER — TELEPHONE (OUTPATIENT)
Dept: PHARMACY | Facility: CLINIC | Age: 63
End: 2018-04-24

## 2018-04-24 ENCOUNTER — TELEPHONE (OUTPATIENT)
Dept: INFUSION THERAPY | Facility: HOSPITAL | Age: 63
End: 2018-04-24

## 2018-04-24 ENCOUNTER — PATIENT MESSAGE (OUTPATIENT)
Dept: HEMATOLOGY/ONCOLOGY | Facility: CLINIC | Age: 63
End: 2018-04-24

## 2018-04-24 LAB
CANCER AG27-29 SERPL-ACNC: 40 U/ML
POCT GLUCOSE: 72 MG/DL (ref 70–110)

## 2018-04-24 NOTE — TELEPHONE ENCOUNTER
[4/24/2018 9:38 AM] Pamela Walters:   Good morning, i have a patient that is needing to get Zometa at your location on 5/22. Can you help me with scheduling this   MRN is [id number]  [4/24/2018 9:44 AM] Evelyn Caba:   I can do 05/22/18 @2:30 on mrn [id number]

## 2018-04-25 ENCOUNTER — TELEPHONE (OUTPATIENT)
Dept: PHARMACY | Facility: CLINIC | Age: 63
End: 2018-04-25

## 2018-04-25 DIAGNOSIS — Z17.0 MALIGNANT NEOPLASM OF CENTRAL PORTION OF LEFT BREAST IN FEMALE, ESTROGEN RECEPTOR POSITIVE: Primary | ICD-10-CM

## 2018-04-25 DIAGNOSIS — C50.112 MALIGNANT NEOPLASM OF CENTRAL PORTION OF LEFT BREAST IN FEMALE, ESTROGEN RECEPTOR POSITIVE: Primary | ICD-10-CM

## 2018-04-25 RX ORDER — DEXAMETHASONE 0.5 MG/5ML
ELIXIR ORAL
Qty: 1000 ML | Refills: 2 | Status: SHIPPED | OUTPATIENT
Start: 2018-04-25 | End: 2018-11-27

## 2018-04-25 NOTE — TELEPHONE ENCOUNTER
Patient advised on dexmaethasone solution directions to prevent mouth sores.  Swish 10ml for 2 minutes then spit out four times daily.

## 2018-04-25 NOTE — TELEPHONE ENCOUNTER
Initial Afinitor consult completed on 2018 . Medication will be shipped on 2018 to arrive at patient's home on 2018 via [employer]. $0.00 copay. Patient plans to start Afinitor 2018. Address confirmed. Confirmed 2 patient identifiers - name and . Therapy Appropriate.     Patient was counseled on the administration directions for Afinitor:  - Take 1 tablet (10mg) by mouth once daily with or without food, at the same time of the day.    - Do not chew, crush, or break the tablets. If possible, patient was instructed tip the tablets from the RX bottle to the cap, and take directly from the cap to the mouth.  Patient may handle the medication with their hands if they wear with a latex or nitrile glove and wash their hands before and after handling the tablets.    Patient was counseled on the following possible side effects, which include, but are not limited to: Hypertension, nausea, vomiting, diarrhea, decreased appetite, skin rash, hand foot syndrome, swelling, fatigue, headache, hyperglycemia, mouth sores, increased risk for infection and bleeding. Patient given hydrocortisone cream for dermatitis.     DDIs: Medication list reviewed, none expected with Afinitor    Patient was given 2 patient education handouts on how to handle oral chemotherapy and specific recommendations- do's and don'ts. Instructed the patient that if they have any remaining oral chemotherapy, not to flush down the toilet or throw away in the trash; the patient or caregiver should return the unused oral chemotherapy to either the clinic or to myself in the Pharmacy where the oral chemotherapy can be disposed of properly.     Patient confirmed understanding. Patient did not have additional questions.  Patient plans to start Afinitor on 2018. Consultation included: indication; goals of treatment; administration; storage and handling; side effects; how to handle side effects; the importance of compliance; how to handle missed  doses; the importance of laboratory monitoring; the importance of keeping all follow up appointments.  Patient understands to report any medication changes to [facility] and provider. All questions answered and addressed to patients satisfaction. I will f/u with patient in 1 week from start, [facility] to contact patient in 3 weeks for refills.

## 2018-04-27 ENCOUNTER — TELEPHONE (OUTPATIENT)
Dept: INFUSION THERAPY | Facility: HOSPITAL | Age: 63
End: 2018-04-27

## 2018-04-27 NOTE — TELEPHONE ENCOUNTER
[4/27/2018 10:20 AM] Pmaela aWlters:   Good morning, i have a patient MRN [id number]  she is scheduled for Zometa on 5/22 we are needing it changed to 5/24 please   [4/27/2018 10:22 AM] Evelyn Caba:   zometa 05/24/18 @1:30 for mrn [id number]

## 2018-05-04 ENCOUNTER — OFFICE VISIT (OUTPATIENT)
Dept: HEMATOLOGY/ONCOLOGY | Facility: CLINIC | Age: 63
DRG: 392 | End: 2018-05-04
Payer: COMMERCIAL

## 2018-05-04 ENCOUNTER — HOSPITAL ENCOUNTER (INPATIENT)
Facility: HOSPITAL | Age: 63
LOS: 5 days | Discharge: HOME | DRG: 392 | End: 2018-05-09
Attending: INTERNAL MEDICINE | Admitting: INTERNAL MEDICINE
Payer: COMMERCIAL

## 2018-05-04 ENCOUNTER — INFUSION (OUTPATIENT)
Dept: INFUSION THERAPY | Facility: HOSPITAL | Age: 63
End: 2018-05-04
Attending: INTERNAL MEDICINE
Payer: COMMERCIAL

## 2018-05-04 VITALS
WEIGHT: 149.5 LBS | HEART RATE: 99 BPM | BODY MASS INDEX: 25.66 KG/M2 | DIASTOLIC BLOOD PRESSURE: 86 MMHG | TEMPERATURE: 59 F | SYSTOLIC BLOOD PRESSURE: 132 MMHG

## 2018-05-04 VITALS
SYSTOLIC BLOOD PRESSURE: 130 MMHG | DIASTOLIC BLOOD PRESSURE: 87 MMHG | RESPIRATION RATE: 16 BRPM | HEART RATE: 95 BPM | TEMPERATURE: 98 F

## 2018-05-04 DIAGNOSIS — K20.80 RADIATION ESOPHAGITIS: Primary | ICD-10-CM

## 2018-05-04 DIAGNOSIS — Z51.11 ENCOUNTER FOR ANTINEOPLASTIC CHEMOTHERAPY: Primary | ICD-10-CM

## 2018-05-04 DIAGNOSIS — C77.1 CARCINOMA OF BREAST METASTATIC TO INTRATHORACIC LYMPH NODE, UNSPECIFIED LATERALITY: Primary | ICD-10-CM

## 2018-05-04 DIAGNOSIS — E86.0 DEHYDRATION: Primary | ICD-10-CM

## 2018-05-04 DIAGNOSIS — C50.919 CARCINOMA OF BREAST METASTATIC TO INTRATHORACIC LYMPH NODE, UNSPECIFIED LATERALITY: Primary | ICD-10-CM

## 2018-05-04 DIAGNOSIS — K20.90 ESOPHAGITIS: ICD-10-CM

## 2018-05-04 DIAGNOSIS — C50.919 CARCINOMA OF BREAST METASTATIC TO INTRATHORACIC LYMPH NODE, UNSPECIFIED LATERALITY: ICD-10-CM

## 2018-05-04 DIAGNOSIS — T66.XXXA RADIATION ESOPHAGITIS: Primary | ICD-10-CM

## 2018-05-04 DIAGNOSIS — Z17.0 MALIGNANT NEOPLASM OF CENTRAL PORTION OF LEFT BREAST IN FEMALE, ESTROGEN RECEPTOR POSITIVE: ICD-10-CM

## 2018-05-04 DIAGNOSIS — C50.112 MALIGNANT NEOPLASM OF CENTRAL PORTION OF LEFT BREAST IN FEMALE, ESTROGEN RECEPTOR POSITIVE: ICD-10-CM

## 2018-05-04 DIAGNOSIS — C77.1 CARCINOMA OF BREAST METASTATIC TO INTRATHORACIC LYMPH NODE, UNSPECIFIED LATERALITY: ICD-10-CM

## 2018-05-04 PROBLEM — R63.8 DECREASED ORAL INTAKE: Status: ACTIVE | Noted: 2018-05-04

## 2018-05-04 PROBLEM — R13.10 DYSPHAGIA: Status: ACTIVE | Noted: 2018-05-04

## 2018-05-04 LAB
INR PPP: 1
PROTHROMBIN TIME: 10.3 SEC

## 2018-05-04 PROCEDURE — S5010 5% DEXTROSE AND 0.45% SALINE: HCPCS | Performed by: STUDENT IN AN ORGANIZED HEALTH CARE EDUCATION/TRAINING PROGRAM

## 2018-05-04 PROCEDURE — 99223 1ST HOSP IP/OBS HIGH 75: CPT | Mod: ,,, | Performed by: INTERNAL MEDICINE

## 2018-05-04 PROCEDURE — 20600001 HC STEP DOWN PRIVATE ROOM

## 2018-05-04 PROCEDURE — 36415 COLL VENOUS BLD VENIPUNCTURE: CPT

## 2018-05-04 PROCEDURE — 25000003 PHARM REV CODE 250: Performed by: INTERNAL MEDICINE

## 2018-05-04 PROCEDURE — 63600175 PHARM REV CODE 636 W HCPCS: Performed by: STUDENT IN AN ORGANIZED HEALTH CARE EDUCATION/TRAINING PROGRAM

## 2018-05-04 PROCEDURE — 85610 PROTHROMBIN TIME: CPT

## 2018-05-04 PROCEDURE — 96360 HYDRATION IV INFUSION INIT: CPT

## 2018-05-04 PROCEDURE — 99999 PR PBB SHADOW E&M-EST. PATIENT-LVL III: CPT | Mod: PBBFAC,,, | Performed by: INTERNAL MEDICINE

## 2018-05-04 PROCEDURE — 25000003 PHARM REV CODE 250: Performed by: STUDENT IN AN ORGANIZED HEALTH CARE EDUCATION/TRAINING PROGRAM

## 2018-05-04 PROCEDURE — 96361 HYDRATE IV INFUSION ADD-ON: CPT

## 2018-05-04 RX ORDER — DEXTROSE MONOHYDRATE AND SODIUM CHLORIDE 5; .9 G/100ML; G/100ML
INJECTION, SOLUTION INTRAVENOUS CONTINUOUS
Status: DISCONTINUED | OUTPATIENT
Start: 2018-05-04 | End: 2018-05-04

## 2018-05-04 RX ORDER — SODIUM CHLORIDE 0.9 % (FLUSH) 0.9 %
3 SYRINGE (ML) INJECTION
Status: DISCONTINUED | OUTPATIENT
Start: 2018-05-04 | End: 2018-05-09 | Stop reason: HOSPADM

## 2018-05-04 RX ORDER — RAMELTEON 8 MG/1
8 TABLET ORAL NIGHTLY PRN
Status: DISCONTINUED | OUTPATIENT
Start: 2018-05-04 | End: 2018-05-09 | Stop reason: HOSPADM

## 2018-05-04 RX ORDER — MORPHINE SULFATE 2 MG/ML
2 INJECTION, SOLUTION INTRAMUSCULAR; INTRAVENOUS EVERY 4 HOURS PRN
Status: DISCONTINUED | OUTPATIENT
Start: 2018-05-04 | End: 2018-05-07

## 2018-05-04 RX ORDER — DEXTROSE MONOHYDRATE AND SODIUM CHLORIDE 5; .45 G/100ML; G/100ML
INJECTION, SOLUTION INTRAVENOUS CONTINUOUS
Status: ACTIVE | OUTPATIENT
Start: 2018-05-04 | End: 2018-05-05

## 2018-05-04 RX ORDER — ENOXAPARIN SODIUM 100 MG/ML
40 INJECTION SUBCUTANEOUS EVERY 24 HOURS
Status: DISCONTINUED | OUTPATIENT
Start: 2018-05-04 | End: 2018-05-09 | Stop reason: HOSPADM

## 2018-05-04 RX ADMIN — MORPHINE SULFATE 2 MG: 2 INJECTION, SOLUTION INTRAMUSCULAR; INTRAVENOUS at 10:05

## 2018-05-04 RX ADMIN — SODIUM CHLORIDE 1000 ML: 0.9 INJECTION, SOLUTION INTRAVENOUS at 04:05

## 2018-05-04 RX ADMIN — DEXTROSE AND SODIUM CHLORIDE: 5; .45 INJECTION, SOLUTION INTRAVENOUS at 07:05

## 2018-05-04 RX ADMIN — RAMELTEON 8 MG: 8 TABLET, FILM COATED ORAL at 10:05

## 2018-05-04 RX ADMIN — Medication 10 ML: at 09:05

## 2018-05-04 RX ADMIN — ENOXAPARIN SODIUM 40 MG: 100 INJECTION SUBCUTANEOUS at 09:05

## 2018-05-04 NOTE — SUBJECTIVE & OBJECTIVE
Oncology Treatment Plan:   [No treatment plan]    Medications:  Continuous Infusions:  Scheduled Meds:  PRN Meds:     Review of patient's allergies indicates:   Allergen Reactions    Penicillins Other (See Comments)     unknown    Vancomycin analogues Hives        Past Medical History:   Diagnosis Date    Cancer     breast cancer; lung cancer    PONV (postoperative nausea and vomiting)      Past Surgical History:   Procedure Laterality Date    BREAST SURGERY  2004    breast reduction    laparascopy      MASTECTOMY Bilateral 05/11/2017    TEMPOROMANDIBULAR JOINT SURGERY      TONSILLECTOMY       Family History     Problem Relation (Age of Onset)    GI problems Mother    Heart disease Father        Social History Main Topics    Smoking status: Never Smoker    Smokeless tobacco: Never Used    Alcohol use Yes      Comment: rarely    Drug use: No    Sexual activity: Yes     Partners: Male       Review of Systems   Constitutional: Positive for fatigue. Negative for activity change, appetite change, chills and fever.   HENT: Positive for sore throat and trouble swallowing. Negative for congestion and facial swelling.    Eyes: Negative for discharge, itching and visual disturbance.   Respiratory: Negative for apnea, cough, chest tightness and shortness of breath.    Cardiovascular: Negative for chest pain and palpitations.   Gastrointestinal: Negative for abdominal distention, anal bleeding, blood in stool, constipation, diarrhea and nausea.   Endocrine: Negative for cold intolerance and heat intolerance.   Genitourinary: Negative for difficulty urinating and dysuria.   Musculoskeletal: Positive for arthralgias and myalgias.   Skin: Negative for color change and pallor.   Neurological: Negative for dizziness and headaches.   Hematological: Negative for adenopathy. Does not bruise/bleed easily.   Psychiatric/Behavioral: Negative for agitation and behavioral problems.     Objective:     Vital Signs (Most  Recent):    Vital Signs (24h Range):  Temp:  [59 °F (15 °C)] 59 °F (15 °C)  Pulse:  [99] 99  BP: (132)/(86) 132/86        There is no height or weight on file to calculate BMI.  There is no height or weight on file to calculate BSA.    No intake or output data in the 24 hours ending 05/04/18 1530    Physical Exam   Constitutional: She is oriented to person, place, and time. She appears well-developed and well-nourished.   HENT:   Head: Normocephalic and atraumatic.   Eyes: Conjunctivae, EOM and lids are normal.   Neck: Phonation normal. No edema present.   Cardiovascular: Normal rate, regular rhythm and normal heart sounds.    Pulmonary/Chest: Effort normal and breath sounds normal.   Abdominal: Soft. Normal appearance and bowel sounds are normal. There is no hepatosplenomegaly.   Neurological: She is alert and oriented to person, place, and time.   Skin: Skin is warm, dry and intact.   Psychiatric: She has a normal mood and affect. Her speech is normal and behavior is normal.   Vitals reviewed.      Significant Labs:   Recent Results (from the past 24 hour(s))   Comprehensive metabolic panel    Collection Time: 05/04/18  1:53 PM   Result Value Ref Range    Sodium 137 136 - 145 mmol/L    Potassium 4.5 3.5 - 5.1 mmol/L    Chloride 103 95 - 110 mmol/L    CO2 27 23 - 29 mmol/L    Glucose 86 70 - 110 mg/dL    BUN, Bld 13 6 - 20 mg/dL    Creatinine 0.8 0.5 - 1.4 mg/dL    Calcium 9.9 8.7 - 10.5 mg/dL    Total Protein 8.3 6.0 - 8.4 g/dL    Albumin 4.0 3.5 - 5.2 g/dL    Total Bilirubin 0.9 0.1 - 1.0 mg/dL    Alkaline Phosphatase 63 55 - 135 U/L    AST 23 10 - 40 U/L    ALT 17 10 - 44 U/L    Anion Gap 7 (L) 8 - 16 mmol/L    eGFR if African American >60.0 >60 mL/min/1.73 m^2    eGFR if non African American >60.0 >60 mL/min/1.73 m^2       Diagnostic Results:

## 2018-05-04 NOTE — ASSESSMENT & PLAN NOTE
Decreased po intake x3 days  -normal renal function on todays labs, appears clinically euvolemic  -MIVF w/ D5 1/2NS @ 100 cc/hr

## 2018-05-04 NOTE — PROGRESS NOTES
Subjective:       Patient ID: Georgia Jeffrey is a 62 y.o. female.    Chief Complaint: No chief complaint on file.    HPI     Ms. Jeffrey is a 62-year-old female patient of Dr. Whatley's who had called earlier today complaining of inability to swallow for several days, and was asked to present for evaluation.    She has metastatic carcinoma of the left breast, and she just finished palliative XRT to C4 three days ago.  She is currently on afinitor and examestane, after progressing on femara and palbociclib.     For detailed oncologic history, please refer to Dr. Whatley's note of 4/23/2018 which I had the opportunity to review.     CMP today is essentially normal.    Review of Systems      Overall she feels poorly.  She states that since she completed her radiation therapy she has been unable to eat or drink anything due to intense substernal / throat pain.  She complains of generalized weakness.   She appears anxious with the recent developments, but she denies any depression, easy bruising, fevers, chills, night  sweats, nausea, vomiting, diarrhea, constipation, diplopia, blurred vision, headache, palpitations, shortness of breath, breast pain, abdominal pain, extremity pain, or difficulty ambulating.  The remainder of the ten-point ROS, including general, skin, lymph, H/N, cardiorespiratory, GI, , Neuro, Endocrine, and psychiatric is negative.     Objective:      Physical Exam        She is alert, oriented to time, place, person, pleasant but anxious, well      nourished, in mild physical distress complaining of throat pain and inability to swallow..                                    VITAL SIGNS:  Reviewed                                      HEENT:  Normal.  There are no nasal, oral, lip, gingival, auricular, lid,    or conjunctival lesions.  Mucosae are moist and pink, and there is no        thrush.  Pupils are equal, reactive to light and accommodation.              Extraocular muscle movements are  intact.  Dentition is good.                                      NECK:  Supple without JVD, adenopathy, or thyromegaly.                       LUNGS:  Clear to auscultation without wheezing, rales, or rhonchi.           CARDIOVASCULAR:  Reveals an S1, S2, no murmurs, no rubs, no gallops.         ABDOMEN:  Soft, nontender, without organomegaly.  Bowel sounds are    present.                                                                     EXTREMITIES:  No cyanosis, clubbing, or edema.                               BREASTS:  She is status post bilateral mastectomies/ EHr incisions have healed nicely and there is no evidence of a chest wall recurrence.                                    LYMPHATIC:  There is no cervical, axillary, or supraclavicular adenopathy.   SKIN:  Warm and moist, without petechiae, rashes, induration, or ecchymoses.           NEUROLOGIC:  DTRs are 0-1+ bilaterally, symmetrical, motor function is 5/5,  and cranial nerves are  within normal limits.    Assessment:       1. Carcinoma of breast metastatic to intrathoracic lymph node, unspecified laterality    2. Malignant neoplasm of central portion of left breast in female, estrogen receptor positive      3.    Radiation esophagitis.  4.      Dehydration, poor oral intake secondary to radiation esophagitis. .  5.      Bone metastases.  Plan:        I had a long talk with her; I recommended she be admitted for rehydration since she is not able to tolerate solids or even liquids po.  She was agreeable.  Report was given to the inpatient team.  Her multiple questions were answered to her satisfaction.

## 2018-05-04 NOTE — ASSESSMENT & PLAN NOTE
62 y.o. F w/ metastatic breast cancer who is s/p palliative XRT to C4 three days ago presents with dysphagia and odynophagia  -Dukes solution PRN   -IV morphine 2mg q4h PRN for pain   -MIVF

## 2018-05-04 NOTE — H&P
[facility]  Hematology/Oncology  H&P    Patient Name: Georgia Jeffrey  MRN: [id number]  Admission Date: (Not on file)  Code Status: Full Code   Attending Provider: Hero Mcknight MD  Primary Care Physician: Primary Doctor No  Principal Problem: Dysphagia    Subjective:     HPI: Georgia Jeffrey is a 62 y.o. F patient of Dr. Whatley's with metastatic carcinoma of the left breast (currently on afinitor and examestane, after progressing on femara and palbociclib), s/p palliative XRT to C4 three days ago, who called earlier today complaining of inability to swallow for several days, and was asked to present for evaluation. Reports about      Oncologic History:  She is currently on Faslodex and palbociclib.  In addition she is on Zometa for all metastasis.     She is receiving radiation to her T 2 metastasis with Dr. Witt on the [facility]. She has had 4 treatments thus far.  She will have 10-15 treatments.  She reports her major side effect is been some severe fatigue.  She's remained capable of doing all of her normal activities but has to struggle with that.  She has some slight scratchiness in her throat which she relates to the radiation.  Her bowels have been variable.  She has no shortness of breath.       Breast  History: She developed palpable abnormality in her left breas in the early part of 2017.    Diagnostic mammography from April 18, 2017 showed an irregular mass with pleomorphic calcifications at the 9:00 area of the left breast.  By ultrasound this was solid mass measuring 36 mm.     Breast biopsy on April 19, 2017 showed infiltrating ductal carcinoma (histologic grade 3, nuclear grade 2, mitotic index 3) tumor was 99% ER positive, 91% HI positive.  HER-2 was negative by FISH.     On May 11, 2017 she underwent bilateral nipple sparing mastectomies by Dr.Karl Gutierrez and autologous breast reconstruction by  at [facility].  She developed  some compromise of her left flap which was removed and an implant was placed.  That became infected and was removed.  Several weeks ago she underwent removal of residual reconstruction from both sides.     The pathology from that surgery showed a 3 cm high grade grading ductal carcinoma (3+3+2).  There is associated lymphovascular invasion.  In addition was associated high-grade DCIS.  [place] lymph node was negative.    The right breast showed no evidence of any abnormality.  Final pathological stage TII N0 stage IIA.  Oncotype was 31.     She was seen by medical oncology and chemotherapy was discussed.      PET/CT scan was performed on June 27 which showed increased uptake in the subcarinal node measuring 9 mm an SUV of 5.9, there was increased uptake in the right hilum with an SUV of 5.9, there were multiple pulmonary nodules on the right side a right middle lobe nodule measured 16 mm with an SUV of 6.8 right lower lobe nodule 9 mm with an SUV of 2.6.  In addition there were multiple other subcentimeter nodules.  Increased uptake was seen in the right intertrochanteric femur with an SUV of 4.  (Subsequent MRI of the right femur on July 14 did not show any bony abnormality).     On July 14 she underwent bronchoscopy and endoscopic ultrasound.  Left lower lobe brush sample was positive for carcinoma and a fine-needle aspirate from a station 7 lymph node was also positive for carcinoma.  These were estrogen receptor positive.     In July 2017 she was started on systemic therapy with Faslodex and palbociclib.  In August 2017 she developed a DVT of the left lower extremity with the peroneal and posterior tibial veins exhibiting thrombosis.  She was started on apixaban at that time.  Follow-up PET scan in September  suggested bone metastasis.     Due to insurance  issues she was changed to letrozole in October 2017 and continued her palbociclib. She had significant nausea and vomiting secondary to letrozole and that  was discontinued.     On December 1, 2017 she underwent MRI scanning of the thoracic and lumbar spine.  That showed a metastasis at the left T2 lamina.  The lumbar spine was negative for malignancy Subsequent MRI scans on December 4 were negative in the cervical spine and brain were all negative for malignancy.  Chest x-ray in December 4 was clear.     She was off all therapy for about 6 weeks then restarted  faslodex and palbociclib on  12/17/17.     Oncology Treatment Plan:   [No treatment plan]    Medications:  Continuous Infusions:  Scheduled Meds:  PRN Meds:     Review of patient's allergies indicates:   Allergen Reactions    Penicillins Other (See Comments)     unknown    Vancomycin analogues Hives        Past Medical History:   Diagnosis Date    Cancer     breast cancer; lung cancer    PONV (postoperative nausea and vomiting)      Past Surgical History:   Procedure Laterality Date    BREAST SURGERY  2004    breast reduction    laparascopy      MASTECTOMY Bilateral 05/11/2017    TEMPOROMANDIBULAR JOINT SURGERY      TONSILLECTOMY       Family History     Problem Relation (Age of Onset)    GI problems Mother    Heart disease Father        Social History Main Topics    Smoking status: Never Smoker    Smokeless tobacco: Never Used    Alcohol use Yes      Comment: rarely    Drug use: No    Sexual activity: Yes     Partners: Male       Review of Systems   Constitutional: Positive for fatigue. Negative for activity change, appetite change, chills and fever.   HENT: Positive for sore throat and trouble swallowing. Negative for congestion and facial swelling.    Eyes: Negative for discharge, itching and visual disturbance.   Respiratory: Negative for apnea, cough, chest tightness and shortness of breath.    Cardiovascular: Negative for chest pain and palpitations.   Gastrointestinal: Negative for abdominal distention, anal bleeding, blood in stool, constipation, diarrhea and nausea.   Endocrine: Negative  for cold intolerance and heat intolerance.   Genitourinary: Negative for difficulty urinating and dysuria.   Musculoskeletal: Positive for arthralgias and myalgias.   Skin: Negative for color change and pallor.   Neurological: Negative for dizziness and headaches.   Hematological: Negative for adenopathy. Does not bruise/bleed easily.   Psychiatric/Behavioral: Negative for agitation and behavioral problems.     Objective:     Vital Signs (Most Recent):    Vital Signs (24h Range):  Temp:  [59 °F (15 °C)] 59 °F (15 °C)  Pulse:  [99] 99  BP: (132)/(86) 132/86        There is no height or weight on file to calculate BMI.  There is no height or weight on file to calculate BSA.    No intake or output data in the 24 hours ending 05/04/18 1530    Physical Exam   Constitutional: She is oriented to person, place, and time. She appears well-developed and well-nourished.   HENT:   Head: Normocephalic and atraumatic.   Eyes: Conjunctivae, EOM and lids are normal.   Neck: Phonation normal. No edema present.   Cardiovascular: Normal rate, regular rhythm and normal heart sounds.    Pulmonary/Chest: Effort normal and breath sounds normal.   Abdominal: Soft. Normal appearance and bowel sounds are normal. There is no hepatosplenomegaly.   Neurological: She is alert and oriented to person, place, and time.   Skin: Skin is warm, dry and intact.   Psychiatric: She has a normal mood and affect. Her speech is normal and behavior is normal.   Vitals reviewed.      Significant Labs:   Recent Results (from the past 24 hour(s))   Comprehensive metabolic panel    Collection Time: 05/04/18  1:53 PM   Result Value Ref Range    Sodium 137 136 - 145 mmol/L    Potassium 4.5 3.5 - 5.1 mmol/L    Chloride 103 95 - 110 mmol/L    CO2 27 23 - 29 mmol/L    Glucose 86 70 - 110 mg/dL    BUN, Bld 13 6 - 20 mg/dL    Creatinine 0.8 0.5 - 1.4 mg/dL    Calcium 9.9 8.7 - 10.5 mg/dL    Total Protein 8.3 6.0 - 8.4 g/dL    Albumin 4.0 3.5 - 5.2 g/dL    Total  Bilirubin 0.9 0.1 - 1.0 mg/dL    Alkaline Phosphatase 63 55 - 135 U/L    AST 23 10 - 40 U/L    ALT 17 10 - 44 U/L    Anion Gap 7 (L) 8 - 16 mmol/L    eGFR if African American >60.0 >60 mL/min/1.73 m^2    eGFR if non African American >60.0 >60 mL/min/1.73 m^2       Diagnostic Results:  NM PET CT 04/23/2018:  FINDINGS:  Quality of the study: Adequate.    In this patient with a history of breast cancer with known osseous metastatic disease, index lesions are as follows:    - T2 left lamina with SUV max of 7.0, compared to 7.0 on prior exam.    - right middle lobe opacity with an SUV max of 1.9, compared to 1.7 on prior exam.    There has been interval development of a new focal area of increased FDG uptake in the left sacral ala with a maximum SUV of 6.3 as well as multiple hypermetabolic lymph nodes in the left supraclavicular region, left axilla, and para-aortic/retroperitoneum.  For reference, new left axillary lymph node with a maximum SUV of 4.2.    Physiologic uptake of the tracer is present within the brain, salivary glands, myocardium, GI and  tracts.    Incidental CT findings: Multiple subcentimeter pulmonary nodules that are below the threshold for FDG uptake.  Small hiatal hernia.      Assessment/Plan:     Decreased oral intake    Decreased po intake x3 days  -normal renal function on todays labs, appears clinically euvolemic  -MIVF w/ D5 1/2NS @ 100 cc/hr       Dysphagia    62 y.o. F w/ metastatic breast cancer who is s/p palliative XRT to C4 three days ago presents with dysphagia and odynophagia  -Dukes solution PRN   -IV morphine 2mg q4h PRN for pain   -MIVF      Carcinoma of breast metastatic to intrathoracic lymph node    metastatic carcinoma of the left breast   -currently on afinitor and examestane, after progressing on femara and palbociclib  -s/p palliative XRT to C4 three days ago          Ry Luther MD  Hematology/Oncology, PGY-1  [facility] have reviewed the  notes, assessments, and/or procedures performed by the housestaff, as above.  I have personally interviewed and examined the patient at the beside, and rounded with the housestaff. I concur with her/his assessment and plan and the documentation of Georgia Jeffrey.  I, Dr. Hero Mcknight, personally spent more than  80 mins during this encounter, greater than 50% was spent in direct counseling and/or coordination of care.     Hero Mcknight M.D., M.S., F.A.C.P.  Hematology/Oncology Attending  [facility]

## 2018-05-04 NOTE — ASSESSMENT & PLAN NOTE
metastatic carcinoma of the left breast   -currently on afinitor and examestane, after progressing on femara and palbociclib  -s/p palliative XRT to C4 three days ago

## 2018-05-04 NOTE — HPI
Georgia Jeffrey is a 62 y.o. F patient of Dr. Whatley's with metastatic carcinoma of the left breast (currently on afinitor and examestane, after progressing on femara and palbociclib), s/p palliative XRT to C4 three days ago, who called earlier today complaining of inability to swallow for several days, and was asked to present for evaluation. Reports about      Oncologic History:  She is currently on Faslodex and palbociclib.  In addition she is on Zometa for all metastasis.     She is receiving radiation to her T 2 metastasis with Dr. Witt on the [facility]. She has had 4 treatments thus far.  She will have 10-15 treatments.  She reports her major side effect is been some severe fatigue.  She's remained capable of doing all of her normal activities but has to struggle with that.  She has some slight scratchiness in her throat which she relates to the radiation.  Her bowels have been variable.  She has no shortness of breath.       Breast  History: She developed palpable abnormality in her left breas in the early part of 2017.    Diagnostic mammography from April 18, 2017 showed an irregular mass with pleomorphic calcifications at the 9:00 area of the left breast.  By ultrasound this was solid mass measuring 36 mm.     Breast biopsy on April 19, 2017 showed infiltrating ductal carcinoma (histologic grade 3, nuclear grade 2, mitotic index 3) tumor was 99% ER positive, 91% PA positive.  HER-2 was negative by FISH.     On May 11, 2017 she underwent bilateral nipple sparing mastectomies by Dr.Karl Gutierrez and autologous breast reconstruction by  at [facility].  She developed some compromise of her left flap which was removed and an implant was placed.  That became infected and was removed.  Several weeks ago she underwent removal of residual reconstruction from both sides.     The pathology from that surgery showed a 3 cm high grade grading ductal carcinoma (3+3+2).  There  is associated lymphovascular invasion.  In addition was associated high-grade DCIS.  [place] lymph node was negative.    The right breast showed no evidence of any abnormality.  Final pathological stage TII N0 stage IIA.  Oncotype was 31.     She was seen by medical oncology and chemotherapy was discussed.      PET/CT scan was performed on June 27 which showed increased uptake in the subcarinal node measuring 9 mm an SUV of 5.9, there was increased uptake in the right hilum with an SUV of 5.9, there were multiple pulmonary nodules on the right side a right middle lobe nodule measured 16 mm with an SUV of 6.8 right lower lobe nodule 9 mm with an SUV of 2.6.  In addition there were multiple other subcentimeter nodules.  Increased uptake was seen in the right intertrochanteric femur with an SUV of 4.  (Subsequent MRI of the right femur on July 14 did not show any bony abnormality).     On July 14 she underwent bronchoscopy and endoscopic ultrasound.  Left lower lobe brush sample was positive for carcinoma and a fine-needle aspirate from a station 7 lymph node was also positive for carcinoma.  These were estrogen receptor positive.     In July 2017 she was started on systemic therapy with Faslodex and palbociclib.  In August 2017 she developed a DVT of the left lower extremity with the peroneal and posterior tibial veins exhibiting thrombosis.  She was started on apixaban at that time.  Follow-up PET scan in September  suggested bone metastasis.     Due to insurance  issues she was changed to letrozole in October 2017 and continued her palbociclib. She had significant nausea and vomiting secondary to letrozole and that was discontinued.     On December 1, 2017 she underwent MRI scanning of the thoracic and lumbar spine.  That showed a metastasis at the left T2 lamina.  The lumbar spine was negative for malignancy Subsequent MRI scans on December 4 were negative in the cervical spine and brain were all negative for  malignancy.  Chest x-ray in December 4 was clear.     She was off all therapy for about 6 weeks then restarted  faslodex and palbociclib on  12/17/17.

## 2018-05-05 LAB
ANION GAP SERPL CALC-SCNC: 7 MMOL/L
BASOPHILS # BLD AUTO: 0.03 K/UL
BASOPHILS NFR BLD: 0.7 %
BUN SERPL-MCNC: 10 MG/DL
CALCIUM SERPL-MCNC: 8.6 MG/DL
CHLORIDE SERPL-SCNC: 106 MMOL/L
CO2 SERPL-SCNC: 22 MMOL/L
CREAT SERPL-MCNC: 0.6 MG/DL
DIFFERENTIAL METHOD: ABNORMAL
EOSINOPHIL # BLD AUTO: 0.2 K/UL
EOSINOPHIL NFR BLD: 5.1 %
ERYTHROCYTE [DISTWIDTH] IN BLOOD BY AUTOMATED COUNT: 15.6 %
EST. GFR  (AFRICAN AMERICAN): >60 ML/MIN/1.73 M^2
EST. GFR  (NON AFRICAN AMERICAN): >60 ML/MIN/1.73 M^2
GLUCOSE SERPL-MCNC: 100 MG/DL
HCT VFR BLD AUTO: 33.3 %
HGB BLD-MCNC: 11.4 G/DL
IMM GRANULOCYTES # BLD AUTO: 0.01 K/UL
IMM GRANULOCYTES NFR BLD AUTO: 0.2 %
LYMPHOCYTES # BLD AUTO: 1.2 K/UL
LYMPHOCYTES NFR BLD: 30 %
MAGNESIUM SERPL-MCNC: 2 MG/DL
MCH RBC QN AUTO: 39.7 PG
MCHC RBC AUTO-ENTMCNC: 34.2 G/DL
MCV RBC AUTO: 116 FL
MONOCYTES # BLD AUTO: 0.6 K/UL
MONOCYTES NFR BLD: 15.4 %
NEUTROPHILS # BLD AUTO: 2 K/UL
NEUTROPHILS NFR BLD: 48.6 %
NRBC BLD-RTO: 0 /100 WBC
PHOSPHATE SERPL-MCNC: 2.2 MG/DL
PLATELET # BLD AUTO: 204 K/UL
PMV BLD AUTO: 10.4 FL
POTASSIUM SERPL-SCNC: 3.9 MMOL/L
RBC # BLD AUTO: 2.87 M/UL
SODIUM SERPL-SCNC: 135 MMOL/L
WBC # BLD AUTO: 4.1 K/UL

## 2018-05-05 PROCEDURE — C1751 CATH, INF, PER/CENT/MIDLINE: HCPCS

## 2018-05-05 PROCEDURE — 84100 ASSAY OF PHOSPHORUS: CPT

## 2018-05-05 PROCEDURE — 80048 BASIC METABOLIC PNL TOTAL CA: CPT

## 2018-05-05 PROCEDURE — 36415 COLL VENOUS BLD VENIPUNCTURE: CPT

## 2018-05-05 PROCEDURE — 20600001 HC STEP DOWN PRIVATE ROOM

## 2018-05-05 PROCEDURE — 99233 SBSQ HOSP IP/OBS HIGH 50: CPT | Mod: ,,, | Performed by: INTERNAL MEDICINE

## 2018-05-05 PROCEDURE — 83735 ASSAY OF MAGNESIUM: CPT

## 2018-05-05 PROCEDURE — S5010 5% DEXTROSE AND 0.45% SALINE: HCPCS | Performed by: HOSPITALIST

## 2018-05-05 PROCEDURE — 36569 INSJ PICC 5 YR+ W/O IMAGING: CPT

## 2018-05-05 PROCEDURE — 25000003 PHARM REV CODE 250: Performed by: HOSPITALIST

## 2018-05-05 PROCEDURE — 85025 COMPLETE CBC W/AUTO DIFF WBC: CPT

## 2018-05-05 PROCEDURE — 76937 US GUIDE VASCULAR ACCESS: CPT

## 2018-05-05 PROCEDURE — 63600175 PHARM REV CODE 636 W HCPCS: Performed by: STUDENT IN AN ORGANIZED HEALTH CARE EDUCATION/TRAINING PROGRAM

## 2018-05-05 RX ORDER — DEXTROSE MONOHYDRATE AND SODIUM CHLORIDE 5; .45 G/100ML; G/100ML
INJECTION, SOLUTION INTRAVENOUS CONTINUOUS
Status: ACTIVE | OUTPATIENT
Start: 2018-05-05 | End: 2018-05-06

## 2018-05-05 RX ADMIN — MORPHINE SULFATE 2 MG: 2 INJECTION, SOLUTION INTRAMUSCULAR; INTRAVENOUS at 12:05

## 2018-05-05 RX ADMIN — MORPHINE SULFATE 2 MG: 2 INJECTION, SOLUTION INTRAMUSCULAR; INTRAVENOUS at 07:05

## 2018-05-05 RX ADMIN — ENOXAPARIN SODIUM 40 MG: 100 INJECTION SUBCUTANEOUS at 04:05

## 2018-05-05 RX ADMIN — DEXTROSE AND SODIUM CHLORIDE: 5; .45 INJECTION, SOLUTION INTRAVENOUS at 08:05

## 2018-05-05 NOTE — PROGRESS NOTES
[facility]  Hematology/Oncology  Progress Note    Patient Name: Georgia Jeffrey  Admission Date: 5/4/2018  Hospital Length of Stay: 1 days  Code Status: Full Code     Subjective:     HPI:  Georgia Jeffrey is a 62 y.o. F patient of Dr. Whatley's with metastatic carcinoma of the left breast (currently on afinitor and examestane, after progressing on femara and palbociclib), s/p palliative XRT to C4 three days ago, who called earlier today complaining of inability to swallow for several days, and was asked to present for evaluation. Reports about      Oncologic History:  She is currently on Faslodex and palbociclib.  In addition she is on Zometa for all metastasis.     She is receiving radiation to her T 2 metastasis with Dr. Witt on the [facility]. She has had 4 treatments thus far.  She will have 10-15 treatments.  She reports her major side effect is been some severe fatigue.  She's remained capable of doing all of her normal activities but has to struggle with that.  She has some slight scratchiness in her throat which she relates to the radiation.  Her bowels have been variable.  She has no shortness of breath.       Breast  History: She developed palpable abnormality in her left breas in the early part of 2017.    Diagnostic mammography from April 18, 2017 showed an irregular mass with pleomorphic calcifications at the 9:00 area of the left breast.  By ultrasound this was solid mass measuring 36 mm.     Breast biopsy on April 19, 2017 showed infiltrating ductal carcinoma (histologic grade 3, nuclear grade 2, mitotic index 3) tumor was 99% ER positive, 91% LA positive.  HER-2 was negative by FISH.     On May 11, 2017 she underwent bilateral nipple sparing mastectomies by Dr.Karl Gutierrez and autologous breast reconstruction by  at [facility].  She developed some compromise of her left flap which was removed and an implant was placed.  That became  infected and was removed.  Several weeks ago she underwent removal of residual reconstruction from both sides.     The pathology from that surgery showed a 3 cm high grade grading ductal carcinoma (3+3+2).  There is associated lymphovascular invasion.  In addition was associated high-grade DCIS.  [place] lymph node was negative.    The right breast showed no evidence of any abnormality.  Final pathological stage TII N0 stage IIA.  Oncotype was 31.     She was seen by medical oncology and chemotherapy was discussed.      PET/CT scan was performed on June 27 which showed increased uptake in the subcarinal node measuring 9 mm an SUV of 5.9, there was increased uptake in the right hilum with an SUV of 5.9, there were multiple pulmonary nodules on the right side a right middle lobe nodule measured 16 mm with an SUV of 6.8 right lower lobe nodule 9 mm with an SUV of 2.6.  In addition there were multiple other subcentimeter nodules.  Increased uptake was seen in the right intertrochanteric femur with an SUV of 4.  (Subsequent MRI of the right femur on July 14 did not show any bony abnormality).     On July 14 she underwent bronchoscopy and endoscopic ultrasound.  Left lower lobe brush sample was positive for carcinoma and a fine-needle aspirate from a station 7 lymph node was also positive for carcinoma.  These were estrogen receptor positive.     In July 2017 she was started on systemic therapy with Faslodex and palbociclib.  In August 2017 she developed a DVT of the left lower extremity with the peroneal and posterior tibial veins exhibiting thrombosis.  She was started on apixaban at that time.  Follow-up PET scan in September  suggested bone metastasis.     Due to insurance  issues she was changed to letrozole in October 2017 and continued her palbociclib. She had significant nausea and vomiting secondary to letrozole and that was discontinued.     On December 1, 2017 she underwent MRI scanning of the thoracic and  lumbar spine.  That showed a metastasis at the left T2 lamina.  The lumbar spine was negative for malignancy Subsequent MRI scans on December 4 were negative in the cervical spine and brain were all negative for malignancy.  Chest x-ray in December 4 was clear.     She was off all therapy for about 6 weeks then restarted  faslodex and palbociclib on  12/17/17.    Interval History: NAEON. Await clinical improvement in ability to swallow, not improved thus far, patient is otherwise comfortable and w/o complaint     Oncology Treatment Plan:   [No treatment plan]    Medications:  Continuous Infusions:   dextrose 5 % and 0.45 % NaCl 100 mL/hr at 05/04/18 1944     Scheduled Meds:   duke's soln (benadryl 30 mL, mylanta 30 mL, lidocane 30 mL, nystatin 30 mL) 120 mL  10 mL Oral QID    enoxaparin  40 mg Subcutaneous Daily     PRN Meds:morphine, ramelteon, sodium chloride 0.9%     Review of Systems   Constitutional: Positive for fatigue. Negative for activity change, appetite change, chills and fever.   HENT: Positive for sore throat and trouble swallowing. Negative for congestion and facial swelling.    Eyes: Negative for discharge, itching and visual disturbance.   Respiratory: Negative for apnea, cough, chest tightness and shortness of breath.    Cardiovascular: Negative for chest pain and palpitations.   Gastrointestinal: Negative for abdominal distention, anal bleeding, blood in stool, constipation, diarrhea and nausea.   Endocrine: Negative for cold intolerance and heat intolerance.   Genitourinary: Negative for difficulty urinating and dysuria.   Musculoskeletal: Positive for arthralgias and myalgias.   Skin: Negative for color change and pallor.   Neurological: Negative for dizziness and headaches.   Hematological: Negative for adenopathy. Does not bruise/bleed easily.   Psychiatric/Behavioral: Negative for agitation and behavioral problems.     Objective:     Vital Signs (Most Recent):  Temp: 98.4 °F (36.9 °C)  (05/05/18 0438)  Pulse: 78 (05/05/18 0438)  Resp: 20 (05/05/18 0438)  BP: 119/73 (05/05/18 0438)  SpO2: 97 % (05/05/18 0438) Vital Signs (24h Range):  Temp:  [59 °F (15 °C)-98.4 °F (36.9 °C)] 98.4 °F (36.9 °C)  Pulse:  [78-99] 78  Resp:  [16-20] 20  SpO2:  [93 %-97 %] 97 %  BP: (119-132)/(73-87) 119/73     Weight: 68.8 kg (151 lb 9.1 oz)  Body mass index is 26.02 kg/m².  Body surface area is 1.76 meters squared.      Intake/Output Summary (Last 24 hours) at 05/05/18 0709  Last data filed at 05/05/18 0440   Gross per 24 hour   Intake           938.33 ml   Output                0 ml   Net           938.33 ml     Physical Exam   Constitutional: She is oriented to person, place, and time. She appears well-developed and well-nourished.   HENT:   Head: Normocephalic and atraumatic.   Eyes: Conjunctivae, EOM and lids are normal.   Neck: Phonation normal. No edema present.   Cardiovascular: Normal rate, regular rhythm and normal heart sounds.    Pulmonary/Chest: Effort normal and breath sounds normal.   Abdominal: Soft. Normal appearance and bowel sounds are normal. There is no hepatosplenomegaly.   Neurological: She is alert and oriented to person, place, and time.   Skin: Skin is warm, dry and intact.   Psychiatric: She has a normal mood and affect. Her speech is normal and behavior is normal.   Vitals reviewed.    Significant Labs:   Recent Results (from the past 24 hour(s))   Comprehensive metabolic panel    Collection Time: 05/04/18  1:53 PM   Result Value Ref Range    Sodium 137 136 - 145 mmol/L    Potassium 4.5 3.5 - 5.1 mmol/L    Chloride 103 95 - 110 mmol/L    CO2 27 23 - 29 mmol/L    Glucose 86 70 - 110 mg/dL    BUN, Bld 13 6 - 20 mg/dL    Creatinine 0.8 0.5 - 1.4 mg/dL    Calcium 9.9 8.7 - 10.5 mg/dL    Total Protein 8.3 6.0 - 8.4 g/dL    Albumin 4.0 3.5 - 5.2 g/dL    Total Bilirubin 0.9 0.1 - 1.0 mg/dL    Alkaline Phosphatase 63 55 - 135 U/L    AST 23 10 - 40 U/L    ALT 17 10 - 44 U/L    Anion Gap 7 (L) 8 - 16  mmol/L    eGFR if African American >60.0 >60 mL/min/1.73 m^2    eGFR if non African American >60.0 >60 mL/min/1.73 m^2   Protime-INR    Collection Time: 05/04/18  8:21 PM   Result Value Ref Range    Prothrombin Time 10.3 9.0 - 12.5 sec    INR 1.0 0.8 - 1.2   Basic metabolic panel    Collection Time: 05/05/18  4:46 AM   Result Value Ref Range    Sodium 135 (L) 136 - 145 mmol/L    Potassium 3.9 3.5 - 5.1 mmol/L    Chloride 106 95 - 110 mmol/L    CO2 22 (L) 23 - 29 mmol/L    Glucose 100 70 - 110 mg/dL    BUN, Bld 10 6 - 20 mg/dL    Creatinine 0.6 0.5 - 1.4 mg/dL    Calcium 8.6 (L) 8.7 - 10.5 mg/dL    Anion Gap 7 (L) 8 - 16 mmol/L    eGFR if African American >60.0 >60 mL/min/1.73 m^2    eGFR if non African American >60.0 >60 mL/min/1.73 m^2   Phosphorus    Collection Time: 05/05/18  4:46 AM   Result Value Ref Range    Phosphorus 2.2 (L) 2.7 - 4.5 mg/dL   CBC auto differential    Collection Time: 05/05/18  4:46 AM   Result Value Ref Range    WBC 4.10 3.90 - 12.70 K/uL    RBC 2.87 (L) 4.00 - 5.40 M/uL    Hemoglobin 11.4 (L) 12.0 - 16.0 g/dL    Hematocrit 33.3 (L) 37.0 - 48.5 %     (H) 82 - 98 fL    MCH 39.7 (H) 27.0 - 31.0 pg    MCHC 34.2 32.0 - 36.0 g/dL    RDW 15.6 (H) 11.5 - 14.5 %    Platelets 204 150 - 350 K/uL    MPV 10.4 9.2 - 12.9 fL    Immature Granulocytes 0.2 0.0 - 0.5 %    Gran # (ANC) 2.0 1.8 - 7.7 K/uL    Immature Grans (Abs) 0.01 0.00 - 0.04 K/uL    Lymph # 1.2 1.0 - 4.8 K/uL    Mono # 0.6 0.3 - 1.0 K/uL    Eos # 0.2 0.0 - 0.5 K/uL    Baso # 0.03 0.00 - 0.20 K/uL    nRBC 0 0 /100 WBC    Gran% 48.6 38.0 - 73.0 %    Lymph% 30.0 18.0 - 48.0 %    Mono% 15.4 (H) 4.0 - 15.0 %    Eosinophil% 5.1 0.0 - 8.0 %    Basophil% 0.7 0.0 - 1.9 %    Differential Method Automated    Magnesium    Collection Time: 05/05/18  4:46 AM   Result Value Ref Range    Magnesium 2.0 1.6 - 2.6 mg/dL     Diagnostic Results:  NM PET CT 04/23/2018:  FINDINGS:  Quality of the study: Adequate.    In this patient with a history of breast  cancer with known osseous metastatic disease, index lesions are as follows:  - T2 left lamina with SUV max of 7.0, compared to 7.0 on prior exam.  - right middle lobe opacity with an SUV max of 1.9, compared to 1.7 on prior exam.    There has been interval development of a new focal area of increased FDG uptake in the left sacral ala with a maximum SUV of 6.3 as well as multiple hypermetabolic lymph nodes in the left supraclavicular region, left axilla, and para-aortic/retroperitoneum.  For reference, new left axillary lymph node with a maximum SUV of 4.2.    Physiologic uptake of the tracer is present within the brain, salivary glands, myocardium, GI and  tracts.    Incidental CT findings: Multiple subcentimeter pulmonary nodules that are below the threshold for FDG uptake.  Small hiatal hernia.    Assessment/Plan:     Decreased oral intake    Decreased po intake x3 days  -normal renal function on todays labs, appears clinically euvolemic  -MIVF w/ D5 1/2NS @ 100 cc/hr       Radiation esophagitis    62 y.o. 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  I have personally interviewed and examined the patient at the beside, and rounded with the housestaff. I concur with her/his assessment and plan and the documentation of Georgia Jeffrey.  I, Dr. Hero Mcknight, personally  spent more than  35 mins during this encounter, greater than 50% was spent in direct counseling and/or coordination of care.     Hero Mcknight M.D., M.S., F.A.C.P.  Hematology/Oncology Attending  [facility]

## 2018-05-05 NOTE — SUBJECTIVE & OBJECTIVE
Interval History: NAEON. Await clinical improvement in ability to swallow    Oncology Treatment Plan:   [No treatment plan]    Medications:  Continuous Infusions:   dextrose 5 % and 0.45 % NaCl 100 mL/hr at 05/04/18 1944     Scheduled Meds:   duke's soln (benadryl 30 mL, mylanta 30 mL, lidocane 30 mL, nystatin 30 mL) 120 mL  10 mL Oral QID    enoxaparin  40 mg Subcutaneous Daily     PRN Meds:morphine, ramelteon, sodium chloride 0.9%     Review of Systems   Constitutional: Positive for fatigue. Negative for activity change, appetite change, chills and fever.   HENT: Positive for sore throat and trouble swallowing. Negative for congestion and facial swelling.    Eyes: Negative for discharge, itching and visual disturbance.   Respiratory: Negative for apnea, cough, chest tightness and shortness of breath.    Cardiovascular: Negative for chest pain and palpitations.   Gastrointestinal: Negative for abdominal distention, anal bleeding, blood in stool, constipation, diarrhea and nausea.   Endocrine: Negative for cold intolerance and heat intolerance.   Genitourinary: Negative for difficulty urinating and dysuria.   Musculoskeletal: Positive for arthralgias and myalgias.   Skin: Negative for color change and pallor.   Neurological: Negative for dizziness and headaches.   Hematological: Negative for adenopathy. Does not bruise/bleed easily.   Psychiatric/Behavioral: Negative for agitation and behavioral problems.     Objective:     Vital Signs (Most Recent):  Temp: 98.4 °F (36.9 °C) (05/05/18 0438)  Pulse: 78 (05/05/18 0438)  Resp: 20 (05/05/18 0438)  BP: 119/73 (05/05/18 0438)  SpO2: 97 % (05/05/18 0438) Vital Signs (24h Range):  Temp:  [59 °F (15 °C)-98.4 °F (36.9 °C)] 98.4 °F (36.9 °C)  Pulse:  [78-99] 78  Resp:  [16-20] 20  SpO2:  [93 %-97 %] 97 %  BP: (119-132)/(73-87) 119/73     Weight: 68.8 kg (151 lb 9.1 oz)  Body mass index is 26.02 kg/m².  Body surface area is 1.76 meters squared.      Intake/Output Summary  (Last 24 hours) at 05/05/18 0709  Last data filed at 05/05/18 0440   Gross per 24 hour   Intake           938.33 ml   Output                0 ml   Net           938.33 ml       Physical Exam   Constitutional: She is oriented to person, place, and time. She appears well-developed and well-nourished.   HENT:   Head: Normocephalic and atraumatic.   Eyes: Conjunctivae, EOM and lids are normal.   Neck: Phonation normal. No edema present.   Cardiovascular: Normal rate, regular rhythm and normal heart sounds.    Pulmonary/Chest: Effort normal and breath sounds normal.   Abdominal: Soft. Normal appearance and bowel sounds are normal. There is no hepatosplenomegaly.   Neurological: She is alert and oriented to person, place, and time.   Skin: Skin is warm, dry and intact.   Psychiatric: She has a normal mood and affect. Her speech is normal and behavior is normal.   Vitals reviewed.      Significant Labs:   Recent Results (from the past 24 hour(s))   Comprehensive metabolic panel    Collection Time: 05/04/18  1:53 PM   Result Value Ref Range    Sodium 137 136 - 145 mmol/L    Potassium 4.5 3.5 - 5.1 mmol/L    Chloride 103 95 - 110 mmol/L    CO2 27 23 - 29 mmol/L    Glucose 86 70 - 110 mg/dL    BUN, Bld 13 6 - 20 mg/dL    Creatinine 0.8 0.5 - 1.4 mg/dL    Calcium 9.9 8.7 - 10.5 mg/dL    Total Protein 8.3 6.0 - 8.4 g/dL    Albumin 4.0 3.5 - 5.2 g/dL    Total Bilirubin 0.9 0.1 - 1.0 mg/dL    Alkaline Phosphatase 63 55 - 135 U/L    AST 23 10 - 40 U/L    ALT 17 10 - 44 U/L    Anion Gap 7 (L) 8 - 16 mmol/L    eGFR if African American >60.0 >60 mL/min/1.73 m^2    eGFR if non African American >60.0 >60 mL/min/1.73 m^2   Protime-INR    Collection Time: 05/04/18  8:21 PM   Result Value Ref Range    Prothrombin Time 10.3 9.0 - 12.5 sec    INR 1.0 0.8 - 1.2   Basic metabolic panel    Collection Time: 05/05/18  4:46 AM   Result Value Ref Range    Sodium 135 (L) 136 - 145 mmol/L    Potassium 3.9 3.5 - 5.1 mmol/L    Chloride 106 95 - 110  mmol/L    CO2 22 (L) 23 - 29 mmol/L    Glucose 100 70 - 110 mg/dL    BUN, Bld 10 6 - 20 mg/dL    Creatinine 0.6 0.5 - 1.4 mg/dL    Calcium 8.6 (L) 8.7 - 10.5 mg/dL    Anion Gap 7 (L) 8 - 16 mmol/L    eGFR if African American >60.0 >60 mL/min/1.73 m^2    eGFR if non African American >60.0 >60 mL/min/1.73 m^2   Phosphorus    Collection Time: 05/05/18  4:46 AM   Result Value Ref Range    Phosphorus 2.2 (L) 2.7 - 4.5 mg/dL   CBC auto differential    Collection Time: 05/05/18  4:46 AM   Result Value Ref Range    WBC 4.10 3.90 - 12.70 K/uL    RBC 2.87 (L) 4.00 - 5.40 M/uL    Hemoglobin 11.4 (L) 12.0 - 16.0 g/dL    Hematocrit 33.3 (L) 37.0 - 48.5 %     (H) 82 - 98 fL    MCH 39.7 (H) 27.0 - 31.0 pg    MCHC 34.2 32.0 - 36.0 g/dL    RDW 15.6 (H) 11.5 - 14.5 %    Platelets 204 150 - 350 K/uL    MPV 10.4 9.2 - 12.9 fL    Immature Granulocytes 0.2 0.0 - 0.5 %    Gran # (ANC) 2.0 1.8 - 7.7 K/uL    Immature Grans (Abs) 0.01 0.00 - 0.04 K/uL    Lymph # 1.2 1.0 - 4.8 K/uL    Mono # 0.6 0.3 - 1.0 K/uL    Eos # 0.2 0.0 - 0.5 K/uL    Baso # 0.03 0.00 - 0.20 K/uL    nRBC 0 0 /100 WBC    Gran% 48.6 38.0 - 73.0 %    Lymph% 30.0 18.0 - 48.0 %    Mono% 15.4 (H) 4.0 - 15.0 %    Eosinophil% 5.1 0.0 - 8.0 %    Basophil% 0.7 0.0 - 1.9 %    Differential Method Automated    Magnesium    Collection Time: 05/05/18  4:46 AM   Result Value Ref Range    Magnesium 2.0 1.6 - 2.6 mg/dL     Diagnostic Results:  NM PET CT 04/23/2018:  FINDINGS:  Quality of the study: Adequate.    In this patient with a history of breast cancer with known osseous metastatic disease, index lesions are as follows:    - T2 left lamina with SUV max of 7.0, compared to 7.0 on prior exam.    - right middle lobe opacity with an SUV max of 1.9, compared to 1.7 on prior exam.    There has been interval development of a new focal area of increased FDG uptake in the left sacral ala with a maximum SUV of 6.3 as well as multiple hypermetabolic lymph nodes in the left  supraclavicular region, left axilla, and para-aortic/retroperitoneum.  For reference, new left axillary lymph node with a maximum SUV of 4.2.    Physiologic uptake of the tracer is present within the brain, salivary glands, myocardium, GI and  tracts.    Incidental CT findings: Multiple subcentimeter pulmonary nodules that are below the threshold for FDG uptake.  Small hiatal hernia.

## 2018-05-05 NOTE — CONSULTS
Placed 18g X 10cm midline in right basilic vein of RUE using realtime ultrasound guidance. Lot#[id number]. Remove on or before 06/03/2018 .

## 2018-05-05 NOTE — PLAN OF CARE
Problem: Patient Care Overview  Goal: Plan of Care Review  Outcome: Ongoing (interventions implemented as appropriate)  Plan of care reviewed with the patient at the beginning of the shift. Pt admitted for IVF. She has had dysphagia since receiving radiation. She is on a clear liquid diet and is tolerating ice chips. She states that if she tries to swallow any more than that, she has increased pain and it feels like her throat spasms. Pain managed with IV Morphine. She denies N/V/D. She is currently up independently without difficulty. Fall precautions maintained. Pt remained free from falls and injury this shift. Bed locked in lowest position, side rails up x2, call light within reach. Instructed pt to call for assistance as needed. Pt verbalized understanding. Vitals stable. No acute issues overnight. Will continue to monitor.

## 2018-05-05 NOTE — ASSESSMENT & PLAN NOTE
metastatic carcinoma of the left breast   -currently on afinitor and examestane, after progressing on femara and palbociclib  -s/p palliative XRT to C4 3 days PTA

## 2018-05-05 NOTE — PLAN OF CARE
Problem: Patient Care Overview  Goal: Plan of Care Review  Outcome: Ongoing (interventions implemented as appropriate)  1720:  Report called to [facility] Me[facility]n on 8th floor.  Transport services requested.  1845:  Liter of NS complete, Transport services arrived.  Patient transferred to Room 801, in stable condition, via wheelchair, accompanied by a friend.

## 2018-05-06 PROBLEM — E83.39 HYPOPHOSPHATEMIA: Status: ACTIVE | Noted: 2018-05-06

## 2018-05-06 LAB
ANION GAP SERPL CALC-SCNC: 7 MMOL/L
BASOPHILS # BLD AUTO: 0.03 K/UL
BASOPHILS NFR BLD: 1 %
BUN SERPL-MCNC: 5 MG/DL
CALCIUM SERPL-MCNC: 8.5 MG/DL
CHLORIDE SERPL-SCNC: 106 MMOL/L
CO2 SERPL-SCNC: 23 MMOL/L
CREAT SERPL-MCNC: 0.6 MG/DL
DIFFERENTIAL METHOD: ABNORMAL
EOSINOPHIL # BLD AUTO: 0.2 K/UL
EOSINOPHIL NFR BLD: 6.1 %
ERYTHROCYTE [DISTWIDTH] IN BLOOD BY AUTOMATED COUNT: 15 %
EST. GFR  (AFRICAN AMERICAN): >60 ML/MIN/1.73 M^2
EST. GFR  (NON AFRICAN AMERICAN): >60 ML/MIN/1.73 M^2
GLUCOSE SERPL-MCNC: 129 MG/DL
HCT VFR BLD AUTO: 30.7 %
HGB BLD-MCNC: 10.9 G/DL
IMM GRANULOCYTES # BLD AUTO: 0.01 K/UL
IMM GRANULOCYTES NFR BLD AUTO: 0.3 %
LYMPHOCYTES # BLD AUTO: 1.1 K/UL
LYMPHOCYTES NFR BLD: 35 %
MAGNESIUM SERPL-MCNC: 2.1 MG/DL
MCH RBC QN AUTO: 39.8 PG
MCHC RBC AUTO-ENTMCNC: 35.5 G/DL
MCV RBC AUTO: 112 FL
MONOCYTES # BLD AUTO: 0.5 K/UL
MONOCYTES NFR BLD: 15.1 %
NEUTROPHILS # BLD AUTO: 1.3 K/UL
NEUTROPHILS NFR BLD: 42.5 %
NRBC BLD-RTO: 0 /100 WBC
PHOSPHATE SERPL-MCNC: 1.8 MG/DL
PLATELET # BLD AUTO: 180 K/UL
PMV BLD AUTO: 9.7 FL
POTASSIUM SERPL-SCNC: 3.8 MMOL/L
RBC # BLD AUTO: 2.74 M/UL
SODIUM SERPL-SCNC: 136 MMOL/L
WBC # BLD AUTO: 3.11 K/UL

## 2018-05-06 PROCEDURE — 25000003 PHARM REV CODE 250: Performed by: HOSPITALIST

## 2018-05-06 PROCEDURE — 63600175 PHARM REV CODE 636 W HCPCS: Performed by: STUDENT IN AN ORGANIZED HEALTH CARE EDUCATION/TRAINING PROGRAM

## 2018-05-06 PROCEDURE — 20600001 HC STEP DOWN PRIVATE ROOM

## 2018-05-06 PROCEDURE — 99233 SBSQ HOSP IP/OBS HIGH 50: CPT | Mod: ,,, | Performed by: INTERNAL MEDICINE

## 2018-05-06 PROCEDURE — 80048 BASIC METABOLIC PNL TOTAL CA: CPT

## 2018-05-06 PROCEDURE — 85025 COMPLETE CBC W/AUTO DIFF WBC: CPT

## 2018-05-06 PROCEDURE — 36415 COLL VENOUS BLD VENIPUNCTURE: CPT

## 2018-05-06 PROCEDURE — S5010 5% DEXTROSE AND 0.45% SALINE: HCPCS | Performed by: HOSPITALIST

## 2018-05-06 PROCEDURE — 83735 ASSAY OF MAGNESIUM: CPT

## 2018-05-06 PROCEDURE — 84100 ASSAY OF PHOSPHORUS: CPT

## 2018-05-06 PROCEDURE — 25000003 PHARM REV CODE 250: Performed by: STUDENT IN AN ORGANIZED HEALTH CARE EDUCATION/TRAINING PROGRAM

## 2018-05-06 RX ORDER — DEXTROSE MONOHYDRATE AND SODIUM CHLORIDE 5; .45 G/100ML; G/100ML
INJECTION, SOLUTION INTRAVENOUS CONTINUOUS
Status: ACTIVE | OUTPATIENT
Start: 2018-05-06 | End: 2018-05-07

## 2018-05-06 RX ADMIN — MORPHINE SULFATE 2 MG: 2 INJECTION, SOLUTION INTRAMUSCULAR; INTRAVENOUS at 06:05

## 2018-05-06 RX ADMIN — SODIUM PHOSPHATE, MONOBASIC, MONOHYDRATE 20.01 MMOL: 276; 142 INJECTION, SOLUTION INTRAVENOUS at 09:05

## 2018-05-06 RX ADMIN — ENOXAPARIN SODIUM 40 MG: 100 INJECTION SUBCUTANEOUS at 06:05

## 2018-05-06 RX ADMIN — DEXTROSE AND SODIUM CHLORIDE: 5; .45 INJECTION, SOLUTION INTRAVENOUS at 01:05

## 2018-05-06 RX ADMIN — MORPHINE SULFATE 2 MG: 2 INJECTION, SOLUTION INTRAMUSCULAR; INTRAVENOUS at 01:05

## 2018-05-06 NOTE — PROGRESS NOTES
[facility]  Hematology/Oncology  Progress Note    Patient Name: Georgia Jeffrey  Admission Date: 5/4/2018  Hospital Length of Stay: 2 days  Code Status: Full Code     Subjective:     HPI:  Georgia Jeffrey is a 62 y.o. F patient of Dr. Whatley's with metastatic carcinoma of the left breast (currently on afinitor and examestane, after progressing on femara and palbociclib), s/p palliative XRT to C4 three days ago, who called earlier today complaining of inability to swallow for several days, and was asked to present for evaluation. Reports about      Oncologic History:  She is currently on Faslodex and palbociclib.  In addition she is on Zometa for all metastasis.     She is receiving radiation to her T 2 metastasis with Dr. Witt on the [facility]. She has had 4 treatments thus far.  She will have 10-15 treatments.  She reports her major side effect is been some severe fatigue.  She's remained capable of doing all of her normal activities but has to struggle with that.  She has some slight scratchiness in her throat which she relates to the radiation.  Her bowels have been variable.  She has no shortness of breath.       Breast  History: She developed palpable abnormality in her left breas in the early part of 2017.    Diagnostic mammography from April 18, 2017 showed an irregular mass with pleomorphic calcifications at the 9:00 area of the left breast.  By ultrasound this was solid mass measuring 36 mm.     Breast biopsy on April 19, 2017 showed infiltrating ductal carcinoma (histologic grade 3, nuclear grade 2, mitotic index 3) tumor was 99% ER positive, 91% DC positive.  HER-2 was negative by FISH.     On May 11, 2017 she underwent bilateral nipple sparing mastectomies by Dr.Karl Gutierrez and autologous breast reconstruction by  at [facility].  She developed some compromise of her left flap which was removed and an implant was placed.  That became  infected and was removed.  Several weeks ago she underwent removal of residual reconstruction from both sides.     The pathology from that surgery showed a 3 cm high grade grading ductal carcinoma (3+3+2).  There is associated lymphovascular invasion.  In addition was associated high-grade DCIS.  [place] lymph node was negative.    The right breast showed no evidence of any abnormality.  Final pathological stage TII N0 stage IIA.  Oncotype was 31.     She was seen by medical oncology and chemotherapy was discussed.      PET/CT scan was performed on June 27 which showed increased uptake in the subcarinal node measuring 9 mm an SUV of 5.9, there was increased uptake in the right hilum with an SUV of 5.9, there were multiple pulmonary nodules on the right side a right middle lobe nodule measured 16 mm with an SUV of 6.8 right lower lobe nodule 9 mm with an SUV of 2.6.  In addition there were multiple other subcentimeter nodules.  Increased uptake was seen in the right intertrochanteric femur with an SUV of 4.  (Subsequent MRI of the right femur on July 14 did not show any bony abnormality).     On July 14 she underwent bronchoscopy and endoscopic ultrasound.  Left lower lobe brush sample was positive for carcinoma and a fine-needle aspirate from a station 7 lymph node was also positive for carcinoma.  These were estrogen receptor positive.     In July 2017 she was started on systemic therapy with Faslodex and palbociclib.  In August 2017 she developed a DVT of the left lower extremity with the peroneal and posterior tibial veins exhibiting thrombosis.  She was started on apixaban at that time.  Follow-up PET scan in September  suggested bone metastasis.     Due to insurance  issues she was changed to letrozole in October 2017 and continued her palbociclib. She had significant nausea and vomiting secondary to letrozole and that was discontinued.     On December 1, 2017 she underwent MRI scanning of the thoracic and  lumbar spine.  That showed a metastasis at the left T2 lamina.  The lumbar spine was negative for malignancy Subsequent MRI scans on December 4 were negative in the cervical spine and brain were all negative for malignancy.  Chest x-ray in December 4 was clear.     She was off all therapy for about 6 weeks then restarted  faslodex and palbociclib on  12/17/17.    Interval History: NAEON. Replacing phos today IV as pt still has difficulty swallowing     Oncology Treatment Plan:   [No treatment plan]    Medications:  Continuous Infusions:   dextrose 5 % and 0.45 % NaCl 100 mL/hr at 05/06/18 0152     Scheduled Meds:   enoxaparin  40 mg Subcutaneous Daily    sodium phosphate IVPB  20.01 mmol Intravenous Once     PRN Meds:duke's soln (benadryl 30 mL, mylanta 30 mL, lidocane 30 mL, nystatin 30 mL) 120 mL, morphine, ramelteon, sodium chloride 0.9%     Review of Systems   Constitutional: Positive for fatigue. Negative for activity change, appetite change, chills and fever.   HENT: Positive for sore throat and trouble swallowing. Negative for congestion and facial swelling.    Eyes: Negative for discharge, itching and visual disturbance.   Respiratory: Negative for apnea, cough, chest tightness and shortness of breath.    Cardiovascular: Negative for chest pain and palpitations.   Gastrointestinal: Negative for abdominal distention, anal bleeding, blood in stool, constipation, diarrhea and nausea.   Endocrine: Negative for cold intolerance and heat intolerance.   Genitourinary: Negative for difficulty urinating and dysuria.   Musculoskeletal: Positive for arthralgias and myalgias.   Skin: Negative for color change and pallor.   Neurological: Negative for dizziness and headaches.   Hematological: Negative for adenopathy. Does not bruise/bleed easily.   Psychiatric/Behavioral: Negative for agitation and behavioral problems.     Objective:     Vital Signs (Most Recent):  Temp: 98.4 °F (36.9 °C) (05/06/18 0500)  Pulse: 87  (05/06/18 0500)  Resp: 16 (05/06/18 0500)  BP: 135/78 (05/06/18 0500)  SpO2: 98 % (05/06/18 0500) Vital Signs (24h Range):  Temp:  [97.1 °F (36.2 °C)-98.6 °F (37 °C)] 98.4 °F (36.9 °C)  Pulse:  [79-87] 87  Resp:  [16-20] 16  SpO2:  [95 %-100 %] 98 %  BP: (124-137)/(71-78) 135/78     Weight: 68.8 kg (151 lb 9.1 oz)  Body mass index is 26.02 kg/m².  Body surface area is 1.76 meters squared.      Intake/Output Summary (Last 24 hours) at 05/06/18 0609  Last data filed at 05/06/18 0434   Gross per 24 hour   Intake          2156.33 ml   Output             2450 ml   Net          -293.67 ml       Physical Exam   Constitutional: She is oriented to person, place, and time. She appears well-developed and well-nourished.   HENT:   Head: Normocephalic and atraumatic.   Eyes: Conjunctivae, EOM and lids are normal.   Neck: Phonation normal. No edema present.   Cardiovascular: Normal rate, regular rhythm and normal heart sounds.    Pulmonary/Chest: Effort normal and breath sounds normal.   Abdominal: Soft. Normal appearance and bowel sounds are normal. There is no hepatosplenomegaly.   Neurological: She is alert and oriented to person, place, and time.   Skin: Skin is warm, dry and intact.   Psychiatric: She has a normal mood and affect. Her speech is normal and behavior is normal.   Vitals reviewed.      Significant Labs:   Recent Results (from the past 24 hour(s))   Basic metabolic panel    Collection Time: 05/06/18  3:57 AM   Result Value Ref Range    Sodium 136 136 - 145 mmol/L    Potassium 3.8 3.5 - 5.1 mmol/L    Chloride 106 95 - 110 mmol/L    CO2 23 23 - 29 mmol/L    Glucose 129 (H) 70 - 110 mg/dL    BUN, Bld 5 (L) 6 - 20 mg/dL    Creatinine 0.6 0.5 - 1.4 mg/dL    Calcium 8.5 (L) 8.7 - 10.5 mg/dL    Anion Gap 7 (L) 8 - 16 mmol/L    eGFR if African American >60.0 >60 mL/min/1.73 m^2    eGFR if non African American >60.0 >60 mL/min/1.73 m^2   Phosphorus    Collection Time: 05/06/18  3:57 AM   Result Value Ref Range     Phosphorus 1.8 (L) 2.7 - 4.5 mg/dL   CBC auto differential    Collection Time: 05/06/18  3:57 AM   Result Value Ref Range    WBC 3.11 (L) 3.90 - 12.70 K/uL    RBC 2.74 (L) 4.00 - 5.40 M/uL    Hemoglobin 10.9 (L) 12.0 - 16.0 g/dL    Hematocrit 30.7 (L) 37.0 - 48.5 %     (H) 82 - 98 fL    MCH 39.8 (H) 27.0 - 31.0 pg    MCHC 35.5 32.0 - 36.0 g/dL    RDW 15.0 (H) 11.5 - 14.5 %    Platelets 180 150 - 350 K/uL    MPV 9.7 9.2 - 12.9 fL    Immature Granulocytes 0.3 0.0 - 0.5 %    Gran # (ANC) 1.3 (L) 1.8 - 7.7 K/uL    Immature Grans (Abs) 0.01 0.00 - 0.04 K/uL    Lymph # 1.1 1.0 - 4.8 K/uL    Mono # 0.5 0.3 - 1.0 K/uL    Eos # 0.2 0.0 - 0.5 K/uL    Baso # 0.03 0.00 - 0.20 K/uL    nRBC 0 0 /100 WBC    Gran% 42.5 38.0 - 73.0 %    Lymph% 35.0 18.0 - 48.0 %    Mono% 15.1 (H) 4.0 - 15.0 %    Eosinophil% 6.1 0.0 - 8.0 %    Basophil% 1.0 0.0 - 1.9 %    Differential Method Automated      Diagnostic Results:  No new imaging or procedures to review since prior assessment      Assessment/Plan:     Hypophosphatemia    2/2 decreased po intake  -Replete IV PRN       Decreased oral intake    Decreased po intake x3 days  -normal renal function on todays labs, appears clinically euvolemic  -MIVF w/ D5 1/2NS @ 100 cc/hr       Radiation esophagitis    62 y.o. F w/ metastatic breast cancer who is s/p palliative XRT to C4 three days ago presents with dysphagia and odynophagia  -[place] solution PRN   -IV morphine 2mg q4h PRN for pain   -MIVF      Carcinoma of breast metastatic to intrathoracic lymph node    metastatic carcinoma of the left breast   -currently on afinitor and examestane, after progressing on femara and palbociclib  -s/p palliative XRT to C4 3 days PTA        Staff attestation to follow, treatment plan not yet discussed with attending physician.    Ry Luther MD  Hematology/Oncology, PGY-1  [facility]      I have reviewed the notes, assessments, and/or procedures performed by the housestaff, as  above.  I have personally interviewed and examined the patient at the beside, and rounded with the housestaff. I concur with her/his assessment and plan and the documentation of Georgia Jeffrey.  I, Dr. Hero Mcknight, personally spent more than  35 mins during this encounter, greater than 50% was spent in direct counseling and/or coordination of care.     Hero Mcknight M.D., M.S., F.A.C.P.  Hematology/Oncology Attending  [facility]

## 2018-05-06 NOTE — SUBJECTIVE & OBJECTIVE
Interval History: NAEON. Replacing phos today IV as pt still has difficulty swallowing     Oncology Treatment Plan:   [No treatment plan]    Medications:  Continuous Infusions:   dextrose 5 % and 0.45 % NaCl 100 mL/hr at 05/06/18 0152     Scheduled Meds:   enoxaparin  40 mg Subcutaneous Daily    sodium phosphate IVPB  20.01 mmol Intravenous Once     PRN Meds:duke's soln (benadryl 30 mL, mylanta 30 mL, lidocane 30 mL, nystatin 30 mL) 120 mL, morphine, ramelteon, sodium chloride 0.9%     Review of Systems   Constitutional: Positive for fatigue. Negative for activity change, appetite change, chills and fever.   HENT: Positive for sore throat and trouble swallowing. Negative for congestion and facial swelling.    Eyes: Negative for discharge, itching and visual disturbance.   Respiratory: Negative for apnea, cough, chest tightness and shortness of breath.    Cardiovascular: Negative for chest pain and palpitations.   Gastrointestinal: Negative for abdominal distention, anal bleeding, blood in stool, constipation, diarrhea and nausea.   Endocrine: Negative for cold intolerance and heat intolerance.   Genitourinary: Negative for difficulty urinating and dysuria.   Musculoskeletal: Positive for arthralgias and myalgias.   Skin: Negative for color change and pallor.   Neurological: Negative for dizziness and headaches.   Hematological: Negative for adenopathy. Does not bruise/bleed easily.   Psychiatric/Behavioral: Negative for agitation and behavioral problems.     Objective:     Vital Signs (Most Recent):  Temp: 98.4 °F (36.9 °C) (05/06/18 0500)  Pulse: 87 (05/06/18 0500)  Resp: 16 (05/06/18 0500)  BP: 135/78 (05/06/18 0500)  SpO2: 98 % (05/06/18 0500) Vital Signs (24h Range):  Temp:  [97.1 °F (36.2 °C)-98.6 °F (37 °C)] 98.4 °F (36.9 °C)  Pulse:  [79-87] 87  Resp:  [16-20] 16  SpO2:  [95 %-100 %] 98 %  BP: (124-137)/(71-78) 135/78     Weight: 68.8 kg (151 lb 9.1 oz)  Body mass index is 26.02 kg/m².  Body surface area  is 1.76 meters squared.      Intake/Output Summary (Last 24 hours) at 05/06/18 0609  Last data filed at 05/06/18 0434   Gross per 24 hour   Intake          2156.33 ml   Output             2450 ml   Net          -293.67 ml       Physical Exam   Constitutional: She is oriented to person, place, and time. She appears well-developed and well-nourished.   HENT:   Head: Normocephalic and atraumatic.   Eyes: Conjunctivae, EOM and lids are normal.   Neck: Phonation normal. No edema present.   Cardiovascular: Normal rate, regular rhythm and normal heart sounds.    Pulmonary/Chest: Effort normal and breath sounds normal.   Abdominal: Soft. Normal appearance and bowel sounds are normal. There is no hepatosplenomegaly.   Neurological: She is alert and oriented to person, place, and time.   Skin: Skin is warm, dry and intact.   Psychiatric: She has a normal mood and affect. Her speech is normal and behavior is normal.   Vitals reviewed.      Significant Labs:   Recent Results (from the past 24 hour(s))   Basic metabolic panel    Collection Time: 05/06/18  3:57 AM   Result Value Ref Range    Sodium 136 136 - 145 mmol/L    Potassium 3.8 3.5 - 5.1 mmol/L    Chloride 106 95 - 110 mmol/L    CO2 23 23 - 29 mmol/L    Glucose 129 (H) 70 - 110 mg/dL    BUN, Bld 5 (L) 6 - 20 mg/dL    Creatinine 0.6 0.5 - 1.4 mg/dL    Calcium 8.5 (L) 8.7 - 10.5 mg/dL    Anion Gap 7 (L) 8 - 16 mmol/L    eGFR if African American >60.0 >60 mL/min/1.73 m^2    eGFR if non African American >60.0 >60 mL/min/1.73 m^2   Phosphorus    Collection Time: 05/06/18  3:57 AM   Result Value Ref Range    Phosphorus 1.8 (L) 2.7 - 4.5 mg/dL   CBC auto differential    Collection Time: 05/06/18  3:57 AM   Result Value Ref Range    WBC 3.11 (L) 3.90 - 12.70 K/uL    RBC 2.74 (L) 4.00 - 5.40 M/uL    Hemoglobin 10.9 (L) 12.0 - 16.0 g/dL    Hematocrit 30.7 (L) 37.0 - 48.5 %     (H) 82 - 98 fL    MCH 39.8 (H) 27.0 - 31.0 pg    MCHC 35.5 32.0 - 36.0 g/dL    RDW 15.0 (H) 11.5 -  14.5 %    Platelets 180 150 - 350 K/uL    MPV 9.7 9.2 - 12.9 fL    Immature Granulocytes 0.3 0.0 - 0.5 %    Gran # (ANC) 1.3 (L) 1.8 - 7.7 K/uL    Immature Grans (Abs) 0.01 0.00 - 0.04 K/uL    Lymph # 1.1 1.0 - 4.8 K/uL    Mono # 0.5 0.3 - 1.0 K/uL    Eos # 0.2 0.0 - 0.5 K/uL    Baso # 0.03 0.00 - 0.20 K/uL    nRBC 0 0 /100 WBC    Gran% 42.5 38.0 - 73.0 %    Lymph% 35.0 18.0 - 48.0 %    Mono% 15.1 (H) 4.0 - 15.0 %    Eosinophil% 6.1 0.0 - 8.0 %    Basophil% 1.0 0.0 - 1.9 %    Differential Method Automated      Diagnostic Results:  No new imaging or procedures to review since prior assessment

## 2018-05-07 PROBLEM — E87.6 HYPOKALEMIA: Status: ACTIVE | Noted: 2018-05-07

## 2018-05-07 LAB
ANION GAP SERPL CALC-SCNC: 8 MMOL/L
BASOPHILS # BLD AUTO: 0.04 K/UL
BASOPHILS NFR BLD: 1.2 %
BUN SERPL-MCNC: 4 MG/DL
CALCIUM SERPL-MCNC: 8.8 MG/DL
CHLORIDE SERPL-SCNC: 105 MMOL/L
CO2 SERPL-SCNC: 24 MMOL/L
CREAT SERPL-MCNC: 0.6 MG/DL
DIFFERENTIAL METHOD: ABNORMAL
EOSINOPHIL # BLD AUTO: 0.3 K/UL
EOSINOPHIL NFR BLD: 9.4 %
ERYTHROCYTE [DISTWIDTH] IN BLOOD BY AUTOMATED COUNT: 14.6 %
EST. GFR  (AFRICAN AMERICAN): >60 ML/MIN/1.73 M^2
EST. GFR  (NON AFRICAN AMERICAN): >60 ML/MIN/1.73 M^2
GLUCOSE SERPL-MCNC: 120 MG/DL
HCT VFR BLD AUTO: 29.9 %
HGB BLD-MCNC: 10.9 G/DL
IMM GRANULOCYTES # BLD AUTO: 0.01 K/UL
IMM GRANULOCYTES NFR BLD AUTO: 0.3 %
LYMPHOCYTES # BLD AUTO: 1.2 K/UL
LYMPHOCYTES NFR BLD: 36.1 %
MCH RBC QN AUTO: 39.9 PG
MCHC RBC AUTO-ENTMCNC: 36.5 G/DL
MCV RBC AUTO: 110 FL
MONOCYTES # BLD AUTO: 0.4 K/UL
MONOCYTES NFR BLD: 12.7 %
NEUTROPHILS # BLD AUTO: 1.3 K/UL
NEUTROPHILS NFR BLD: 40.3 %
NRBC BLD-RTO: 0 /100 WBC
PHOSPHATE SERPL-MCNC: 2.1 MG/DL
PLATELET # BLD AUTO: 168 K/UL
PMV BLD AUTO: 9.4 FL
POTASSIUM SERPL-SCNC: 3.4 MMOL/L
RBC # BLD AUTO: 2.73 M/UL
SODIUM SERPL-SCNC: 137 MMOL/L
WBC # BLD AUTO: 3.3 K/UL

## 2018-05-07 PROCEDURE — 25000003 PHARM REV CODE 250: Performed by: STUDENT IN AN ORGANIZED HEALTH CARE EDUCATION/TRAINING PROGRAM

## 2018-05-07 PROCEDURE — 20600001 HC STEP DOWN PRIVATE ROOM

## 2018-05-07 PROCEDURE — S5010 5% DEXTROSE AND 0.45% SALINE: HCPCS | Performed by: INTERNAL MEDICINE

## 2018-05-07 PROCEDURE — 25000003 PHARM REV CODE 250: Performed by: INTERNAL MEDICINE

## 2018-05-07 PROCEDURE — 85025 COMPLETE CBC W/AUTO DIFF WBC: CPT

## 2018-05-07 PROCEDURE — 63600175 PHARM REV CODE 636 W HCPCS: Performed by: STUDENT IN AN ORGANIZED HEALTH CARE EDUCATION/TRAINING PROGRAM

## 2018-05-07 PROCEDURE — 99233 SBSQ HOSP IP/OBS HIGH 50: CPT | Mod: ,,, | Performed by: INTERNAL MEDICINE

## 2018-05-07 PROCEDURE — 63600175 PHARM REV CODE 636 W HCPCS: Performed by: INTERNAL MEDICINE

## 2018-05-07 PROCEDURE — 94761 N-INVAS EAR/PLS OXIMETRY MLT: CPT

## 2018-05-07 PROCEDURE — 84100 ASSAY OF PHOSPHORUS: CPT

## 2018-05-07 PROCEDURE — 80048 BASIC METABOLIC PNL TOTAL CA: CPT

## 2018-05-07 PROCEDURE — S5010 5% DEXTROSE AND 0.45% SALINE: HCPCS | Performed by: STUDENT IN AN ORGANIZED HEALTH CARE EDUCATION/TRAINING PROGRAM

## 2018-05-07 RX ORDER — HYDROCORTISONE 1 %
CREAM (GRAM) TOPICAL 2 TIMES DAILY
Status: DISCONTINUED | OUTPATIENT
Start: 2018-05-07 | End: 2018-05-09 | Stop reason: HOSPADM

## 2018-05-07 RX ORDER — DEXTROSE MONOHYDRATE AND SODIUM CHLORIDE 5; .45 G/100ML; G/100ML
INJECTION, SOLUTION INTRAVENOUS CONTINUOUS
Status: ACTIVE | OUTPATIENT
Start: 2018-05-07 | End: 2018-05-08

## 2018-05-07 RX ORDER — POTASSIUM CHLORIDE 7.45 MG/ML
10 INJECTION INTRAVENOUS
Status: COMPLETED | OUTPATIENT
Start: 2018-05-07 | End: 2018-05-07

## 2018-05-07 RX ORDER — MORPHINE SULFATE 2 MG/ML
2 INJECTION, SOLUTION INTRAMUSCULAR; INTRAVENOUS
Status: DISCONTINUED | OUTPATIENT
Start: 2018-05-07 | End: 2018-05-08

## 2018-05-07 RX ADMIN — ENOXAPARIN SODIUM 40 MG: 100 INJECTION SUBCUTANEOUS at 06:05

## 2018-05-07 RX ADMIN — DEXTROSE AND SODIUM CHLORIDE: 5; .45 INJECTION, SOLUTION INTRAVENOUS at 12:05

## 2018-05-07 RX ADMIN — MORPHINE SULFATE 2 MG: 2 INJECTION, SOLUTION INTRAMUSCULAR; INTRAVENOUS at 02:05

## 2018-05-07 RX ADMIN — HYDROCORTISONE: 10 CREAM TOPICAL at 09:05

## 2018-05-07 RX ADMIN — MORPHINE SULFATE 2 MG: 2 INJECTION, SOLUTION INTRAMUSCULAR; INTRAVENOUS at 09:05

## 2018-05-07 RX ADMIN — MORPHINE SULFATE 2 MG: 2 INJECTION, SOLUTION INTRAMUSCULAR; INTRAVENOUS at 06:05

## 2018-05-07 RX ADMIN — POTASSIUM CHLORIDE 10 MEQ: 7.46 INJECTION, SOLUTION INTRAVENOUS at 07:05

## 2018-05-07 RX ADMIN — RAMELTEON 8 MG: 8 TABLET, FILM COATED ORAL at 09:05

## 2018-05-07 RX ADMIN — HYDROCORTISONE: 10 CREAM TOPICAL at 11:05

## 2018-05-07 RX ADMIN — DEXTROSE AND SODIUM CHLORIDE: 5; .45 INJECTION, SOLUTION INTRAVENOUS at 10:05

## 2018-05-07 RX ADMIN — MORPHINE SULFATE 2 MG: 2 INJECTION, SOLUTION INTRAMUSCULAR; INTRAVENOUS at 12:05

## 2018-05-07 RX ADMIN — POTASSIUM CHLORIDE 10 MEQ: 7.46 INJECTION, SOLUTION INTRAVENOUS at 09:05

## 2018-05-07 NOTE — ASSESSMENT & PLAN NOTE
Decreased po intake x3 days  -normal renal function on todays labs, appears clinically euvolemic  -IVF w/ D5 1/2NS @ 100 cc/hr

## 2018-05-07 NOTE — PROGRESS NOTES
Patient remains free from falls and injury this shift. Bed in low, locked position with call bell in reach. Patient up ad jhony. Iv morpine given once for mouth pain. All belongings within reach will continue to monitor.

## 2018-05-07 NOTE — SUBJECTIVE & OBJECTIVE
Interval History: NAEON. Patient is tolerating her own oral secretions and was able to swallow small amount of water this morning. She reports small rash to skin overlying her sternum, at location of radiation.     Oncology Treatment Plan:   [No treatment plan]    Medications:  Continuous Infusions:   dextrose 5 % and 0.45 % NaCl 100 mL/hr at 05/07/18 0018     Scheduled Meds:   enoxaparin  40 mg Subcutaneous Daily    hydrocortisone   Topical (Top) BID     PRN Meds:duke's soln (benadryl 30 mL, mylanta 30 mL, lidocane 30 mL, nystatin 30 mL) 120 mL, morphine, ramelteon, sodium chloride 0.9%     Review of Systems   Constitutional: Negative for fatigue and fever.   HENT: Positive for sore throat and trouble swallowing.    Eyes: Negative for itching.   Respiratory: Negative for cough and shortness of breath.    Cardiovascular: Negative for chest pain and palpitations.   Gastrointestinal: Negative for constipation, diarrhea and nausea.   Genitourinary: Negative for dysuria.   Musculoskeletal: Negative for myalgias.   Skin: Positive for rash.   Neurological: Negative for dizziness and headaches.   Psychiatric/Behavioral: Negative for agitation.     Objective:     Vital Signs (Most Recent):  Temp: 97.8 °F (36.6 °C) (05/07/18 1115)  Pulse: 81 (05/07/18 1115)  Resp: 18 (05/07/18 1115)  BP: 138/79 (05/07/18 1115)  SpO2: 98 % (05/07/18 1115) Vital Signs (24h Range):  Temp:  [97.1 °F (36.2 °C)-98.5 °F (36.9 °C)] 97.8 °F (36.6 °C)  Pulse:  [78-92] 81  Resp:  [16-20] 18  SpO2:  [94 %-100 %] 98 %  BP: (134-160)/(68-83) 138/79     Weight: 68.8 kg (151 lb 9.1 oz)  Body mass index is 26.02 kg/m².  Body surface area is 1.76 meters squared.      Intake/Output Summary (Last 24 hours) at 05/07/18 1142  Last data filed at 05/07/18 0400   Gross per 24 hour   Intake             2172 ml   Output              450 ml   Net             1722 ml       Physical Exam   Constitutional: She is oriented to person, place, and time. She appears  well-developed and well-nourished.   HENT:   Head: Normocephalic and atraumatic.   Eyes: Conjunctivae, EOM and lids are normal.   Neck: Phonation normal. No edema present.   Cardiovascular: Normal rate, regular rhythm and normal heart sounds.    Pulmonary/Chest: Effort normal and breath sounds normal.   Abdominal: Soft. Normal appearance and bowel sounds are normal. There is no hepatosplenomegaly.   Neurological: She is alert and oriented to person, place, and time.   Skin: Skin is warm, dry and intact.   3cm area of erythema to sternoclavicular junction, mildly tender to palpation   Psychiatric: She has a normal mood and affect. Her speech is normal and behavior is normal.   Vitals reviewed.      Significant Labs:   Recent Results (from the past 24 hour(s))   Basic metabolic panel    Collection Time: 05/07/18  4:03 AM   Result Value Ref Range    Sodium 137 136 - 145 mmol/L    Potassium 3.4 (L) 3.5 - 5.1 mmol/L    Chloride 105 95 - 110 mmol/L    CO2 24 23 - 29 mmol/L    Glucose 120 (H) 70 - 110 mg/dL    BUN, Bld 4 (L) 6 - 20 mg/dL    Creatinine 0.6 0.5 - 1.4 mg/dL    Calcium 8.8 8.7 - 10.5 mg/dL    Anion Gap 8 8 - 16 mmol/L    eGFR if African American >60.0 >60 mL/min/1.73 m^2    eGFR if non African American >60.0 >60 mL/min/1.73 m^2   CBC auto differential    Collection Time: 05/07/18  4:03 AM   Result Value Ref Range    WBC 3.30 (L) 3.90 - 12.70 K/uL    RBC 2.73 (L) 4.00 - 5.40 M/uL    Hemoglobin 10.9 (L) 12.0 - 16.0 g/dL    Hematocrit 29.9 (L) 37.0 - 48.5 %     (H) 82 - 98 fL    MCH 39.9 (H) 27.0 - 31.0 pg    MCHC 36.5 (H) 32.0 - 36.0 g/dL    RDW 14.6 (H) 11.5 - 14.5 %    Platelets 168 150 - 350 K/uL    MPV 9.4 9.2 - 12.9 fL    Immature Granulocytes 0.3 0.0 - 0.5 %    Gran # (ANC) 1.3 (L) 1.8 - 7.7 K/uL    Immature Grans (Abs) 0.01 0.00 - 0.04 K/uL    Lymph # 1.2 1.0 - 4.8 K/uL    Mono # 0.4 0.3 - 1.0 K/uL    Eos # 0.3 0.0 - 0.5 K/uL    Baso # 0.04 0.00 - 0.20 K/uL    nRBC 0 0 /100 WBC    Gran% 40.3 38.0 -  73.0 %    Lymph% 36.1 18.0 - 48.0 %    Mono% 12.7 4.0 - 15.0 %    Eosinophil% 9.4 (H) 0.0 - 8.0 %    Basophil% 1.2 0.0 - 1.9 %    Differential Method Automated    Phosphorus    Collection Time: 05/07/18  4:03 AM   Result Value Ref Range    Phosphorus 2.1 (L) 2.7 - 4.5 mg/dL     Diagnostic Results:  No new imaging or procedures to review since prior assessment

## 2018-05-07 NOTE — HOSPITAL COURSE
Ms. Jeffrey was admitted to medical oncology on 5/4 for dysphagia secondary to radiation esophagitis. She was treated with supportive care including IVF and electrolyte replacement. On 5/9, she was tolerating soft foods, and was discharged home.

## 2018-05-07 NOTE — ASSESSMENT & PLAN NOTE
62 y.o. F w/ metastatic breast cancer who is s/p palliative XRT to C4 three days ago presents with dysphagia and odynophagia  Slowly improving with time and supportive care    -[place] solution PRN   -IV morphine 2mg q4h PRN for pain, will transition to PO medication when able to tolerate  -IVF

## 2018-05-07 NOTE — PROGRESS NOTES
[facility]  Hematology/Oncology  Progress Note    Patient Name: Georgia Jeffrey  Admission Date: 5/4/2018  Hospital Length of Stay: 3 days  Code Status: Full Code     Subjective:     HPI:  Georgia Jeffrey is a 62 y.o. F patient of Dr. Whatley's with metastatic carcinoma of the left breast (currently on afinitor and examestane, after progressing on femara and palbociclib), s/p palliative XRT to C4 three days ago, who called earlier today complaining of inability to swallow for several days, and was asked to present for evaluation. Reports about      Oncologic History:  She is currently on Faslodex and palbociclib.  In addition she is on Zometa for all metastasis.     She is receiving radiation to her T 2 metastasis with Dr. Witt on the [facility]. She has had 4 treatments thus far.  She will have 10-15 treatments.  She reports her major side effect is been some severe fatigue.  She's remained capable of doing all of her normal activities but has to struggle with that.  She has some slight scratchiness in her throat which she relates to the radiation.  Her bowels have been variable.  She has no shortness of breath.       Breast  History: She developed palpable abnormality in her left breas in the early part of 2017.    Diagnostic mammography from April 18, 2017 showed an irregular mass with pleomorphic calcifications at the 9:00 area of the left breast.  By ultrasound this was solid mass measuring 36 mm.     Breast biopsy on April 19, 2017 showed infiltrating ductal carcinoma (histologic grade 3, nuclear grade 2, mitotic index 3) tumor was 99% ER positive, 91% RI positive.  HER-2 was negative by FISH.     On May 11, 2017 she underwent bilateral nipple sparing mastectomies by Dr.Karl Gutierrez and autologous breast reconstruction by  at [facility].  She developed some compromise of her left flap which was removed and an implant was placed.  That became  infected and was removed.  Several weeks ago she underwent removal of residual reconstruction from both sides.     The pathology from that surgery showed a 3 cm high grade grading ductal carcinoma (3+3+2).  There is associated lymphovascular invasion.  In addition was associated high-grade DCIS.  [place] lymph node was negative.    The right breast showed no evidence of any abnormality.  Final pathological stage TII N0 stage IIA.  Oncotype was 31.     She was seen by medical oncology and chemotherapy was discussed.      PET/CT scan was performed on June 27 which showed increased uptake in the subcarinal node measuring 9 mm an SUV of 5.9, there was increased uptake in the right hilum with an SUV of 5.9, there were multiple pulmonary nodules on the right side a right middle lobe nodule measured 16 mm with an SUV of 6.8 right lower lobe nodule 9 mm with an SUV of 2.6.  In addition there were multiple other subcentimeter nodules.  Increased uptake was seen in the right intertrochanteric femur with an SUV of 4.  (Subsequent MRI of the right femur on July 14 did not show any bony abnormality).     On July 14 she underwent bronchoscopy and endoscopic ultrasound.  Left lower lobe brush sample was positive for carcinoma and a fine-needle aspirate from a station 7 lymph node was also positive for carcinoma.  These were estrogen receptor positive.     In July 2017 she was started on systemic therapy with Faslodex and palbociclib.  In August 2017 she developed a DVT of the left lower extremity with the peroneal and posterior tibial veins exhibiting thrombosis.  She was started on apixaban at that time.  Follow-up PET scan in September  suggested bone metastasis.     Due to insurance  issues she was changed to letrozole in October 2017 and continued her palbociclib. She had significant nausea and vomiting secondary to letrozole and that was discontinued.     On December 1, 2017 she underwent MRI scanning of the thoracic and  lumbar spine.  That showed a metastasis at the left T2 lamina.  The lumbar spine was negative for malignancy Subsequent MRI scans on December 4 were negative in the cervical spine and brain were all negative for malignancy.  Chest x-ray in December 4 was clear.     She was off all therapy for about 6 weeks then restarted  faslodex and palbociclib on  12/17/17.    Interval History: NAEON. Patient is tolerating her own oral secretions and was able to swallow small amount of water this morning. She reports small rash to skin overlying her sternum, at location of radiation.     Oncology Treatment Plan:   [No treatment plan]    Medications:  Continuous Infusions:   dextrose 5 % and 0.45 % NaCl 100 mL/hr at 05/07/18 0018     Scheduled Meds:   enoxaparin  40 mg Subcutaneous Daily    hydrocortisone   Topical (Top) BID     PRN Meds:duke's soln (benadryl 30 mL, mylanta 30 mL, lidocane 30 mL, nystatin 30 mL) 120 mL, morphine, ramelteon, sodium chloride 0.9%     Review of Systems   Constitutional: Negative for fatigue and fever.   HENT: Positive for sore throat and trouble swallowing.    Eyes: Negative for itching.   Respiratory: Negative for cough and shortness of breath.    Cardiovascular: Negative for chest pain and palpitations.   Gastrointestinal: Negative for constipation, diarrhea and nausea.   Genitourinary: Negative for dysuria.   Musculoskeletal: Negative for myalgias.   Skin: Positive for rash.   Neurological: Negative for dizziness and headaches.   Psychiatric/Behavioral: Negative for agitation.     Objective:     Vital Signs (Most Recent):  Temp: 97.8 °F (36.6 °C) (05/07/18 1115)  Pulse: 81 (05/07/18 1115)  Resp: 18 (05/07/18 1115)  BP: 138/79 (05/07/18 1115)  SpO2: 98 % (05/07/18 1115) Vital Signs (24h Range):  Temp:  [97.1 °F (36.2 °C)-98.5 °F (36.9 °C)] 97.8 °F (36.6 °C)  Pulse:  [78-92] 81  Resp:  [16-20] 18  SpO2:  [94 %-100 %] 98 %  BP: (134-160)/(68-83) 138/79     Weight: 68.8 kg (151 lb 9.1 oz)  Body  mass index is 26.02 kg/m².  Body surface area is 1.76 meters squared.      Intake/Output Summary (Last 24 hours) at 05/07/18 1142  Last data filed at 05/07/18 0400   Gross per 24 hour   Intake             2172 ml   Output              450 ml   Net             1722 ml       Physical Exam   Constitutional: She is oriented to person, place, and time. She appears well-developed and well-nourished.   HENT:   Head: Normocephalic and atraumatic.   Eyes: Conjunctivae, EOM and lids are normal.   Neck: Phonation normal. No edema present.   Cardiovascular: Normal rate, regular rhythm and normal heart sounds.    Pulmonary/Chest: Effort normal and breath sounds normal.   Abdominal: Soft. Normal appearance and bowel sounds are normal. There is no hepatosplenomegaly.   Neurological: She is alert and oriented to person, place, and time.   Skin: Skin is warm, dry and intact.   3cm area of erythema to sternoclavicular junction, mildly tender to palpation   Psychiatric: She has a normal mood and affect. Her speech is normal and behavior is normal.   Vitals reviewed.      Significant Labs:   Recent Results (from the past 24 hour(s))   Basic metabolic panel    Collection Time: 05/07/18  4:03 AM   Result Value Ref Range    Sodium 137 136 - 145 mmol/L    Potassium 3.4 (L) 3.5 - 5.1 mmol/L    Chloride 105 95 - 110 mmol/L    CO2 24 23 - 29 mmol/L    Glucose 120 (H) 70 - 110 mg/dL    BUN, Bld 4 (L) 6 - 20 mg/dL    Creatinine 0.6 0.5 - 1.4 mg/dL    Calcium 8.8 8.7 - 10.5 mg/dL    Anion Gap 8 8 - 16 mmol/L    eGFR if African American >60.0 >60 mL/min/1.73 m^2    eGFR if non African American >60.0 >60 mL/min/1.73 m^2   CBC auto differential    Collection Time: 05/07/18  4:03 AM   Result Value Ref Range    WBC 3.30 (L) 3.90 - 12.70 K/uL    RBC 2.73 (L) 4.00 - 5.40 M/uL    Hemoglobin 10.9 (L) 12.0 - 16.0 g/dL    Hematocrit 29.9 (L) 37.0 - 48.5 %     (H) 82 - 98 fL    MCH 39.9 (H) 27.0 - 31.0 pg    MCHC 36.5 (H) 32.0 - 36.0 g/dL    RDW 14.6  (H) 11.5 - 14.5 %    Platelets 168 150 - 350 K/uL    MPV 9.4 9.2 - 12.9 fL    Immature Granulocytes 0.3 0.0 - 0.5 %    Gran # (ANC) 1.3 (L) 1.8 - 7.7 K/uL    Immature Grans (Abs) 0.01 0.00 - 0.04 K/uL    Lymph # 1.2 1.0 - 4.8 K/uL    Mono # 0.4 0.3 - 1.0 K/uL    Eos # 0.3 0.0 - 0.5 K/uL    Baso # 0.04 0.00 - 0.20 K/uL    nRBC 0 0 /100 WBC    Gran% 40.3 38.0 - 73.0 %    Lymph% 36.1 18.0 - 48.0 %    Mono% 12.7 4.0 - 15.0 %    Eosinophil% 9.4 (H) 0.0 - 8.0 %    Basophil% 1.2 0.0 - 1.9 %    Differential Method Automated    Phosphorus    Collection Time: 05/07/18  4:03 AM   Result Value Ref Range    Phosphorus 2.1 (L) 2.7 - 4.5 mg/dL     Diagnostic Results:  No new imaging or procedures to review since prior assessment      Assessment/Plan:     * Radiation esophagitis    62 y.o. F w/ metastatic breast cancer who is s/p palliative XRT to C4 three days ago presents with dysphagia and odynophagia  Slowly improving with time and supportive care    -[place] solution PRN   -IV morphine 2mg q4h PRN for pain, will transition to PO medication when able to tolerate  -IVF        Carcinoma of breast metastatic to intrathoracic lymph node    metastatic carcinoma of the left breast   -currently on afinitor and examestane, after progressing on femara and palbociclib  -s/p palliative XRT to C4 3 days PTA        Hypokalemia    Secondary to poor PO intake  - replete IV prn        Hypophosphatemia    2/2 decreased po intake  -Replete IV PRN         Decreased oral intake    Decreased po intake x3 days  -normal renal function on todays labs, appears clinically euvolemic  -IVF w/ D5 1/2NS @ 100 cc/hr             Georgia Gonzales,   Hematology/Oncology  [facility]      I have reviewed the notes, assessments, and/or procedures performed by the housestaff, as above.  I have personally interviewed and examined the patient at the beside, and rounded with the housestaff. I concur with her/his assessment and plan and the  documentation of Georgia Shukla I, Dr. Hero Mcknight, personally spent more than  35 mins during this encounter, greater than 50% was spent in direct counseling and/or coordination of care.     Hero Mcknight M.D., M.S., F.A.C.P.  Hematology/Oncology Attending  [facility]

## 2018-05-08 LAB
ANION GAP SERPL CALC-SCNC: 7 MMOL/L
BASOPHILS # BLD AUTO: 0.04 K/UL
BASOPHILS NFR BLD: 1.1 %
BUN SERPL-MCNC: 3 MG/DL
CALCIUM SERPL-MCNC: 9.1 MG/DL
CHLORIDE SERPL-SCNC: 105 MMOL/L
CO2 SERPL-SCNC: 25 MMOL/L
CREAT SERPL-MCNC: 0.6 MG/DL
DIFFERENTIAL METHOD: ABNORMAL
EOSINOPHIL # BLD AUTO: 0.4 K/UL
EOSINOPHIL NFR BLD: 11.2 %
ERYTHROCYTE [DISTWIDTH] IN BLOOD BY AUTOMATED COUNT: 14.4 %
EST. GFR  (AFRICAN AMERICAN): >60 ML/MIN/1.73 M^2
EST. GFR  (NON AFRICAN AMERICAN): >60 ML/MIN/1.73 M^2
GLUCOSE SERPL-MCNC: 122 MG/DL
HCT VFR BLD AUTO: 31.2 %
HGB BLD-MCNC: 11.3 G/DL
IMM GRANULOCYTES # BLD AUTO: 0.01 K/UL
IMM GRANULOCYTES NFR BLD AUTO: 0.3 %
LYMPHOCYTES # BLD AUTO: 1.1 K/UL
LYMPHOCYTES NFR BLD: 30.8 %
MCH RBC QN AUTO: 39.5 PG
MCHC RBC AUTO-ENTMCNC: 36.2 G/DL
MCV RBC AUTO: 109 FL
MONOCYTES # BLD AUTO: 0.5 K/UL
MONOCYTES NFR BLD: 14.3 %
NEUTROPHILS # BLD AUTO: 1.5 K/UL
NEUTROPHILS NFR BLD: 42.3 %
NRBC BLD-RTO: 0 /100 WBC
PHOSPHATE SERPL-MCNC: 2.4 MG/DL
PLATELET # BLD AUTO: 162 K/UL
PMV BLD AUTO: 9.5 FL
POTASSIUM SERPL-SCNC: 3.6 MMOL/L
RBC # BLD AUTO: 2.86 M/UL
SODIUM SERPL-SCNC: 137 MMOL/L
WBC # BLD AUTO: 3.57 K/UL

## 2018-05-08 PROCEDURE — 63600175 PHARM REV CODE 636 W HCPCS: Performed by: INTERNAL MEDICINE

## 2018-05-08 PROCEDURE — 85025 COMPLETE CBC W/AUTO DIFF WBC: CPT

## 2018-05-08 PROCEDURE — 20600001 HC STEP DOWN PRIVATE ROOM

## 2018-05-08 PROCEDURE — 84100 ASSAY OF PHOSPHORUS: CPT

## 2018-05-08 PROCEDURE — 63600175 PHARM REV CODE 636 W HCPCS: Performed by: STUDENT IN AN ORGANIZED HEALTH CARE EDUCATION/TRAINING PROGRAM

## 2018-05-08 PROCEDURE — 99233 SBSQ HOSP IP/OBS HIGH 50: CPT | Mod: ,,, | Performed by: INTERNAL MEDICINE

## 2018-05-08 PROCEDURE — S5010 5% DEXTROSE AND 0.45% SALINE: HCPCS | Performed by: INTERNAL MEDICINE

## 2018-05-08 PROCEDURE — 25000003 PHARM REV CODE 250: Performed by: INTERNAL MEDICINE

## 2018-05-08 PROCEDURE — 80048 BASIC METABOLIC PNL TOTAL CA: CPT

## 2018-05-08 RX ORDER — MORPHINE SULFATE 2 MG/ML
2 INJECTION, SOLUTION INTRAMUSCULAR; INTRAVENOUS
Status: DISCONTINUED | OUTPATIENT
Start: 2018-05-08 | End: 2018-05-09 | Stop reason: HOSPADM

## 2018-05-08 RX ORDER — MORPHINE SULFATE 20 MG/ML
6 SOLUTION ORAL
Status: DISCONTINUED | OUTPATIENT
Start: 2018-05-08 | End: 2018-05-09 | Stop reason: HOSPADM

## 2018-05-08 RX ADMIN — MORPHINE SULFATE 6 MG: 100 SOLUTION ORAL at 03:05

## 2018-05-08 RX ADMIN — HYDROCORTISONE: 10 CREAM TOPICAL at 08:05

## 2018-05-08 RX ADMIN — ENOXAPARIN SODIUM 40 MG: 100 INJECTION SUBCUTANEOUS at 05:05

## 2018-05-08 RX ADMIN — MORPHINE SULFATE 2 MG: 2 INJECTION, SOLUTION INTRAMUSCULAR; INTRAVENOUS at 04:05

## 2018-05-08 RX ADMIN — MORPHINE SULFATE 6 MG: 100 SOLUTION ORAL at 11:05

## 2018-05-08 RX ADMIN — DEXTROSE AND SODIUM CHLORIDE: 5; .45 INJECTION, SOLUTION INTRAVENOUS at 08:05

## 2018-05-08 RX ADMIN — HYDROCORTISONE: 10 CREAM TOPICAL at 09:05

## 2018-05-08 RX ADMIN — MORPHINE SULFATE 6 MG: 100 SOLUTION ORAL at 06:05

## 2018-05-08 RX ADMIN — MORPHINE SULFATE 6 MG: 100 SOLUTION ORAL at 08:05

## 2018-05-08 RX ADMIN — MORPHINE SULFATE 6 MG: 100 SOLUTION ORAL at 09:05

## 2018-05-08 NOTE — ASSESSMENT & PLAN NOTE
62 y.o. F w/ metastatic breast cancer who is s/p palliative XRT to C4 three days ago presents with dysphagia and odynophagia  Slowly improving with time and supportive care  -[place] solution PRN   -Pt used 4 doses of IV morphine in the last 24 hours.  -Will start the patient on a full liquid diet today  -Will start the patient on roxanol this AM

## 2018-05-08 NOTE — SUBJECTIVE & OBJECTIVE
Interval History: The patient states she is able to swallow her saliva this morning and is willing to try a liquid diet today.  The patient still complains of throat pain.  She denies CP, SOB, N/V, constipation, diarrhea.    Oncology Treatment Plan:   [No treatment plan]    Medications:  Continuous Infusions:   dextrose 5 % and 0.45 % NaCl 100 mL/hr at 05/08/18 0817     Scheduled Meds:   enoxaparin  40 mg Subcutaneous Daily    hydrocortisone   Topical (Top) BID     PRN Meds:duke's soln (benadryl 30 mL, mylanta 30 mL, lidocane 30 mL, nystatin 30 mL) 120 mL, morphine, morphine, ramelteon, sodium chloride 0.9%     Review of Systems   Constitutional: Negative for chills and fever.   HENT: Positive for sore throat and trouble swallowing.    Respiratory: Negative for cough and shortness of breath.    Cardiovascular: Negative for chest pain and palpitations.   Gastrointestinal: Negative for abdominal pain, constipation, diarrhea, nausea and vomiting.   Skin: Negative for rash.   Neurological: Negative for headaches.     Objective:     Vital Signs (Most Recent):  Temp: 98.1 °F (36.7 °C) (05/08/18 0727)  Pulse: 77 (05/08/18 0727)  Resp: 16 (05/08/18 0727)  BP: 137/83 (05/08/18 0727)  SpO2: 97 % (05/08/18 0727) Vital Signs (24h Range):  Temp:  [97.6 °F (36.4 °C)-98.1 °F (36.7 °C)] 98.1 °F (36.7 °C)  Pulse:  [76-83] 77  Resp:  [16-20] 16  SpO2:  [94 %-98 %] 97 %  BP: (128-138)/(68-83) 137/83     Weight: 68.8 kg (151 lb 9.1 oz)  Body mass index is 26.02 kg/m².  Body surface area is 1.76 meters squared.    No intake or output data in the 24 hours ending 05/08/18 0834    Physical Exam   Constitutional: She is oriented to person, place, and time. She appears well-developed and well-nourished.   HENT:   Mild erythema on chest   Eyes: EOM are normal. Right eye exhibits no discharge. Left eye exhibits no discharge.   Cardiovascular: Normal rate, regular rhythm and normal heart sounds.  Exam reveals no gallop and no friction rub.     No murmur heard.  Pulmonary/Chest: Effort normal and breath sounds normal. No respiratory distress. She has no wheezes. She has no rales.   Abdominal: Soft. Bowel sounds are normal. She exhibits no distension. There is no tenderness. There is no rebound and no guarding.   Neurological: She is alert and oriented to person, place, and time.       Significant Labs:   Recent Results (from the past 24 hour(s))   Basic metabolic panel    Collection Time: 05/08/18  4:08 AM   Result Value Ref Range    Sodium 137 136 - 145 mmol/L    Potassium 3.6 3.5 - 5.1 mmol/L    Chloride 105 95 - 110 mmol/L    CO2 25 23 - 29 mmol/L    Glucose 122 (H) 70 - 110 mg/dL    BUN, Bld 3 (L) 6 - 20 mg/dL    Creatinine 0.6 0.5 - 1.4 mg/dL    Calcium 9.1 8.7 - 10.5 mg/dL    Anion Gap 7 (L) 8 - 16 mmol/L    eGFR if African American >60.0 >60 mL/min/1.73 m^2    eGFR if non African American >60.0 >60 mL/min/1.73 m^2   CBC auto differential    Collection Time: 05/08/18  4:08 AM   Result Value Ref Range    WBC 3.57 (L) 3.90 - 12.70 K/uL    RBC 2.86 (L) 4.00 - 5.40 M/uL    Hemoglobin 11.3 (L) 12.0 - 16.0 g/dL    Hematocrit 31.2 (L) 37.0 - 48.5 %     (H) 82 - 98 fL    MCH 39.5 (H) 27.0 - 31.0 pg    MCHC 36.2 (H) 32.0 - 36.0 g/dL    RDW 14.4 11.5 - 14.5 %    Platelets 162 150 - 350 K/uL    MPV 9.5 9.2 - 12.9 fL    Immature Granulocytes 0.3 0.0 - 0.5 %    Gran # (ANC) 1.5 (L) 1.8 - 7.7 K/uL    Immature Grans (Abs) 0.01 0.00 - 0.04 K/uL    Lymph # 1.1 1.0 - 4.8 K/uL    Mono # 0.5 0.3 - 1.0 K/uL    Eos # 0.4 0.0 - 0.5 K/uL    Baso # 0.04 0.00 - 0.20 K/uL    nRBC 0 0 /100 WBC    Gran% 42.3 38.0 - 73.0 %    Lymph% 30.8 18.0 - 48.0 %    Mono% 14.3 4.0 - 15.0 %    Eosinophil% 11.2 (H) 0.0 - 8.0 %    Basophil% 1.1 0.0 - 1.9 %    Differential Method Automated    Phosphorus    Collection Time: 05/08/18  4:08 AM   Result Value Ref Range    Phosphorus 2.4 (L) 2.7 - 4.5 mg/dL         Diagnostic Results:  I have reviewed all pertinent imaging results/findings  within the past 24 hours.

## 2018-05-08 NOTE — ASSESSMENT & PLAN NOTE
metastatic carcinoma of the left breast   -currently on afinitor and examestane, after progressing on femara and palbociclib  -s/p palliative XRT to C4 3 days prior to admission

## 2018-05-08 NOTE — PROGRESS NOTES
[facility]  Hematology/Oncology  Progress Note    Patient Name: Georgia Jeffrey  Admission Date: 5/4/2018  Hospital Length of Stay: 4 days  Code Status: Full Code     Subjective:     HPI:  Georgia Jeffrey is a 62 y.o. F patient of Dr. Whatley's with metastatic carcinoma of the left breast (currently on afinitor and examestane, after progressing on femara and palbociclib), s/p palliative XRT to C4 three days ago, who called earlier today complaining of inability to swallow for several days, and was asked to present for evaluation. Reports about      Oncologic History:  She is currently on Faslodex and palbociclib.  In addition she is on Zometa for all metastasis.     She is receiving radiation to her T 2 metastasis with Dr. Witt on the [facility]. She has had 4 treatments thus far.  She will have 10-15 treatments.  She reports her major side effect is been some severe fatigue.  She's remained capable of doing all of her normal activities but has to struggle with that.  She has some slight scratchiness in her throat which she relates to the radiation.  Her bowels have been variable.  She has no shortness of breath.       Breast  History: She developed palpable abnormality in her left breas in the early part of 2017.    Diagnostic mammography from April 18, 2017 showed an irregular mass with pleomorphic calcifications at the 9:00 area of the left breast.  By ultrasound this was solid mass measuring 36 mm.     Breast biopsy on April 19, 2017 showed infiltrating ductal carcinoma (histologic grade 3, nuclear grade 2, mitotic index 3) tumor was 99% ER positive, 91% NV positive.  HER-2 was negative by FISH.     On May 11, 2017 she underwent bilateral nipple sparing mastectomies by Dr.Karl Gutierrez and autologous breast reconstruction by  at [facility].  She developed some compromise of her left flap which was removed and an implant was placed.  That became  infected and was removed.  Several weeks ago she underwent removal of residual reconstruction from both sides.     The pathology from that surgery showed a 3 cm high grade grading ductal carcinoma (3+3+2).  There is associated lymphovascular invasion.  In addition was associated high-grade DCIS.  [place] lymph node was negative.    The right breast showed no evidence of any abnormality.  Final pathological stage TII N0 stage IIA.  Oncotype was 31.     She was seen by medical oncology and chemotherapy was discussed.      PET/CT scan was performed on June 27 which showed increased uptake in the subcarinal node measuring 9 mm an SUV of 5.9, there was increased uptake in the right hilum with an SUV of 5.9, there were multiple pulmonary nodules on the right side a right middle lobe nodule measured 16 mm with an SUV of 6.8 right lower lobe nodule 9 mm with an SUV of 2.6.  In addition there were multiple other subcentimeter nodules.  Increased uptake was seen in the right intertrochanteric femur with an SUV of 4.  (Subsequent MRI of the right femur on July 14 did not show any bony abnormality).     On July 14 she underwent bronchoscopy and endoscopic ultrasound.  Left lower lobe brush sample was positive for carcinoma and a fine-needle aspirate from a station 7 lymph node was also positive for carcinoma.  These were estrogen receptor positive.     In July 2017 she was started on systemic therapy with Faslodex and palbociclib.  In August 2017 she developed a DVT of the left lower extremity with the peroneal and posterior tibial veins exhibiting thrombosis.  She was started on apixaban at that time.  Follow-up PET scan in September  suggested bone metastasis.     Due to insurance  issues she was changed to letrozole in October 2017 and continued her palbociclib. She had significant nausea and vomiting secondary to letrozole and that was discontinued.     On December 1, 2017 she underwent MRI scanning of the thoracic and  lumbar spine.  That showed a metastasis at the left T2 lamina.  The lumbar spine was negative for malignancy Subsequent MRI scans on December 4 were negative in the cervical spine and brain were all negative for malignancy.  Chest x-ray in December 4 was clear.     She was off all therapy for about 6 weeks then restarted  faslodex and palbociclib on  12/17/17.    Interval History: The patient states she is able to swallow her saliva this morning and is willing to try a liquid diet today.  The patient still complains of throat pain.  She denies CP, SOB, N/V, constipation, diarrhea.    Oncology Treatment Plan:   [No treatment plan]    Medications:  Continuous Infusions:   dextrose 5 % and 0.45 % NaCl 100 mL/hr at 05/08/18 0817     Scheduled Meds:   enoxaparin  40 mg Subcutaneous Daily    hydrocortisone   Topical (Top) BID     PRN Meds:duke's soln (benadryl 30 mL, mylanta 30 mL, lidocane 30 mL, nystatin 30 mL) 120 mL, morphine, morphine, ramelteon, sodium chloride 0.9%     Review of Systems   Constitutional: Negative for chills and fever.   HENT: Positive for sore throat and trouble swallowing.    Respiratory: Negative for cough and shortness of breath.    Cardiovascular: Negative for chest pain and palpitations.   Gastrointestinal: Negative for abdominal pain, constipation, diarrhea, nausea and vomiting.   Skin: Negative for rash.   Neurological: Negative for headaches.     Objective:     Vital Signs (Most Recent):  Temp: 98.1 °F (36.7 °C) (05/08/18 0727)  Pulse: 77 (05/08/18 0727)  Resp: 16 (05/08/18 0727)  BP: 137/83 (05/08/18 0727)  SpO2: 97 % (05/08/18 0727) Vital Signs (24h Range):  Temp:  [97.6 °F (36.4 °C)-98.1 °F (36.7 °C)] 98.1 °F (36.7 °C)  Pulse:  [76-83] 77  Resp:  [16-20] 16  SpO2:  [94 %-98 %] 97 %  BP: (128-138)/(68-83) 137/83     Weight: 68.8 kg (151 lb 9.1 oz)  Body mass index is 26.02 kg/m².  Body surface area is 1.76 meters squared.    No intake or output data in the 24 hours ending 05/08/18  0834    Physical Exam   Constitutional: She is oriented to person, place, and time. She appears well-developed and well-nourished.   HENT:   Mild erythema on chest   Eyes: EOM are normal. Right eye exhibits no discharge. Left eye exhibits no discharge.   Cardiovascular: Normal rate, regular rhythm and normal heart sounds.  Exam reveals no gallop and no friction rub.    No murmur heard.  Pulmonary/Chest: Effort normal and breath sounds normal. No respiratory distress. She has no wheezes. She has no rales.   Abdominal: Soft. Bowel sounds are normal. She exhibits no distension. There is no tenderness. There is no rebound and no guarding.   Neurological: She is alert and oriented to person, place, and time.       Significant Labs:   Recent Results (from the past 24 hour(s))   Basic metabolic panel    Collection Time: 05/08/18  4:08 AM   Result Value Ref Range    Sodium 137 136 - 145 mmol/L    Potassium 3.6 3.5 - 5.1 mmol/L    Chloride 105 95 - 110 mmol/L    CO2 25 23 - 29 mmol/L    Glucose 122 (H) 70 - 110 mg/dL    BUN, Bld 3 (L) 6 - 20 mg/dL    Creatinine 0.6 0.5 - 1.4 mg/dL    Calcium 9.1 8.7 - 10.5 mg/dL    Anion Gap 7 (L) 8 - 16 mmol/L    eGFR if African American >60.0 >60 mL/min/1.73 m^2    eGFR if non African American >60.0 >60 mL/min/1.73 m^2   CBC auto differential    Collection Time: 05/08/18  4:08 AM   Result Value Ref Range    WBC 3.57 (L) 3.90 - 12.70 K/uL    RBC 2.86 (L) 4.00 - 5.40 M/uL    Hemoglobin 11.3 (L) 12.0 - 16.0 g/dL    Hematocrit 31.2 (L) 37.0 - 48.5 %     (H) 82 - 98 fL    MCH 39.5 (H) 27.0 - 31.0 pg    MCHC 36.2 (H) 32.0 - 36.0 g/dL    RDW 14.4 11.5 - 14.5 %    Platelets 162 150 - 350 K/uL    MPV 9.5 9.2 - 12.9 fL    Immature Granulocytes 0.3 0.0 - 0.5 %    Gran # (ANC) 1.5 (L) 1.8 - 7.7 K/uL    Immature Grans (Abs) 0.01 0.00 - 0.04 K/uL    Lymph # 1.1 1.0 - 4.8 K/uL    Mono # 0.5 0.3 - 1.0 K/uL    Eos # 0.4 0.0 - 0.5 K/uL    Baso # 0.04 0.00 - 0.20 K/uL    nRBC 0 0 /100 WBC    Gran%  42.3 38.0 - 73.0 %    Lymph% 30.8 18.0 - 48.0 %    Mono% 14.3 4.0 - 15.0 %    Eosinophil% 11.2 (H) 0.0 - 8.0 %    Basophil% 1.1 0.0 - 1.9 %    Differential Method Automated    Phosphorus    Collection Time: 05/08/18  4:08 AM   Result Value Ref Range    Phosphorus 2.4 (L) 2.7 - 4.5 mg/dL         Diagnostic Results:  I have reviewed all pertinent imaging results/findings within the past 24 hours.    Assessment/Plan:     * Radiation esophagitis    62 y.o. F w/ metastatic breast cancer who is s/p palliative XRT to C4 three days ago presents with dysphagia and odynophagia  Slowly improving with time and supportive care  -[place] solution PRN   -Pt used 4 doses of IV morphine in the last 24 hours.  -Will start the patient on a full liquid diet today  -Will start the patient on roxanol this AM          Hypokalemia    Secondary to poor PO intake  -potassium is 3.6 this AM  -Will monitor        Hypophosphatemia    2/2 decreased po intake  -Replete IV PRN         Decreased oral intake    -Will monitor response to oral morphine.  -Will monitor PO intake.  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  -Prophylaxis - lovenox  -Dispo - pending PO intake    Felipe Lacey MD  Hematology/Oncology  [facility]      I have reviewed the notes, assessments, and/or procedures performed by the housestaff, as above.  I have personally interviewed and examined the patient at the beside, and rounded with the housestaff. I concur with her/his assessment and plan and the documentation of Georgia Jeffrey.  I, Dr. Hero Mcknight, personally spent more than  35 mins during this encounter, greater than 50% was spent in direct counseling and/or coordination of care.      Hero Mcknight M.D., M.S., F.A.C.P.  Hematology/Oncology Attending  [facility]

## 2018-05-08 NOTE — PLAN OF CARE
Problem: Dysphagia (Adult)  Goal: Identify Related Risk Factors and Signs and Symptoms  Related risk factors and signs and symptoms are identified upon initiation of Human Response Clinical Practice Guideline (CPG)   Emotional support and encouragement provided. Patient encouraged to try foods according to preference.  Pt c/o of burning sensation when drinking juices. No cough present after eating. Will continue to monitor.

## 2018-05-08 NOTE — PLAN OF CARE
Problem: Patient Care Overview  Goal: Plan of Care Review  Outcome: Ongoing (interventions implemented as appropriate)  Pt is AAOx3.Pt is ambulatory and independent.Pt able to perform all ADL's without assistance. Pt is in good spirits.  Standard precautions maintained.  Pt turns independently, pt is aware of bony area and pressure reduction positions Pt remains injury and fall free, non skid footwear donned, call light within reach, personal items within reach, bed in low/locked position, pt able to voice needs all needs voiced have been met at this time.

## 2018-05-09 VITALS
DIASTOLIC BLOOD PRESSURE: 89 MMHG | SYSTOLIC BLOOD PRESSURE: 147 MMHG | TEMPERATURE: 98 F | HEIGHT: 64 IN | WEIGHT: 151.56 LBS | OXYGEN SATURATION: 97 % | BODY MASS INDEX: 25.88 KG/M2 | HEART RATE: 83 BPM | RESPIRATION RATE: 17 BRPM

## 2018-05-09 LAB
ANION GAP SERPL CALC-SCNC: 7 MMOL/L
ANISOCYTOSIS BLD QL SMEAR: SLIGHT
BASOPHILS # BLD AUTO: 0.04 K/UL
BASOPHILS NFR BLD: 1.4 %
BUN SERPL-MCNC: 4 MG/DL
CALCIUM SERPL-MCNC: 9.2 MG/DL
CHLORIDE SERPL-SCNC: 104 MMOL/L
CO2 SERPL-SCNC: 26 MMOL/L
CREAT SERPL-MCNC: 0.6 MG/DL
DIFFERENTIAL METHOD: ABNORMAL
EOSINOPHIL # BLD AUTO: 0.4 K/UL
EOSINOPHIL NFR BLD: 12.5 %
ERYTHROCYTE [DISTWIDTH] IN BLOOD BY AUTOMATED COUNT: 14.4 %
EST. GFR  (AFRICAN AMERICAN): >60 ML/MIN/1.73 M^2
EST. GFR  (NON AFRICAN AMERICAN): >60 ML/MIN/1.73 M^2
GLUCOSE SERPL-MCNC: 88 MG/DL
HCT VFR BLD AUTO: 29.1 %
HGB BLD-MCNC: 10.5 G/DL
HYPOCHROMIA BLD QL SMEAR: ABNORMAL
IMM GRANULOCYTES # BLD AUTO: 0.01 K/UL
IMM GRANULOCYTES NFR BLD AUTO: 0.4 %
LYMPHOCYTES # BLD AUTO: 1 K/UL
LYMPHOCYTES NFR BLD: 33.8 %
MCH RBC QN AUTO: 39.3 PG
MCHC RBC AUTO-ENTMCNC: 36.1 G/DL
MCV RBC AUTO: 109 FL
MONOCYTES # BLD AUTO: 0.5 K/UL
MONOCYTES NFR BLD: 18.5 %
NEUTROPHILS # BLD AUTO: 0.9 K/UL
NEUTROPHILS NFR BLD: 33.4 %
NRBC BLD-RTO: 0 /100 WBC
OVALOCYTES BLD QL SMEAR: ABNORMAL
PHOSPHATE SERPL-MCNC: 3 MG/DL
PLATELET # BLD AUTO: 157 K/UL
PMV BLD AUTO: 10.3 FL
POIKILOCYTOSIS BLD QL SMEAR: SLIGHT
POLYCHROMASIA BLD QL SMEAR: ABNORMAL
POTASSIUM SERPL-SCNC: 3.6 MMOL/L
RBC # BLD AUTO: 2.67 M/UL
SODIUM SERPL-SCNC: 137 MMOL/L
WBC # BLD AUTO: 2.81 K/UL

## 2018-05-09 PROCEDURE — 85025 COMPLETE CBC W/AUTO DIFF WBC: CPT

## 2018-05-09 PROCEDURE — 80048 BASIC METABOLIC PNL TOTAL CA: CPT

## 2018-05-09 PROCEDURE — 84100 ASSAY OF PHOSPHORUS: CPT

## 2018-05-09 PROCEDURE — 99233 SBSQ HOSP IP/OBS HIGH 50: CPT | Mod: ,,, | Performed by: INTERNAL MEDICINE

## 2018-05-09 PROCEDURE — 25000003 PHARM REV CODE 250: Performed by: INTERNAL MEDICINE

## 2018-05-09 RX ORDER — MORPHINE SULFATE 20 MG/ML
6 SOLUTION ORAL
Qty: 120 ML | Refills: 0 | Status: SHIPPED | OUTPATIENT
Start: 2018-05-09 | End: 2018-07-19

## 2018-05-09 RX ORDER — MORPHINE SULFATE ORAL SOLUTION 10 MG/5ML
6 SOLUTION ORAL
Qty: 100 ML | Refills: 0 | Status: SHIPPED | OUTPATIENT
Start: 2018-05-09

## 2018-05-09 RX ORDER — HYDROCORTISONE 1 %
CREAM (GRAM) TOPICAL 2 TIMES DAILY
Qty: 28 G | Refills: 0 | Status: SHIPPED | OUTPATIENT
Start: 2018-05-09 | End: 2018-07-19

## 2018-05-09 RX ADMIN — MORPHINE SULFATE 6 MG: 100 SOLUTION ORAL at 03:05

## 2018-05-09 RX ADMIN — HYDROCORTISONE: 10 CREAM TOPICAL at 08:05

## 2018-05-09 RX ADMIN — MORPHINE SULFATE 6 MG: 100 SOLUTION ORAL at 09:05

## 2018-05-09 RX ADMIN — MORPHINE SULFATE 6 MG: 100 SOLUTION ORAL at 06:05

## 2018-05-09 RX ADMIN — MORPHINE SULFATE 6 MG: 100 SOLUTION ORAL at 12:05

## 2018-05-09 NOTE — PLAN OF CARE
Problem: Pain, Acute (Adult)  Goal: Acceptable Pain Control/Comfort Level  Patient will demonstrate the desired outcomes by discharge/transition of care.   Outcome: Ongoing (interventions implemented as appropriate)  medicated for pain throughout night to promote rest. Pain control maintained with po pain meds.

## 2018-05-09 NOTE — PROGRESS NOTES
Road Test  Oxygen-Patient tolerating room air, no distress.  Ambulation-Patient up with no assistance.  Devices-Patient going home with no devices  Tolerating-Dental soft diet.  Elimination-Patient voiding without difficulty.  Self Care-Performs self care without assistance.  Teaching-Verbal and written discharge teaching given.     Patient tolerates room air, ambulates with no assistance, regular diet, voiding without difficulty, and is going home with no devices. Midline removed, catheter intact, dressed with dry gauze and coban. No bleeding present. Patient going home. Discharge paperwork discussed. Medications reviewed and morphine delivered to bedside. Patient has all belongings and has no questions at this time and awaiting transportation from a friend at 1715.

## 2018-05-09 NOTE — PROGRESS NOTES
Admit Assessment    Patient Identification  Georgia Jeffrey   :  1955  Admit Date:  2018  Attending Provider:  Hero Mcknight MD              Referral:   Pt was admitted to  with a diagnosis of Radiation esophagitis, and was admitted this hospital stay due to Carcinoma of breast metastatic to intrathoracic lymph node, unspecified laterality [C50.919, C77.1].   is involved was referred to the Social Work Department via (Referal).  Patient presents as a 62 y.o. year old  female.      Living Situation:    Resides at [address] [place] [id number] [facility] [address], phone: There is no home phone number on file..      Discharge Planning:  SW visit bedside to offer supportive counseling and review discharge planning needs.  Pt seen by the medical oncology team and found to be appropriate for discharge home today.  Pt resides at mentioned address with her .    is primary caregiver.  He does work full time and assists pt with her daily needs. Pt has 3 adult children and 6 grandchildren who all reside out of town. Pt denies a history of home health, she has no home medical equipment at this time.  Pt reports that have lived in the [place] area a short time she is building a net work of friends through her [employer] and other opportunities as she can for additional support.      Current or Past Agencies and Description of Services/Supplies    DME None    Home Health  No history of home health.    IV Infusion n/a    Nutrition: Oral  Outpatient Pharmacy:     [facility] [id number] [place] [address] & [address] [address]  Phone: [phone] Fax: [phone]      Patient Preference of agencies include To be determined as appropriate.    Patient/Caregiver informed of right to choose providers or agencies.  Patient provides permission to release any necessary information to  [facility] and to [facility] agencies as needed to facilitate patient care, treatment planning, and patient discharge planning.  Written and verbal resources provided.      Coping  Appropriate.  is primary caregiver.   Pt is very spiritual and maintains a strong farrah.  She attends Denominational, prays, reads her Bible and takes walks regularly.  She is building a network of friends.  Discussed supportive services offered at [facility].        Adjustment to Diagnosis and Treatment  Ongoing process.      Emotional/Behavioral/Cognitive Issues  No deficits noted during SW visit.    History/Current Symptoms of Anxiety/Depression: No: Pt denies.  History/Current Substance Use:   Social History     Social History Main Topics    Smoking status: Never Smoker    Smokeless tobacco: Never Used    Alcohol use Yes      Comment: rarely    Drug use: No    Sexual activity: Yes     Partners: Male       Indications of Abuse/Neglect: No:   Abuse Screen  Do You Feel Unsafe at Home, Work or School?: no    Financial:  Payor/Plan Subscr  Sex Relation Sub. Ins. ID Effective Group Num   1. [employer] - TOBY* JANET MCLEAN A* 1955 Female  [id number] 17 [id number]                                   [address] [address]   2. [employer]* JANET MCLEAN A* 1955 Female Self [id number]                                    [address]          Other identified concerns/needs:  Role of SWer discussed, business card including contact information for SWer provided.  Pt encouraged to phone if questions/needs/concerns.      Plan:    Interventions/Referrals: No other identified needs at this time.  Pt is discharging home in the care of her .  She plans to have a ride home around 2 pm today.    Patient/caregiver engaged in treatment planning process.     providing psychosocial and supportive counseling, resources, education, assistance and discharge planning as appropriate.  Patient/caregiver state understanding of social  worker available resources,  following, remains available.

## 2018-05-09 NOTE — PLAN OF CARE
Pain issues with swallowing and certain foods. Will discuss changing diet to mechanical soft with MD's today - no N&V - medicated for pain to promote rest tonight. Pain controlled with oral pain medications - resting without difficulty

## 2018-05-10 NOTE — ASSESSMENT & PLAN NOTE
62 y.o. F w/ metastatic breast cancer who is s/p palliative XRT to C4 three days ago presents with dysphagia and odynophagia  Slowly improving with time and supportive care  -[place] solution PRN       Tolerating soft diet  Discharge home with roxanol

## 2018-05-10 NOTE — DISCHARGE SUMMARY
[facility]  Hematology/Oncology  Discharge Summary      Patient Name: Georgia Jeffrey  MRN: [id number]  Admission Date: 5/4/2018  Hospital Length of Stay: 5 days  Discharge Date and Time:  05/09/2018 7:03 PM  Attending Physician: Rasheeda att. providers found   Discharging Provider: Georgia Gonzales DO  Primary Care Provider: Primary Doctor Rasheeda    HPI: Georgia Jeffrey is a 62 y.o. F patient of Dr. Whatley's with metastatic carcinoma of the left breast (currently on afinitor and examestane, after progressing on femara and palbociclib), s/p palliative XRT to C4 three days ago, who called earlier today complaining of inability to swallow for several days, and was asked to present for evaluation. Reports about      Oncologic History:  She is currently on Faslodex and palbociclib.  In addition she is on Zometa for all metastasis.     She is receiving radiation to her T 2 metastasis with Dr. Witt on the [facility]. She has had 4 treatments thus far.  She will have 10-15 treatments.  She reports her major side effect is been some severe fatigue.  She's remained capable of doing all of her normal activities but has to struggle with that.  She has some slight scratchiness in her throat which she relates to the radiation.  Her bowels have been variable.  She has no shortness of breath.       Breast  History: She developed palpable abnormality in her left breas in the early part of 2017.    Diagnostic mammography from April 18, 2017 showed an irregular mass with pleomorphic calcifications at the 9:00 area of the left breast.  By ultrasound this was solid mass measuring 36 mm.     Breast biopsy on April 19, 2017 showed infiltrating ductal carcinoma (histologic grade 3, nuclear grade 2, mitotic index 3) tumor was 99% ER positive, 91% RI positive.  HER-2 was negative by FISH.     On May 11, 2017 she underwent bilateral nipple sparing mastectomies by Dr.Karl Gutierrez and autologous breast  reconstruction by  at [facility].  She developed some compromise of her left flap which was removed and an implant was placed.  That became infected and was removed.  Several weeks ago she underwent removal of residual reconstruction from both sides.     The pathology from that surgery showed a 3 cm high grade grading ductal carcinoma (3+3+2).  There is associated lymphovascular invasion.  In addition was associated high-grade DCIS.  [place] lymph node was negative.    The right breast showed no evidence of any abnormality.  Final pathological stage TII N0 stage IIA.  Oncotype was 31.     She was seen by medical oncology and chemotherapy was discussed.      PET/CT scan was performed on June 27 which showed increased uptake in the subcarinal node measuring 9 mm an SUV of 5.9, there was increased uptake in the right hilum with an SUV of 5.9, there were multiple pulmonary nodules on the right side a right middle lobe nodule measured 16 mm with an SUV of 6.8 right lower lobe nodule 9 mm with an SUV of 2.6.  In addition there were multiple other subcentimeter nodules.  Increased uptake was seen in the right intertrochanteric femur with an SUV of 4.  (Subsequent MRI of the right femur on July 14 did not show any bony abnormality).     On July 14 she underwent bronchoscopy and endoscopic ultrasound.  Left lower lobe brush sample was positive for carcinoma and a fine-needle aspirate from a station 7 lymph node was also positive for carcinoma.  These were estrogen receptor positive.     In July 2017 she was started on systemic therapy with Faslodex and palbociclib.  In August 2017 she developed a DVT of the left lower extremity with the peroneal and posterior tibial veins exhibiting thrombosis.  She was started on apixaban at that time.  Follow-up PET scan in September  suggested bone metastasis.     Due to insurance  issues she was changed to letrozole in October 2017 and continued her  palbociclib. She had significant nausea and vomiting secondary to letrozole and that was discontinued.     On December 1, 2017 she underwent MRI scanning of the thoracic and lumbar spine.  That showed a metastasis at the left T2 lamina.  The lumbar spine was negative for malignancy Subsequent MRI scans on December 4 were negative in the cervical spine and brain were all negative for malignancy.  Chest x-ray in December 4 was clear.     She was off all therapy for about 6 weeks then restarted  faslodex and palbociclib on  12/17/17.    * No surgery found *     Hospital Course: Ms. Jeffrey was admitted to medical oncology on 5/4 for dysphagia secondary to radiation esophagitis. She was treated with supportive care including IVF and electrolyte replacement. On 5/9, she was tolerating soft foods, and was discharged home.     Consults:   Consults         Status Ordering Provider     Inpatient consult to PICC team (MARTHA)  Once     Provider:  (Not yet assigned)    Completed TUYET ACUÑA          Significant Diagnostic Studies: Labs:   CMP   Recent Labs  Lab 05/08/18  0408 05/09/18  0327    137   K 3.6 3.6    104   CO2 25 26   * 88   BUN 3* 4*   CREATININE 0.6 0.6   CALCIUM 9.1 9.2   ANIONGAP 7* 7*   ESTGFRAFRICA >60.0 >60.0   EGFRNONAA >60.0 >60.0    and CBC   Recent Labs  Lab 05/08/18  0408 05/09/18  0327   WBC 3.57* 2.81*   HGB 11.3* 10.5*   HCT 31.2* 29.1*    157       Pending Diagnostic Studies:     None        Final Active Diagnoses:    Diagnosis Date Noted POA    PRINCIPAL PROBLEM:  Radiation esophagitis [K20.8] 05/04/2018 Yes    Carcinoma of breast metastatic to intrathoracic lymph node [C50.919, C77.1] 05/04/2018 Yes    Hypokalemia [E87.6] 05/07/2018 Yes    Hypophosphatemia [E83.39] 05/06/2018 Yes    Decreased oral intake [R63.8] 05/04/2018 Yes      Problems Resolved During this Admission:    Diagnosis Date Noted Date Resolved POA      Discharged Condition: good    Disposition: Home  or Self Care    Follow Up:  Follow-up Information     Gabriel Whatley MD.    Specialties:  Hematology and Oncology, Hematology  Why:  as scheduled by clinic  Contact information:  [address]  [facility] [id number] [phone]                 Patient Instructions:     Diet Adult Regular       Medications:  Reconciled Home Medications:      Medication List      START taking these medications    DUKE'S SOLUTION  Take 10 mLs by mouth 4 (four) times daily as needed (throat discomfort).     hydrocortisone 1 % cream  Apply topically 2 (two) times daily.     magic mouthwash diphen/antac/lidoc/nysta  Swish and swallow 10 ml by mouth 4 times a day as needed     * morphine 100 mg/5 mL (20 mg/mL) concentrated solution  Take 0.3 mLs (6 mg total) by mouth every 3 (three) hours as needed for Pain.     * morphine 10 mg/5 mL solution  Take 3 mLs (6 mg total) by mouth every 3 (three) hours as needed for Pain.        * This list has 2 medication(s) that are the same as other medications prescribed for you. Read the directions carefully, and ask your doctor or other care provider to review them with you.            CONTINUE taking these medications    cyclobenzaprine 10 MG tablet  Commonly known as:  FLEXERIL  Take 1 tablet (10 mg total) by mouth 3 (three) times daily as needed for Muscle spasms.     dexamethasone 0.5 mg/5 mL Elix  Commonly known as:  DECADRON  2 teaspoons swish for 2 minutes and spit 4 times daily- to prevent mouth sores from Afinitor     dronabinol 5 MG capsule  Commonly known as:  MARINOL  Take 1 capsule (5 mg total) by mouth 2 (two) times daily before meals.     everolimus (antineoplastic) 10 mg Tab  Commonly known as:  AFINITOR  Take 1 tablet (10 mg total) by mouth once daily.     exemestane 25 mg tablet  Commonly known as:  AROMASIN  Take 1 tablet (25 mg total) by mouth once daily.     ondansetron 8 MG tablet  Commonly known as:  ZOFRAN  Take 1 tablet (8 mg total) by mouth every 8 (eight) hours as needed  for Nausea.     prochlorperazine 10 MG tablet  Commonly known as:  COMPAZINE  Take 1 tablet (10 mg total) by mouth every 6 (six) hours as needed.     traMADol 50 mg tablet  Commonly known as:  ULTRAM  TK 2 TS PO Q 4 H PRN P        STOP taking these medications    promethazine 25 MG tablet  Commonly known as:  PHENERGAN            Georgia Gonzales, DO  Hematology/Oncology  [facility] [address] [facility]        I have reviewed the notes, assessments, and/or procedures performed by the housestaff, as above.  I have personally interviewed and examined the patient at the beside, and rounded with the housestaff. I concur with her/his assessment and plan and the documentation of Georgia Jeffrey.  I, Dr. Hero Mcknight, personally spent more than 35 mins during this encounter, greater than 50% was spent in direct counseling and/or coordination of care.     Hero Mcknight M.D., M.S., F.A.C.P.  Hematology/Oncology Attending  [facility]

## 2018-05-11 ENCOUNTER — TELEPHONE (OUTPATIENT)
Dept: PHARMACY | Facility: CLINIC | Age: 63
End: 2018-05-11

## 2018-05-11 NOTE — TELEPHONE ENCOUNTER
Patient was discharged from hospital late on 5/9 and has returned home. She states that she is slowly feeling better. She says that she will restart her Afinitor next monday. Patient only took about 3 days worth of medication and has plenty on hand. Will follow up with patient about a week after restarting her Afinitor to see how she is doing.

## 2018-05-21 ENCOUNTER — PATIENT MESSAGE (OUTPATIENT)
Dept: HEMATOLOGY/ONCOLOGY | Facility: CLINIC | Age: 63
End: 2018-05-21

## 2018-05-22 ENCOUNTER — TELEPHONE (OUTPATIENT)
Dept: PHARMACY | Facility: CLINIC | Age: 63
End: 2018-05-22

## 2018-05-22 NOTE — TELEPHONE ENCOUNTER
Patient reports that she has been tolerating Afinitor well with no missed doses.  Her skin is dry and burnt on her face and she has had daily fluctuations of constipation and diarrhea.  She reports that she has read about medication extensively and so far side effects have been manageable.  Advised her to keep log of side effects to discuss with provider at appointments.

## 2018-05-23 NOTE — PROGRESS NOTES
Subjective:       Patient ID: Georgia Jeffrey is a 62 y.o. female.    Chief Complaint: No chief complaint on file.    HPI Ms. Jeffrey is a 62-year-old female seen for metastatic carcinoma of the left breast.       She is currently on exemestane and Afinitor or which was started in April when her PET scan showed progression of disease.  She is also on Zometa for bone protection.   She completed radiation to her T 2 metastasis with Dr. Witt on the [facility] at the end of April. .     She presented to the clinic on May 4th with mucositis and difficulty swallowing.  She was admitted to the hospital for IV hydration and was discharged on May 9th.    Subsequently she reports that she has been tolerating her Afinitor or well. She does have some increase and GI irregularity with constipation or diarrhea.  She also has some aching in her arms.  Her back pain is improved since she completed her radiation.  She has noted a bulge in her right lower abdomen.    Breast  History: She developed palpable abnormality in her left breas in the early part of 2017.    Diagnostic mammography from April 18, 2017 showed an irregular mass with pleomorphic calcifications at the 9:00 area of the left breast.  By ultrasound this was solid mass measuring 36 mm.     Breast biopsy on April 19, 2017 showed infiltrating ductal carcinoma (histologic grade 3, nuclear grade 2, mitotic index 3) tumor was 99% ER positive, 91% OR positive.  HER-2 was negative by FISH.     On May 11, 2017 she underwent bilateral nipple sparing mastectomies by Dr.Karl Gutierrez and autologous breast reconstruction by  at [facility].  She developed some compromise of her left flap which was removed and an implant was placed.  That became infected and was removed.  Several weeks ago she underwent removal of residual reconstruction from both sides.     The pathology from that surgery showed a 3 cm high grade grading ductal carcinoma  (3+3+2).  There is associated lymphovascular invasion.  In addition was associated high-grade DCIS.  [place] lymph node was negative.    The right breast showed no evidence of any abnormality.  Final pathological stage TII N0 stage IIA.  Oncotype was 31.     She was seen by medical oncology and chemotherapy was discussed.      PET/CT scan was performed on June 27 which showed increased uptake in the subcarinal node measuring 9 mm an SUV of 5.9, there was increased uptake in the right hilum with an SUV of 5.9, there were multiple pulmonary nodules on the right side a right middle lobe nodule measured 16 mm with an SUV of 6.8 right lower lobe nodule 9 mm with an SUV of 2.6.  In addition there were multiple other subcentimeter nodules.  Increased uptake was seen in the right intertrochanteric femur with an SUV of 4.  (Subsequent MRI of the right femur on July 14 did not show any bony abnormality).     On July 14 she underwent bronchoscopy and endoscopic ultrasound.  Left lower lobe brush sample was positive for carcinoma and a fine-needle aspirate from a station 7 lymph node was also positive for carcinoma.  These were estrogen receptor positive.     In July 2017 she was started on systemic therapy with Faslodex and palbociclib.  In August 2017 she developed a DVT of the left lower extremity with the peroneal and posterior tibial veins exhibiting thrombosis.  She was started on apixaban at that time.  Follow-up PET scan in September  suggested bone metastasis.     Due to insurance  issues she was changed to letrozole in October 2017 and continued her palbociclib. She had significant nausea and vomiting secondary to letrozole and that was discontinued.        On December 1, 2017 she underwent MRI scanning of the thoracic and lumbar spine.  That showed a metastasis at the left T2 lamina.  The lumbar spine was negative for malignancy Subsequent MRI scans on December 4 were negative in the cervical spine and brain were  all negative for malignancy.  Chest x-ray in December 4 was clear.     She was off all therapy for about 6 weeks then restarted  faslodex and palbociclib on  12/17/17.     On January 9, 2018 she began Zometa therapy.  Review of Systems   Constitutional: Positive for appetite change. Negative for unexpected weight change.   Eyes: Positive for visual disturbance.   Respiratory: Negative for cough and shortness of breath.    Cardiovascular: Negative for chest pain.   Gastrointestinal: Positive for diarrhea. Negative for abdominal pain.   Genitourinary: Negative for frequency.   Musculoskeletal: Positive for back pain.   Skin: Negative for rash.   Neurological: Positive for headaches.   Hematological: Negative for adenopathy.   Psychiatric/Behavioral: The patient is nervous/anxious.        Objective:      Physical Exam   Constitutional: She is oriented to person, place, and time. She appears well-developed and well-nourished.   HENT:   Mouth/Throat: No oropharyngeal exudate.   Eyes: No scleral icterus.   Cardiovascular: Normal rate and regular rhythm.    Pulmonary/Chest: Effort normal and breath sounds normal. She has no wheezes. She has no rales.   Abdominal: Soft. She exhibits no mass. There is no tenderness.   No palpable abnormality in the right lower quadrant, possible abdominal wall weakness   Lymphadenopathy:     She has no cervical adenopathy.     She has axillary adenopathy.        Left axillary: Pectoral adenopathy: 1-2  cm         Right: No supraclavicular adenopathy present.        Left: No supraclavicular adenopathy present.   Neurological: She is alert and oriented to person, place, and time.   Psychiatric: She has a normal mood and affect. Her behavior is normal. Thought content normal.   Vitals reviewed.      Assessment:      CBC is normal  1. Malignant neoplasm of central portion of left breast in female, estrogen receptor positive    2. Carcinoma of breast metastatic to bone, unspecified laterality         Plan:        Continue current therapy.  Zometa every 3 months.  Return in 1 month.   Distress Screening Results: Psychosocial Distress screening score of Distress Score: 3 noted and reviewed. No intervention indicated.

## 2018-05-24 ENCOUNTER — OFFICE VISIT (OUTPATIENT)
Dept: HEMATOLOGY/ONCOLOGY | Facility: CLINIC | Age: 63
End: 2018-05-24
Attending: INTERNAL MEDICINE
Payer: COMMERCIAL

## 2018-05-24 ENCOUNTER — TELEPHONE (OUTPATIENT)
Dept: INFUSION THERAPY | Facility: HOSPITAL | Age: 63
End: 2018-05-24

## 2018-05-24 VITALS
TEMPERATURE: 98 F | RESPIRATION RATE: 17 BRPM | SYSTOLIC BLOOD PRESSURE: 161 MMHG | DIASTOLIC BLOOD PRESSURE: 84 MMHG | BODY MASS INDEX: 25.35 KG/M2 | WEIGHT: 147.69 LBS | HEART RATE: 87 BPM

## 2018-05-24 DIAGNOSIS — C50.112 MALIGNANT NEOPLASM OF CENTRAL PORTION OF LEFT BREAST IN FEMALE, ESTROGEN RECEPTOR POSITIVE: Primary | ICD-10-CM

## 2018-05-24 DIAGNOSIS — C79.51 CARCINOMA OF BREAST METASTATIC TO BONE, UNSPECIFIED LATERALITY: ICD-10-CM

## 2018-05-24 DIAGNOSIS — Z17.0 MALIGNANT NEOPLASM OF CENTRAL PORTION OF LEFT BREAST IN FEMALE, ESTROGEN RECEPTOR POSITIVE: Primary | ICD-10-CM

## 2018-05-24 DIAGNOSIS — C50.919 CARCINOMA OF BREAST METASTATIC TO BONE, UNSPECIFIED LATERALITY: ICD-10-CM

## 2018-05-24 PROCEDURE — 99999 PR PBB SHADOW E&M-EST. PATIENT-LVL III: CPT | Mod: PBBFAC,,, | Performed by: INTERNAL MEDICINE

## 2018-05-24 PROCEDURE — 3008F BODY MASS INDEX DOCD: CPT | Mod: CPTII,S$GLB,, | Performed by: INTERNAL MEDICINE

## 2018-05-24 PROCEDURE — 99214 OFFICE O/P EST MOD 30 MIN: CPT | Mod: S$GLB,,, | Performed by: INTERNAL MEDICINE

## 2018-05-24 RX ORDER — HEPARIN 100 UNIT/ML
500 SYRINGE INTRAVENOUS
Status: CANCELLED | OUTPATIENT
Start: 2018-05-29

## 2018-05-24 RX ORDER — SODIUM CHLORIDE 0.9 % (FLUSH) 0.9 %
10 SYRINGE (ML) INJECTION
Status: CANCELLED | OUTPATIENT
Start: 2018-05-29

## 2018-05-24 NOTE — TELEPHONE ENCOUNTER
[5/24/2018 9:17 AM] Pamela Walters:   good morning, i have a patient that is scheduled for Zometa today   she is needing to be rescheduled to 5/29 at anytime please   MRN [id number]  [5/24/2018 9:20 AM] Evelyn Caba:   mrn [id number] zometa 05/29/18 @3:30  [5/24/2018 9:20 AM] Pamela Walters:   thank you!!

## 2018-05-29 ENCOUNTER — INFUSION (OUTPATIENT)
Dept: INFUSION THERAPY | Facility: HOSPITAL | Age: 63
End: 2018-05-29
Attending: INTERNAL MEDICINE
Payer: COMMERCIAL

## 2018-05-29 ENCOUNTER — TELEPHONE (OUTPATIENT)
Dept: PHARMACY | Facility: CLINIC | Age: 63
End: 2018-05-29

## 2018-05-29 VITALS
SYSTOLIC BLOOD PRESSURE: 129 MMHG | RESPIRATION RATE: 16 BRPM | TEMPERATURE: 98 F | HEART RATE: 82 BPM | WEIGHT: 152.63 LBS | BODY MASS INDEX: 26.06 KG/M2 | HEIGHT: 64 IN | DIASTOLIC BLOOD PRESSURE: 83 MMHG

## 2018-05-29 DIAGNOSIS — C79.51 CARCINOMA OF LEFT BREAST METASTATIC TO BONE: Primary | ICD-10-CM

## 2018-05-29 DIAGNOSIS — C50.912 CARCINOMA OF LEFT BREAST METASTATIC TO BONE: Primary | ICD-10-CM

## 2018-05-29 PROCEDURE — 25000003 PHARM REV CODE 250: Mod: PN | Performed by: INTERNAL MEDICINE

## 2018-05-29 PROCEDURE — 63600175 PHARM REV CODE 636 W HCPCS: Mod: PN | Performed by: INTERNAL MEDICINE

## 2018-05-29 PROCEDURE — 96365 THER/PROPH/DIAG IV INF INIT: CPT | Mod: PN

## 2018-05-29 PROCEDURE — A4216 STERILE WATER/SALINE, 10 ML: HCPCS | Mod: PN | Performed by: INTERNAL MEDICINE

## 2018-05-29 RX ORDER — ZOLEDRONIC ACID 0.04 MG/ML
4 INJECTION, SOLUTION INTRAVENOUS
Status: COMPLETED | OUTPATIENT
Start: 2018-05-29 | End: 2018-05-29

## 2018-05-29 RX ORDER — SODIUM CHLORIDE 0.9 % (FLUSH) 0.9 %
10 SYRINGE (ML) INJECTION
Status: DISCONTINUED | OUTPATIENT
Start: 2018-05-29 | End: 2018-05-29 | Stop reason: HOSPADM

## 2018-05-29 RX ADMIN — SODIUM CHLORIDE, PRESERVATIVE FREE 10 ML: 5 INJECTION INTRAVENOUS at 03:05

## 2018-05-29 RX ADMIN — SODIUM CHLORIDE 500 ML: 0.9 INJECTION, SOLUTION INTRAVENOUS at 03:05

## 2018-05-29 RX ADMIN — ZOLEDRONIC ACID 4 MG: 0.04 INJECTION, SOLUTION INTRAVENOUS at 04:05

## 2018-06-05 ENCOUNTER — TELEPHONE (OUTPATIENT)
Dept: HEMATOLOGY/ONCOLOGY | Facility: CLINIC | Age: 63
End: 2018-06-05

## 2018-06-05 NOTE — TELEPHONE ENCOUNTER
Patient would like to speak to you about a clinical trial that she has heard about and did some research on. Please contact when you can. thanks

## 2018-06-07 ENCOUNTER — PATIENT MESSAGE (OUTPATIENT)
Dept: HEMATOLOGY/ONCOLOGY | Facility: CLINIC | Age: 63
End: 2018-06-07

## 2018-06-19 ENCOUNTER — PATIENT MESSAGE (OUTPATIENT)
Dept: HEMATOLOGY/ONCOLOGY | Facility: CLINIC | Age: 63
End: 2018-06-19

## 2018-06-20 ENCOUNTER — TELEPHONE (OUTPATIENT)
Dept: PHARMACY | Facility: CLINIC | Age: 63
End: 2018-06-20

## 2018-06-20 ENCOUNTER — LAB VISIT (OUTPATIENT)
Dept: LAB | Facility: HOSPITAL | Age: 63
End: 2018-06-20
Attending: INTERNAL MEDICINE
Payer: COMMERCIAL

## 2018-06-20 DIAGNOSIS — C50.112 MALIGNANT NEOPLASM OF CENTRAL PORTION OF LEFT BREAST IN FEMALE, ESTROGEN RECEPTOR POSITIVE: ICD-10-CM

## 2018-06-20 DIAGNOSIS — C50.112 MALIGNANT NEOPLASM OF CENTRAL PORTION OF LEFT BREAST IN FEMALE, ESTROGEN RECEPTOR POSITIVE: Primary | ICD-10-CM

## 2018-06-20 DIAGNOSIS — Z17.0 MALIGNANT NEOPLASM OF CENTRAL PORTION OF LEFT BREAST IN FEMALE, ESTROGEN RECEPTOR POSITIVE: ICD-10-CM

## 2018-06-20 DIAGNOSIS — Z17.0 MALIGNANT NEOPLASM OF CENTRAL PORTION OF LEFT BREAST IN FEMALE, ESTROGEN RECEPTOR POSITIVE: Primary | ICD-10-CM

## 2018-06-20 LAB
ALBUMIN SERPL BCP-MCNC: 4.5 G/DL
ALP SERPL-CCNC: 65 U/L
ALT SERPL W/O P-5'-P-CCNC: 50 U/L
ANION GAP SERPL CALC-SCNC: 12 MMOL/L
AST SERPL-CCNC: 33 U/L
BASOPHILS # BLD AUTO: 0.01 K/UL
BASOPHILS NFR BLD: 0.2 %
BILIRUB SERPL-MCNC: 0.5 MG/DL
BUN SERPL-MCNC: 7 MG/DL
CALCIUM SERPL-MCNC: 9.6 MG/DL
CHLORIDE SERPL-SCNC: 104 MMOL/L
CO2 SERPL-SCNC: 28 MMOL/L
CREAT SERPL-MCNC: 0.58 MG/DL
DIFFERENTIAL METHOD: ABNORMAL
EOSINOPHIL # BLD AUTO: 0.2 K/UL
EOSINOPHIL NFR BLD: 5.1 %
ERYTHROCYTE [DISTWIDTH] IN BLOOD BY AUTOMATED COUNT: 13.8 %
EST. GFR  (AFRICAN AMERICAN): >60 ML/MIN/1.73 M^2
EST. GFR  (NON AFRICAN AMERICAN): >60 ML/MIN/1.73 M^2
GLUCOSE SERPL-MCNC: 115 MG/DL
HCT VFR BLD AUTO: 38.5 %
HCV AB SERPL QL IA: NEGATIVE
HGB BLD-MCNC: 12.8 G/DL
LYMPHOCYTES # BLD AUTO: 1.4 K/UL
LYMPHOCYTES NFR BLD: 31.9 %
MCH RBC QN AUTO: 34.1 PG
MCHC RBC AUTO-ENTMCNC: 33.2 G/DL
MCV RBC AUTO: 103 FL
MONOCYTES # BLD AUTO: 0.5 K/UL
MONOCYTES NFR BLD: 11.6 %
NEUTROPHILS # BLD AUTO: 2.2 K/UL
NEUTROPHILS NFR BLD: 51.2 %
NRBC BLD-RTO: 0 /100 WBC
PLATELET # BLD AUTO: 183 K/UL
PMV BLD AUTO: 9.4 FL
POTASSIUM SERPL-SCNC: 4.6 MMOL/L
PROT SERPL-MCNC: 8.1 G/DL
RBC # BLD AUTO: 3.75 M/UL
SODIUM SERPL-SCNC: 144 MMOL/L
WBC # BLD AUTO: 4.32 K/UL

## 2018-06-20 PROCEDURE — 86803 HEPATITIS C AB TEST: CPT | Mod: PN

## 2018-06-20 PROCEDURE — 86703 HIV-1/HIV-2 1 RESULT ANTBDY: CPT

## 2018-06-20 PROCEDURE — 85025 COMPLETE CBC W/AUTO DIFF WBC: CPT

## 2018-06-20 PROCEDURE — 80053 COMPREHEN METABOLIC PANEL: CPT | Mod: PN

## 2018-06-20 PROCEDURE — 36415 COLL VENOUS BLD VENIPUNCTURE: CPT | Mod: PN

## 2018-06-20 PROCEDURE — 86706 HEP B SURFACE ANTIBODY: CPT

## 2018-06-20 PROCEDURE — 85025 COMPLETE CBC W/AUTO DIFF WBC: CPT | Mod: PN

## 2018-06-20 PROCEDURE — 80053 COMPREHEN METABOLIC PANEL: CPT

## 2018-06-20 PROCEDURE — 86803 HEPATITIS C AB TEST: CPT

## 2018-06-20 PROCEDURE — 86300 IMMUNOASSAY TUMOR CA 15-3: CPT

## 2018-06-20 NOTE — PROGRESS NOTES
Subjective:       Patient ID: Georgia Jeffrey is a 62 y.o. female.    Chief Complaint: No chief complaint on file.    HPI Ms. Jeffrey is a 62-year-old female seen for metastatic carcinoma of the left breast.       She is currently on exemestane and Afinitor.    She has had some GI irregularities with occasional diarrhea alternating with constipation.  Appetite been somewhat decreased.  She has some occasional nausea.  Fatigue slightly better.  She has had 3 bladder infections over the last 6 weeks and use some leftover antibiotics for that.    She has had some irritation swelling the eyelids of both eyes.  She does some mild back symptoms but is not taking any medication for that.  She is having some dry skin for which she uses Eucerin cream and Aquaphor.  She has some significant emotional lability.    Breast  History: She developed palpable abnormality in her left breas in the early part of 2017.    Diagnostic mammography from April 18, 2017 showed an irregular mass with pleomorphic calcifications at the 9:00 area of the left breast.  By ultrasound this was solid mass measuring 36 mm.     Breast biopsy on April 19, 2017 showed infiltrating ductal carcinoma (histologic grade 3, nuclear grade 2, mitotic index 3) tumor was 99% ER positive, 91% [place] positive.  HER-2 was negative by FISH.     On May 11, 2017 she underwent bilateral nipple sparing mastectomies by Dr.Karl Gutierrez and autologous breast reconstruction by  at [facility].  She developed some compromise of her left flap which was removed and an implant was placed.  That became infected and was removed.  Several weeks ago she underwent removal of residual reconstruction from both sides.     The pathology from that surgery showed a 3 cm high grade grading ductal carcinoma (3+3+2).  There is associated lymphovascular invasion.  In addition was associated high-grade DCIS.  [place] lymph node was negative.    The right breast showed no  evidence of any abnormality.  Final pathological stage TII N0 stage IIA.  Oncotype was 31.     She was seen by medical oncology and chemotherapy was discussed.      PET/CT scan was performed on June 27 which showed increased uptake in the subcarinal node measuring 9 mm an SUV of 5.9, there was increased uptake in the right hilum with an SUV of 5.9, there were multiple pulmonary nodules on the right side a right middle lobe nodule measured 16 mm with an SUV of 6.8 right lower lobe nodule 9 mm with an SUV of 2.6.  In addition there were multiple other subcentimeter nodules.  Increased uptake was seen in the right intertrochanteric femur with an SUV of 4.  (Subsequent MRI of the right femur on July 14 did not show any bony abnormality).     On July 14 she underwent bronchoscopy and endoscopic ultrasound.  Left lower lobe brush sample was positive for carcinoma and a fine-needle aspirate from a station 7 lymph node was also positive for carcinoma.  These were estrogen receptor positive.     In July 2017 she was started on systemic therapy with Faslodex and palbociclib.  In August 2017 she developed a DVT of the left lower extremity with the peroneal and posterior tibial veins exhibiting thrombosis.  She was started on apixaban at that time.  Follow-up PET scan in September  suggested bone metastasis.     Due to insurance  issues she was changed to letrozole in October 2017 and continued her palbociclib. She had significant nausea and vomiting secondary to letrozole and that was discontinued.        On December 1, 2017 she underwent MRI scanning of the thoracic and lumbar spine.  That showed a metastasis at the left T2 lamina.  The lumbar spine was negative for malignancy Subsequent MRI scans on December 4 were negative in the cervical spine and brain were all negative for malignancy.  Chest x-ray in December 4 was clear.     She was off all therapy for about 6 weeks then restarted  faslodex and palbociclib on   12/17/17.     On January 9, 2018 she began Zometa therapy.    In April 2018 she began exemestane and everolimus.     She completed radiation to her T 2 metastasis with Dr. Witt on the [facility] at the end of April  Review of Systems   Constitutional: Positive for appetite change. Negative for unexpected weight change.   Eyes: Positive for visual disturbance.   Respiratory: Negative for cough and shortness of breath.    Cardiovascular: Negative for chest pain.   Gastrointestinal: Positive for diarrhea. Negative for abdominal pain.   Genitourinary: Negative for frequency.   Musculoskeletal: Positive for back pain.   Skin: Negative for rash.   Neurological: Positive for headaches.   Hematological: Negative for adenopathy.   Psychiatric/Behavioral: The patient is nervous/anxious.        Objective:      Physical Exam   Constitutional: She is oriented to person, place, and time. She appears well-developed and well-nourished.   HENT:   Mouth/Throat: No oropharyngeal exudate.   Eyes: No scleral icterus.   Cardiovascular: Normal rate and regular rhythm.    Pulmonary/Chest: Effort normal and breath sounds normal. She has no wheezes. She has no rales.   Abdominal: Soft. She exhibits no mass. There is no tenderness.   No palpable abnormality in the right lower quadrant, possible abdominal wall weakness   Lymphadenopathy:     She has no cervical adenopathy.     She has no axillary adenopathy.        Right: No supraclavicular adenopathy present.        Left: No supraclavicular adenopathy present.   Neurological: She is alert and oriented to person, place, and time.   Psychiatric: She has a normal mood and affect. Her behavior is normal. Thought content normal.   Vitals reviewed.      Assessment:      CBC is normal  1. Malignant neoplasm of central portion of left breast in female, estrogen receptor positive    2. 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 Carcinoma of breast metastatic  to intrathoracic lymph node, unspecified laterality        Plan:      trial of antidepressant - CELEXA.  Tobradex for her eye symptoms.  Check urine culture.  Appt  in Psychology.  Continue current therapy.  Zometa every 3 months.  Return in 1 month.    Per her request we are currently working to have her evaluated for possible clinical trial participation-cell directed therapy at the [facility].

## 2018-06-21 ENCOUNTER — OFFICE VISIT (OUTPATIENT)
Dept: HEMATOLOGY/ONCOLOGY | Facility: CLINIC | Age: 63
End: 2018-06-21
Payer: COMMERCIAL

## 2018-06-21 ENCOUNTER — TELEPHONE (OUTPATIENT)
Dept: INFUSION THERAPY | Facility: HOSPITAL | Age: 63
End: 2018-06-21

## 2018-06-21 VITALS
DIASTOLIC BLOOD PRESSURE: 80 MMHG | BODY MASS INDEX: 25.51 KG/M2 | HEART RATE: 72 BPM | OXYGEN SATURATION: 99 % | SYSTOLIC BLOOD PRESSURE: 130 MMHG | WEIGHT: 148.56 LBS | RESPIRATION RATE: 18 BRPM | TEMPERATURE: 98 F

## 2018-06-21 DIAGNOSIS — C50.112 MALIGNANT NEOPLASM OF CENTRAL PORTION OF LEFT BREAST IN FEMALE, ESTROGEN RECEPTOR POSITIVE: Primary | ICD-10-CM

## 2018-06-21 DIAGNOSIS — Z17.0 MALIGNANT NEOPLASM OF CENTRAL PORTION OF LEFT BREAST IN FEMALE, ESTROGEN RECEPTOR POSITIVE: Primary | ICD-10-CM

## 2018-06-21 DIAGNOSIS — C77.1 CARCINOMA OF BREAST METASTATIC TO INTRATHORACIC LYMPH NODE, UNSPECIFIED LATERALITY: ICD-10-CM

## 2018-06-21 DIAGNOSIS — C50.919 CARCINOMA OF BREAST METASTATIC TO BONE, UNSPECIFIED LATERALITY: ICD-10-CM

## 2018-06-21 DIAGNOSIS — R30.0 DYSURIA: ICD-10-CM

## 2018-06-21 DIAGNOSIS — C50.919 CARCINOMA OF BREAST METASTATIC TO INTRATHORACIC LYMPH NODE, UNSPECIFIED LATERALITY: ICD-10-CM

## 2018-06-21 DIAGNOSIS — C79.51 CARCINOMA OF BREAST METASTATIC TO BONE, UNSPECIFIED LATERALITY: ICD-10-CM

## 2018-06-21 LAB
HBV SURFACE AB SER-ACNC: NEGATIVE M[IU]/ML
HIV 1+2 AB+HIV1 P24 AG SERPL QL IA: NEGATIVE

## 2018-06-21 PROCEDURE — 87077 CULTURE AEROBIC IDENTIFY: CPT

## 2018-06-21 PROCEDURE — 87186 SC STD MICRODIL/AGAR DIL: CPT

## 2018-06-21 PROCEDURE — 87088 URINE BACTERIA CULTURE: CPT

## 2018-06-21 PROCEDURE — 99999 PR PBB SHADOW E&M-EST. PATIENT-LVL IV: CPT | Mod: PBBFAC,,, | Performed by: INTERNAL MEDICINE

## 2018-06-21 PROCEDURE — 3008F BODY MASS INDEX DOCD: CPT | Mod: CPTII,S$GLB,, | Performed by: INTERNAL MEDICINE

## 2018-06-21 PROCEDURE — 87086 URINE CULTURE/COLONY COUNT: CPT

## 2018-06-21 PROCEDURE — 99215 OFFICE O/P EST HI 40 MIN: CPT | Mod: S$GLB,,, | Performed by: INTERNAL MEDICINE

## 2018-06-21 RX ORDER — DEXAMETHASONE 0.5 MG/5ML
SOLUTION ORAL
COMMUNITY
Start: 2018-04-26 | End: 2018-11-27

## 2018-06-21 RX ORDER — TOBRAMYCIN AND DEXAMETHASONE 3; 1 MG/ML; MG/ML
1 SUSPENSION/ DROPS OPHTHALMIC EVERY 6 HOURS
Qty: 5 ML | Refills: 3 | Status: SHIPPED | OUTPATIENT
Start: 2018-06-21

## 2018-06-21 RX ORDER — CITALOPRAM 20 MG/1
20 TABLET, FILM COATED ORAL DAILY
Qty: 30 TABLET | Refills: 2 | Status: SHIPPED | OUTPATIENT
Start: 2018-06-21 | End: 2018-07-19 | Stop reason: SDUPTHER

## 2018-06-21 RX ORDER — EXEMESTANE 25 MG/1
TABLET ORAL
Refills: 11 | COMMUNITY
Start: 2018-06-04 | End: 2018-11-27

## 2018-06-22 LAB — CANCER AG27-29 SERPL-ACNC: 38.9 U/ML

## 2018-06-23 LAB — BACTERIA UR CULT: NORMAL

## 2018-06-25 DIAGNOSIS — N30.90 CYSTITIS: Primary | ICD-10-CM

## 2018-06-25 RX ORDER — NITROFURANTOIN 25; 75 MG/1; MG/1
100 CAPSULE ORAL 2 TIMES DAILY
Qty: 14 CAPSULE | Refills: 0 | Status: SHIPPED | OUTPATIENT
Start: 2018-06-25 | End: 2018-07-02

## 2018-06-27 ENCOUNTER — TELEPHONE (OUTPATIENT)
Dept: INFUSION THERAPY | Facility: HOSPITAL | Age: 63
End: 2018-06-27

## 2018-07-09 ENCOUNTER — PATIENT MESSAGE (OUTPATIENT)
Dept: HEMATOLOGY/ONCOLOGY | Facility: CLINIC | Age: 63
End: 2018-07-09

## 2018-07-17 ENCOUNTER — HOSPITAL ENCOUNTER (OUTPATIENT)
Dept: RADIOLOGY | Facility: HOSPITAL | Age: 63
Discharge: HOME | End: 2018-07-17
Attending: INTERNAL MEDICINE
Payer: COMMERCIAL

## 2018-07-17 DIAGNOSIS — C50.112 MALIGNANT NEOPLASM OF CENTRAL PORTION OF LEFT BREAST IN FEMALE, ESTROGEN RECEPTOR POSITIVE: ICD-10-CM

## 2018-07-17 DIAGNOSIS — Z17.0 MALIGNANT NEOPLASM OF CENTRAL PORTION OF LEFT BREAST IN FEMALE, ESTROGEN RECEPTOR POSITIVE: ICD-10-CM

## 2018-07-17 PROCEDURE — A9552 F18 FDG: HCPCS | Mod: PO

## 2018-07-17 PROCEDURE — 78815 PET IMAGE W/CT SKULL-THIGH: CPT | Mod: 26,PS,, | Performed by: RADIOLOGY

## 2018-07-17 PROCEDURE — 78815 PET IMAGE W/CT SKULL-THIGH: CPT | Mod: TC,PO

## 2018-07-18 ENCOUNTER — TELEPHONE (OUTPATIENT)
Dept: PHARMACY | Facility: CLINIC | Age: 63
End: 2018-07-18

## 2018-07-18 NOTE — TELEPHONE ENCOUNTER
Patient called and said that she will be leaving town from 8/16 to 8/29 and is worried that she will run out of medication. She has about 8 tablets remaining for this month. I set up shipment for her to receive her Afinitor on 7/20 and that we will reach out to her on 8/10 for her next refill so that she does not miss her doses. Discussed with her to reach out to us on 8/13 if she does not have her medication set up before then. Medication list reviewed; no new meds, allergies or health conditions. Address confirmed. Patient is doing well on the medication with some minor side effects that she states are manageable at this time. She states that she has some nausea (she does have zofran on file that she takes when needed), diarrhea (patient was advised to use Imodium as directed if needed); dry skin (uses moisturizer daily), and dry mouth. She states that the medication is working and based on the latest scans, patient has not progressed so she wants to continue on the medication. She is aware to reach out to [facility] if she has any questions or concerns.

## 2018-07-19 ENCOUNTER — TELEPHONE (OUTPATIENT)
Dept: HEMATOLOGY/ONCOLOGY | Facility: CLINIC | Age: 63
End: 2018-07-19

## 2018-07-19 ENCOUNTER — OFFICE VISIT (OUTPATIENT)
Dept: HEMATOLOGY/ONCOLOGY | Facility: CLINIC | Age: 63
End: 2018-07-19
Payer: COMMERCIAL

## 2018-07-19 VITALS
BODY MASS INDEX: 24.96 KG/M2 | WEIGHT: 146.19 LBS | RESPIRATION RATE: 16 BRPM | DIASTOLIC BLOOD PRESSURE: 81 MMHG | HEIGHT: 64 IN | SYSTOLIC BLOOD PRESSURE: 130 MMHG | HEART RATE: 83 BPM | TEMPERATURE: 98 F | OXYGEN SATURATION: 97 %

## 2018-07-19 DIAGNOSIS — F32.9 REACTIVE DEPRESSION: ICD-10-CM

## 2018-07-19 DIAGNOSIS — C79.51 CARCINOMA OF LEFT BREAST METASTATIC TO BONE: ICD-10-CM

## 2018-07-19 DIAGNOSIS — C50.112 MALIGNANT NEOPLASM OF CENTRAL PORTION OF LEFT BREAST IN FEMALE, ESTROGEN RECEPTOR POSITIVE: Primary | ICD-10-CM

## 2018-07-19 DIAGNOSIS — C77.1: ICD-10-CM

## 2018-07-19 DIAGNOSIS — C50.912: ICD-10-CM

## 2018-07-19 DIAGNOSIS — C50.912 CARCINOMA OF LEFT BREAST METASTATIC TO BONE: ICD-10-CM

## 2018-07-19 DIAGNOSIS — Z17.0 MALIGNANT NEOPLASM OF CENTRAL PORTION OF LEFT BREAST IN FEMALE, ESTROGEN RECEPTOR POSITIVE: Primary | ICD-10-CM

## 2018-07-19 PROCEDURE — 99214 OFFICE O/P EST MOD 30 MIN: CPT | Mod: S$GLB,,, | Performed by: INTERNAL MEDICINE

## 2018-07-19 PROCEDURE — 3008F BODY MASS INDEX DOCD: CPT | Mod: CPTII,S$GLB,, | Performed by: INTERNAL MEDICINE

## 2018-07-19 PROCEDURE — 99999 PR PBB SHADOW E&M-EST. PATIENT-LVL III: CPT | Mod: PBBFAC,,, | Performed by: INTERNAL MEDICINE

## 2018-07-19 RX ORDER — CITALOPRAM 10 MG/1
10 TABLET ORAL DAILY
Qty: 30 TABLET | Refills: 2 | Status: SHIPPED | OUTPATIENT
Start: 2018-07-19 | End: 2018-12-10 | Stop reason: SDUPTHER

## 2018-07-19 RX ORDER — CITALOPRAM 20 MG/1
20 TABLET, FILM COATED ORAL DAILY
Qty: 30 TABLET | Refills: 2 | Status: SHIPPED | OUTPATIENT
Start: 2018-07-19 | End: 2018-12-10 | Stop reason: SDUPTHER

## 2018-07-19 NOTE — PROGRESS NOTES
Subjective:       Patient ID: Georgia Jeffrey is a 63 y.o. female.    Chief Complaint: No chief complaint on file.    HPI Ms. Jeffrey is a 62-year-old female seen for metastatic carcinoma of the left breast.       She is currently on exemestane and Afinitor.    Celexa started last month which has helped her.  She is having significant stress and would like to try increasing her dose.  Her only other complaint is some left periorbital headache which has been fairly consistent over the last few weeks.  It sometimes wakes her from sleep.  She continues to have some diarrhea.  Appetite somewhat decreased.    Breast  History: She developed palpable abnormality in her left breas in the early part of 2017.    Diagnostic mammography from April 18, 2017 showed an irregular mass with pleomorphic calcifications at the 9:00 area of the left breast.  By ultrasound this was solid mass measuring 36 mm.     Breast biopsy on April 19, 2017 showed infiltrating ductal carcinoma (histologic grade 3, nuclear grade 2, mitotic index 3) tumor was 99% ER positive, 91% NM positive.  HER-2 was negative by FISH.     On May 11, 2017 she underwent bilateral nipple sparing mastectomies by Dr.Karl Gutierrez and autologous breast reconstruction by  at [facility].  She developed some compromise of her left flap which was removed and an implant was placed.  That became infected and was removed.  Several weeks ago she underwent removal of residual reconstruction from both sides.     The pathology from that surgery showed a 3 cm high grade grading ductal carcinoma (3+3+2).  There is associated lymphovascular invasion.  In addition was associated high-grade DCIS.  [place] lymph node was negative.    The right breast showed no evidence of any abnormality.  Final pathological stage TII N0 stage IIA.  Oncotype was 31.     She was seen by medical oncology and chemotherapy was discussed.      PET/CT scan was performed on June 27  which showed increased uptake in the subcarinal node measuring 9 mm an SUV of 5.9, there was increased uptake in the right hilum with an SUV of 5.9, there were multiple pulmonary nodules on the right side a right middle lobe nodule measured 16 mm with an SUV of 6.8 right lower lobe nodule 9 mm with an SUV of 2.6.  In addition there were multiple other subcentimeter nodules.  Increased uptake was seen in the right intertrochanteric femur with an SUV of 4.  (Subsequent MRI of the right femur on July 14 did not show any bony abnormality).     On July 14 she underwent bronchoscopy and endoscopic ultrasound.  Left lower lobe brush sample was positive for carcinoma and a fine-needle aspirate from a station 7 lymph node was also positive for carcinoma.  These were estrogen receptor positive.     In July 2017 she was started on systemic therapy with Faslodex and palbociclib.  In August 2017 she developed a DVT of the left lower extremity with the peroneal and posterior tibial veins exhibiting thrombosis.  She was started on apixaban at that time.  Follow-up PET scan in September  suggested bone metastasis.     Due to insurance  issues she was changed to letrozole in October 2017 and continued her palbociclib. She had significant nausea and vomiting secondary to letrozole and that was discontinued.        On December 1, 2017 she underwent MRI scanning of the thoracic and lumbar spine.  That showed a metastasis at the left T2 lamina.  The lumbar spine was negative for malignancy Subsequent MRI scans on December 4 were negative in the cervical spine and brain were all negative for malignancy.  Chest x-ray in December 4 was clear.     She was off all therapy for about 6 weeks then restarted  faslodex and palbociclib on  12/17/17.     On January 9, 2018 she began Zometa therapy.    In April 2018 she began exemestane and everolimus.     She completed radiation to her T 2 metastasis with Dr. Witt on the [facility]  [place] at the end of April  Review of Systems   Constitutional: Positive for appetite change (Decreased). Negative for unexpected weight change.   Eyes: Positive for visual disturbance.   Respiratory: Negative for cough and shortness of breath.    Cardiovascular: Negative for chest pain.   Gastrointestinal: Positive for diarrhea. Negative for abdominal pain.   Genitourinary: Negative for frequency.   Musculoskeletal: Positive for back pain.   Skin: Negative for rash.   Neurological: Positive for headaches ( left periorbital).   Hematological: Negative for adenopathy.   Psychiatric/Behavioral: Positive for dysphoric mood ( improved). The patient is nervous/anxious.        Objective:      Physical Exam   Constitutional: She is oriented to person, place, and time. She appears well-developed and well-nourished.   HENT:   Mouth/Throat: No oropharyngeal exudate.   Eyes: No scleral icterus.   Cardiovascular: Normal rate and regular rhythm.    Pulmonary/Chest: Effort normal and breath sounds normal. She has no wheezes. She has no rales.   Abdominal: Soft. She exhibits no mass. There is no tenderness.   No palpable abnormality in the right lower quadrant, possible abdominal wall weakness   Lymphadenopathy:     She has no cervical adenopathy.     She has no axillary adenopathy.        Right: No supraclavicular adenopathy present.        Left: No supraclavicular adenopathy present.   Neurological: She is alert and oriented to person, place, and time.   Psychiatric: She has a normal mood and affect. Her behavior is normal. Thought content normal.   Vitals reviewed.      Assessment:      CBC and CMP are normal, CA 27.29 is 38.6.    PET scan - Overall improvement in metastatic disease since the prior study indicated by disappearance of left supraclavicular lymphadenopathy and decreased hypermetabolism of several osseous metastatic lesions.  1. Malignant neoplasm of central portion of left breast in female, estrogen receptor  positive    2. 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 Carcinoma of left breast metastatic to intrathoracic lymph node        Plan:      Incrase Celexa to 30 mg.    MRI brain    Continue current therapy.    Zometa every 3 months - due next month.  Return in 1 month.    Distress Screening Results: Psychosocial Distress screening score of Distress Score: 5 noted and reviewed. No intervention indicated.

## 2018-07-19 NOTE — Clinical Note
MRI of the brain on the [place] next available See me in 1 month with CBC, CMP and CA 27.29 Zometa at next visit

## 2018-07-19 NOTE — TELEPHONE ENCOUNTER
----- Message from Gabriel Whatley MD sent at 7/19/2018  1:06 PM CDT -----  MRI of the brain on the [place] next available  See me in 1 month with CBC, CMP and CA 27.29  Zometa at next visit

## 2018-07-25 ENCOUNTER — PATIENT MESSAGE (OUTPATIENT)
Dept: HEMATOLOGY/ONCOLOGY | Facility: CLINIC | Age: 63
End: 2018-07-25

## 2018-07-25 DIAGNOSIS — N30.90 CYSTITIS: Primary | ICD-10-CM

## 2018-07-25 RX ORDER — NITROFURANTOIN (MACROCRYSTALS) 100 MG/1
100 CAPSULE ORAL 2 TIMES DAILY
Qty: 20 CAPSULE | Refills: 0 | Status: SHIPPED | OUTPATIENT
Start: 2018-07-25

## 2018-08-06 NOTE — TELEPHONE ENCOUNTER
Patient called about Afinitor refill, RTS til 8/8/18. She inquired about magic mouthwash Rx as well - she asked to have it transferred to her local [facility]. [place], PharmD AT [facility] will transfer to [facility]. Patient verbalized understanding. [facility] will reach out to patient on 8/8/18 when Afinitor payable - patient is going on vacation. TTN

## 2018-08-08 ENCOUNTER — TELEPHONE (OUTPATIENT)
Dept: PHARMACY | Facility: CLINIC | Age: 63
End: 2018-08-08

## 2018-08-16 ENCOUNTER — PATIENT MESSAGE (OUTPATIENT)
Dept: HEMATOLOGY/ONCOLOGY | Facility: CLINIC | Age: 63
End: 2018-08-16

## 2018-08-22 DIAGNOSIS — R11.0 CHRONIC NAUSEA: ICD-10-CM

## 2018-08-22 RX ORDER — DRONABINOL 5 MG/1
CAPSULE ORAL
Qty: 60 CAPSULE | Refills: 0 | Status: SHIPPED | OUTPATIENT
Start: 2018-08-22 | End: 2018-12-10 | Stop reason: SDUPTHER

## 2018-08-29 ENCOUNTER — TELEPHONE (OUTPATIENT)
Dept: PHARMACY | Facility: CLINIC | Age: 63
End: 2018-08-29

## 2018-08-29 NOTE — TELEPHONE ENCOUNTER
Patient called to schedule Afinitor refill. Confirmed two patient identifiers - name + . She is currently in [place] and has only 12 tablets remaining. She was informed that [facility] cannot ship to her in [place]. She states that her  is home and [facility] can coordinate delivery to him and he can make sure the medication gets to her. She denies any missed doses. She mentions dry skin and nose that occasionally bleeds, easy bruising, and difficulty with speech. She is seeing her doctor as soon as she comes home and will address these issues with her doctor. She confirms no changes to health, allergies, and medications. She expects changes with her COBRA insurance soon, and she understands to contact [facility] ASAP if there are any changes to her plan. All questions answered to patient's satisfaction. [facility] will ship Afinitor out on 18 to arrive at patients home on 18. $0 copay (004). Address confirmed - no signature requirement requested. [facility] will continue to reach out to patient monthly for refills. HUMBERTO

## 2018-09-04 ENCOUNTER — PATIENT MESSAGE (OUTPATIENT)
Dept: HEMATOLOGY/ONCOLOGY | Facility: CLINIC | Age: 63
End: 2018-09-04

## 2018-09-04 DIAGNOSIS — T45.1X5A CHEMOTHERAPY INDUCED DIARRHEA: Primary | ICD-10-CM

## 2018-09-04 DIAGNOSIS — K52.1 CHEMOTHERAPY INDUCED DIARRHEA: Primary | ICD-10-CM

## 2018-09-04 RX ORDER — DIPHENOXYLATE HYDROCHLORIDE AND ATROPINE SULFATE 2.5; .025 MG/1; MG/1
1 TABLET ORAL 4 TIMES DAILY PRN
Qty: 40 TABLET | Refills: 2 | Status: SHIPPED | OUTPATIENT
Start: 2018-09-04 | End: 2018-09-14

## 2018-09-10 ENCOUNTER — TELEPHONE (OUTPATIENT)
Dept: HEMATOLOGY/ONCOLOGY | Facility: CLINIC | Age: 63
End: 2018-09-10

## 2018-09-10 NOTE — TELEPHONE ENCOUNTER
----- Message from Lorelei Teresa sent at 9/10/2018  7:39 AM CDT -----  Contact: Pt  Pt is requesting a callback from office says she is really sick and may not be able to make appt today and need to speak with someone from office immediately    Pt can be reached at [phone]    Thanks

## 2018-09-10 NOTE — TELEPHONE ENCOUNTER
Contacted pt to check on status. Per pt not feeling well enough to attend appointment today, plan to postpone to end of week to allow recuperation. Per pt she is suffering a cold and chest congestion, just returning from a vacation. Advised patient that if she is feeling bad enough she should attend for further evaluation, but patient lives on [address]. Recommended a visit to Urgent care near her given her symptoms. Pt to consider. Discussed with patient re-establishing appointment to Friday 9/14 at 930 [place] Friday afternoon. Pt verbalized confirmation.   Will notify Dr. Whatley of changes.

## 2018-09-13 ENCOUNTER — LAB VISIT (OUTPATIENT)
Dept: LAB | Facility: HOSPITAL | Age: 63
End: 2018-09-13
Attending: INTERNAL MEDICINE
Payer: COMMERCIAL

## 2018-09-13 DIAGNOSIS — C50.112 MALIGNANT NEOPLASM OF CENTRAL PORTION OF LEFT BREAST IN FEMALE, ESTROGEN RECEPTOR POSITIVE: ICD-10-CM

## 2018-09-13 DIAGNOSIS — Z17.0 MALIGNANT NEOPLASM OF CENTRAL PORTION OF LEFT BREAST IN FEMALE, ESTROGEN RECEPTOR POSITIVE: ICD-10-CM

## 2018-09-13 LAB
ALBUMIN SERPL BCP-MCNC: 4 G/DL
ALP SERPL-CCNC: 63 U/L
ALT SERPL W/O P-5'-P-CCNC: 25 U/L
ANION GAP SERPL CALC-SCNC: 8 MMOL/L
AST SERPL-CCNC: 28 U/L
BASOPHILS # BLD AUTO: 0.02 K/UL
BASOPHILS NFR BLD: 0.5 %
BILIRUB SERPL-MCNC: 0.5 MG/DL
BUN SERPL-MCNC: 12 MG/DL
CALCIUM SERPL-MCNC: 9.3 MG/DL
CHLORIDE SERPL-SCNC: 102 MMOL/L
CO2 SERPL-SCNC: 29 MMOL/L
CREAT SERPL-MCNC: 0.62 MG/DL
DIFFERENTIAL METHOD: ABNORMAL
EOSINOPHIL # BLD AUTO: 0.2 K/UL
EOSINOPHIL NFR BLD: 5.8 %
ERYTHROCYTE [DISTWIDTH] IN BLOOD BY AUTOMATED COUNT: 16 %
EST. GFR  (AFRICAN AMERICAN): >60 ML/MIN/1.73 M^2
EST. GFR  (NON AFRICAN AMERICAN): >60 ML/MIN/1.73 M^2
GLUCOSE SERPL-MCNC: 123 MG/DL
HCT VFR BLD AUTO: 35.7 %
HGB BLD-MCNC: 11.6 G/DL
LYMPHOCYTES # BLD AUTO: 1.1 K/UL
LYMPHOCYTES NFR BLD: 28.1 %
MCH RBC QN AUTO: 31.1 PG
MCHC RBC AUTO-ENTMCNC: 32.5 G/DL
MCV RBC AUTO: 96 FL
MONOCYTES # BLD AUTO: 0.4 K/UL
MONOCYTES NFR BLD: 10.6 %
NEUTROPHILS # BLD AUTO: 2.1 K/UL
NEUTROPHILS NFR BLD: 55 %
NRBC BLD-RTO: 1 /100 WBC
PLATELET # BLD AUTO: 173 K/UL
PMV BLD AUTO: 9.9 FL
POTASSIUM SERPL-SCNC: 4.7 MMOL/L
PROT SERPL-MCNC: 7.6 G/DL
RBC # BLD AUTO: 3.73 M/UL
SODIUM SERPL-SCNC: 139 MMOL/L
WBC # BLD AUTO: 3.77 K/UL

## 2018-09-13 PROCEDURE — 85025 COMPLETE CBC W/AUTO DIFF WBC: CPT | Mod: PN

## 2018-09-13 PROCEDURE — 86300 IMMUNOASSAY TUMOR CA 15-3: CPT

## 2018-09-13 PROCEDURE — 80053 COMPREHEN METABOLIC PANEL: CPT | Mod: PN

## 2018-09-13 PROCEDURE — 80053 COMPREHEN METABOLIC PANEL: CPT

## 2018-09-13 PROCEDURE — 36415 COLL VENOUS BLD VENIPUNCTURE: CPT | Mod: PN

## 2018-09-13 PROCEDURE — 85025 COMPLETE CBC W/AUTO DIFF WBC: CPT

## 2018-09-13 NOTE — PROGRESS NOTES
Subjective:       Patient ID: Georgia Jeffrey is a 63 y.o. female.    Chief Complaint: No chief complaint on file.    HPI Ms. Jeffrey is a 62-year-old female seen for metastatic carcinoma of the left breast.       She is currently on exemestane and Afinitor.    Since her last visit she went on a trip back home to [place] apparently developed a significant URI.  She had fever with cough and congestion.  In addition, she had some nausea.  She has had no more fever and was feeling better earlier this week but now is starting to feel worse again.  She has not taken any antibiotics.  Appetite been down somewhat.  Her activities been reduced as well.  She continues to have intermittent diarrhea which has started since she has been on the Afinitor.  She only stop that for 2 days when she was sick earlier this month.  In addition, she has had some return of her upper back pain.    She is considering breast reconstruction and has met with  who discussed combination autologous and implant reconstruction.      She is on Celexa 30 mg      Breast  History: She developed palpable abnormality in her left breas in the early part of 2017.    Diagnostic mammography from April 18, 2017 showed an irregular mass with pleomorphic calcifications at the 9:00 area of the left breast.  By ultrasound this was solid mass measuring 36 mm.     Breast biopsy on April 19, 2017 showed infiltrating ductal carcinoma (histologic grade 3, nuclear grade 2, mitotic index 3) tumor was 99% ER positive, 91% LA positive.  HER-2 was negative by FISH.     On May 11, 2017 she underwent bilateral nipple sparing mastectomies by Dr.Karl Gutierrez and autologous breast reconstruction by  at [facility].  She developed some compromise of her left flap which was removed and an implant was placed.  That became infected and was removed.  Several weeks ago she underwent removal of residual reconstruction from both sides.     The  pathology from that surgery showed a 3 cm high grade grading ductal carcinoma (3+3+2).  There is associated lymphovascular invasion.  In addition was associated high-grade DCIS.  [place] lymph node was negative.    The right breast showed no evidence of any abnormality.  Final pathological stage TII N0 stage IIA.  Oncotype was 31.     She was seen by medical oncology and chemotherapy was discussed.      PET/CT scan was performed on June 27 which showed increased uptake in the subcarinal node measuring 9 mm an SUV of 5.9, there was increased uptake in the right hilum with an SUV of 5.9, there were multiple pulmonary nodules on the right side a right middle lobe nodule measured 16 mm with an SUV of 6.8 right lower lobe nodule 9 mm with an SUV of 2.6.  In addition there were multiple other subcentimeter nodules.  Increased uptake was seen in the right intertrochanteric femur with an SUV of 4.  (Subsequent MRI of the right femur on July 14 did not show any bony abnormality).     On July 14 she underwent bronchoscopy and endoscopic ultrasound.  Left lower lobe brush sample was positive for carcinoma and a fine-needle aspirate from a station 7 lymph node was also positive for carcinoma.  These were estrogen receptor positive.     In July 2017 she was started on systemic therapy with Faslodex and palbociclib.  In August 2017 she developed a DVT of the left lower extremity with the peroneal and posterior tibial veins exhibiting thrombosis.  She was started on apixaban at that time.  Follow-up PET scan in September  suggested bone metastasis.     Due to insurance  issues she was changed to letrozole in October 2017 and continued her palbociclib. She had significant nausea and vomiting secondary to letrozole and that was discontinued.        On December 1, 2017 she underwent MRI scanning of the thoracic and lumbar spine.  That showed a metastasis at the left T2 lamina.  The lumbar spine was negative for malignancy Subsequent  MRI scans on December 4 were negative in the cervical spine and brain were all negative for malignancy.  Chest x-ray in December 4 was clear.     She was off all therapy for about 6 weeks then restarted  faslodex and palbociclib on  12/17/17.     On January 9, 2018 she began Zometa therapy.    In April 2018 she began exemestane and everolimus.     She completed radiation to her T 2 metastasis with Dr. Witt on the [facility] at the end of April    In August 2018 PET scan - Overall improvement in metastatic disease since the prior study indicated by disappearance of left supraclavicular lymphadenopathy and decreased hypermetabolism of several osseous metastatic lesions.  Review of Systems   Constitutional: Positive for appetite change (Decreased). Negative for unexpected weight change.   HENT: Positive for congestion and postnasal drip.    Eyes: Positive for visual disturbance.   Respiratory: Positive for cough. Negative for shortness of breath.    Cardiovascular: Negative for chest pain.   Gastrointestinal: Positive for diarrhea. Negative for abdominal pain.   Genitourinary: Negative for frequency.   Musculoskeletal: Positive for back pain.   Skin: Negative for rash.   Neurological: Positive for weakness and headaches (Due to coughing).   Hematological: Negative for adenopathy.   Psychiatric/Behavioral: Positive for dysphoric mood. The patient is nervous/anxious.        Objective:      Physical Exam   Constitutional: She is oriented to person, place, and time. She appears well-developed and well-nourished.   HENT:   Mouth/Throat: No oropharyngeal exudate.   Eyes: No scleral icterus.   Cardiovascular: Normal rate and regular rhythm.   Pulmonary/Chest: Effort normal. She has wheezes ( right base). She has no rales.   Abdominal: Soft. She exhibits no mass. There is no tenderness.   No palpable abnormality in the right lower quadrant, possible abdominal wall weakness   Lymphadenopathy:     She has no  cervical adenopathy.     She has no axillary adenopathy.        Right: No supraclavicular adenopathy present.        Left: No supraclavicular adenopathy present.   Neurological: She is alert and oriented to person, place, and time.   Skin: Rash (Scattered erythematous rash across the trunk) noted.   Psychiatric: She has a normal mood and affect. Her behavior is normal. Thought content normal.   Vitals reviewed.      Assessment:      CBC shows hemoglobin 11.6, white blood cell count 3770 with 55% granulocytes, platelets [id number].  Metabolic profile shows normal hepatic and renal function.      1. Malignant neoplasm of central portion of left breast in female, estrogen receptor positive    2. Carcinoma of breast metastatic to bone, unspecified laterality        Plan:        Chest x-ray today-negative for pneumonia.    Doxycycline for bronchitis.  Continue current therapy.    Zometa every 3 months - due today.  Return in 1 month.    I told her that in terms of her reconstruction I favor the simplest approach possible, although it is unclear whether implant reconstruction alone is feasible.

## 2018-09-14 ENCOUNTER — INFUSION (OUTPATIENT)
Dept: INFUSION THERAPY | Facility: HOSPITAL | Age: 63
End: 2018-09-14
Attending: INTERNAL MEDICINE
Payer: COMMERCIAL

## 2018-09-14 ENCOUNTER — OFFICE VISIT (OUTPATIENT)
Dept: HEMATOLOGY/ONCOLOGY | Facility: CLINIC | Age: 63
End: 2018-09-14
Payer: COMMERCIAL

## 2018-09-14 ENCOUNTER — HOSPITAL ENCOUNTER (OUTPATIENT)
Dept: RADIOLOGY | Facility: HOSPITAL | Age: 63
Discharge: HOME | End: 2018-09-14
Attending: INTERNAL MEDICINE
Payer: COMMERCIAL

## 2018-09-14 VITALS
DIASTOLIC BLOOD PRESSURE: 70 MMHG | HEART RATE: 71 BPM | BODY MASS INDEX: 25.5 KG/M2 | WEIGHT: 143.94 LBS | TEMPERATURE: 98 F | HEIGHT: 63 IN | RESPIRATION RATE: 16 BRPM | SYSTOLIC BLOOD PRESSURE: 117 MMHG | OXYGEN SATURATION: 99 %

## 2018-09-14 VITALS
SYSTOLIC BLOOD PRESSURE: 140 MMHG | BODY MASS INDEX: 25.5 KG/M2 | OXYGEN SATURATION: 99 % | TEMPERATURE: 98 F | RESPIRATION RATE: 18 BRPM | DIASTOLIC BLOOD PRESSURE: 75 MMHG | HEIGHT: 63 IN | HEART RATE: 81 BPM | WEIGHT: 143.94 LBS

## 2018-09-14 DIAGNOSIS — C50.112 MALIGNANT NEOPLASM OF CENTRAL PORTION OF LEFT BREAST IN FEMALE, ESTROGEN RECEPTOR POSITIVE: Primary | ICD-10-CM

## 2018-09-14 DIAGNOSIS — Z17.0 MALIGNANT NEOPLASM OF CENTRAL PORTION OF LEFT BREAST IN FEMALE, ESTROGEN RECEPTOR POSITIVE: Primary | ICD-10-CM

## 2018-09-14 DIAGNOSIS — C50.912 CARCINOMA OF LEFT BREAST METASTATIC TO BONE: Primary | ICD-10-CM

## 2018-09-14 DIAGNOSIS — R05.9 COUGH: ICD-10-CM

## 2018-09-14 DIAGNOSIS — C50.919 CARCINOMA OF BREAST METASTATIC TO BONE, UNSPECIFIED LATERALITY: ICD-10-CM

## 2018-09-14 DIAGNOSIS — C79.51 CARCINOMA OF BREAST METASTATIC TO BONE, UNSPECIFIED LATERALITY: ICD-10-CM

## 2018-09-14 DIAGNOSIS — C79.51 CARCINOMA OF LEFT BREAST METASTATIC TO BONE: Primary | ICD-10-CM

## 2018-09-14 LAB — CANCER AG27-29 SERPL-ACNC: 40.5 U/ML

## 2018-09-14 PROCEDURE — 25000003 PHARM REV CODE 250: Mod: PN | Performed by: INTERNAL MEDICINE

## 2018-09-14 PROCEDURE — 99999 PR PBB SHADOW E&M-EST. PATIENT-LVL III: CPT | Mod: PBBFAC,,, | Performed by: INTERNAL MEDICINE

## 2018-09-14 PROCEDURE — A4216 STERILE WATER/SALINE, 10 ML: HCPCS | Mod: PN | Performed by: INTERNAL MEDICINE

## 2018-09-14 PROCEDURE — 99214 OFFICE O/P EST MOD 30 MIN: CPT | Mod: S$GLB,,, | Performed by: INTERNAL MEDICINE

## 2018-09-14 PROCEDURE — 3008F BODY MASS INDEX DOCD: CPT | Mod: CPTII,S$GLB,, | Performed by: INTERNAL MEDICINE

## 2018-09-14 PROCEDURE — 71046 X-RAY EXAM CHEST 2 VIEWS: CPT | Mod: 26,,, | Performed by: RADIOLOGY

## 2018-09-14 PROCEDURE — 63600175 PHARM REV CODE 636 W HCPCS: Mod: PN | Performed by: INTERNAL MEDICINE

## 2018-09-14 PROCEDURE — 71046 X-RAY EXAM CHEST 2 VIEWS: CPT | Mod: TC

## 2018-09-14 PROCEDURE — 96365 THER/PROPH/DIAG IV INF INIT: CPT | Mod: PN

## 2018-09-14 PROCEDURE — 96361 HYDRATE IV INFUSION ADD-ON: CPT | Mod: PN

## 2018-09-14 RX ORDER — HEPARIN 100 UNIT/ML
500 SYRINGE INTRAVENOUS
Status: CANCELLED | OUTPATIENT
Start: 2018-09-14

## 2018-09-14 RX ORDER — DOXYCYCLINE 100 MG/1
100 CAPSULE ORAL 2 TIMES DAILY
Qty: 14 CAPSULE | Refills: 0 | Status: SHIPPED | OUTPATIENT
Start: 2018-09-14 | End: 2018-11-09 | Stop reason: SDUPTHER

## 2018-09-14 RX ORDER — SODIUM CHLORIDE 0.9 % (FLUSH) 0.9 %
10 SYRINGE (ML) INJECTION
Status: DISCONTINUED | OUTPATIENT
Start: 2018-09-14 | End: 2018-09-14 | Stop reason: HOSPADM

## 2018-09-14 RX ORDER — SODIUM CHLORIDE 0.9 % (FLUSH) 0.9 %
10 SYRINGE (ML) INJECTION
Status: CANCELLED | OUTPATIENT
Start: 2018-09-14

## 2018-09-14 RX ORDER — AZITHROMYCIN 250 MG/1
TABLET, FILM COATED ORAL
Qty: 6 TABLET | Refills: 0 | Status: SHIPPED | OUTPATIENT
Start: 2018-09-14 | End: 2018-09-14 | Stop reason: ALTCHOICE

## 2018-09-14 RX ADMIN — SODIUM CHLORIDE 500 ML: 9 INJECTION, SOLUTION INTRAVENOUS at 03:09

## 2018-09-14 RX ADMIN — SODIUM CHLORIDE, PRESERVATIVE FREE 10 ML: 5 INJECTION INTRAVENOUS at 02:09

## 2018-09-14 RX ADMIN — ZOLEDRONIC ACID 4 MG: 0.04 INJECTION, SOLUTION INTRAVENOUS at 03:09

## 2018-09-15 NOTE — PLAN OF CARE
Problem: Patient Care Overview  Goal: Plan of Care Review  Outcome: Ongoing (interventions implemented as appropriate)  Adequate for discharge.   Pt tolerated Zometa infusion without noted distress.  Reviewed upcoming appointments.  All questions answered. Advised to call MD with any questions or concerns.   Ambulated from infusion center independently by self.

## 2018-09-17 ENCOUNTER — PATIENT MESSAGE (OUTPATIENT)
Dept: HEMATOLOGY/ONCOLOGY | Facility: CLINIC | Age: 63
End: 2018-09-17

## 2018-09-19 ENCOUNTER — TELEPHONE (OUTPATIENT)
Dept: PHARMACY | Facility: CLINIC | Age: 63
End: 2018-09-19

## 2018-09-21 ENCOUNTER — TELEPHONE (OUTPATIENT)
Dept: HEMATOLOGY/ONCOLOGY | Facility: CLINIC | Age: 63
End: 2018-09-21

## 2018-09-21 NOTE — TELEPHONE ENCOUNTER
Contacted patient to discuss her current health condition and afinitor status. Pt is still currently on doxycycline therapy for recent sinus infection diagnosis. Pt states that she is currently feeling better, but still suffering from congestion. Advised patient that for symptoms she could take mucinex or benadryl over the counter. Per her recent ANC improvement to 2.1 and Chayito Fried PA-C clearance pt able to reinitiate afinitor once doxycycline completed (due to finish this evening). Pt to hold off until tomorrow. Advised patient to please contact office if infection symptoms persisted or new concerning symptoms noted. Pt voiced understanding.

## 2018-09-24 ENCOUNTER — TELEPHONE (OUTPATIENT)
Dept: PHARMACY | Facility: CLINIC | Age: 63
End: 2018-09-24

## 2018-09-24 NOTE — TELEPHONE ENCOUNTER
Patient reports that she restarted Afinitor on Saturday 9/22 and has 3 weeks of medication on hand.

## 2018-09-29 ENCOUNTER — OFFICE VISIT (OUTPATIENT)
Dept: URGENT CARE | Facility: CLINIC | Age: 63
End: 2018-09-29
Payer: COMMERCIAL

## 2018-09-29 VITALS
DIASTOLIC BLOOD PRESSURE: 88 MMHG | TEMPERATURE: 97 F | BODY MASS INDEX: 25.34 KG/M2 | SYSTOLIC BLOOD PRESSURE: 137 MMHG | HEIGHT: 63 IN | OXYGEN SATURATION: 100 % | WEIGHT: 143 LBS | HEART RATE: 76 BPM

## 2018-09-29 DIAGNOSIS — S80.212A ABRASION, KNEE, LEFT, INITIAL ENCOUNTER: ICD-10-CM

## 2018-09-29 DIAGNOSIS — S83.92XA SPRAIN OF LEFT KNEE, UNSPECIFIED LIGAMENT, INITIAL ENCOUNTER: Primary | ICD-10-CM

## 2018-09-29 DIAGNOSIS — S96.911A SPRAIN AND STRAIN OF RIGHT ANKLE: ICD-10-CM

## 2018-09-29 DIAGNOSIS — S93.401A SPRAIN AND STRAIN OF RIGHT ANKLE: ICD-10-CM

## 2018-09-29 PROCEDURE — 73562 X-RAY EXAM OF KNEE 3: CPT | Mod: LT,S$GLB,, | Performed by: RADIOLOGY

## 2018-09-29 PROCEDURE — 99213 OFFICE O/P EST LOW 20 MIN: CPT | Mod: S$GLB,,, | Performed by: INTERNAL MEDICINE

## 2018-09-29 PROCEDURE — 73610 X-RAY EXAM OF ANKLE: CPT | Mod: RT,S$GLB,, | Performed by: RADIOLOGY

## 2018-09-29 RX ORDER — MUPIROCIN 20 MG/G
OINTMENT TOPICAL
Qty: 22 G | Refills: 1 | Status: SHIPPED | OUTPATIENT
Start: 2018-09-29

## 2018-09-29 NOTE — PROGRESS NOTES
"Subjective:       Patient ID: Georgia Jeffrey is a 63 y.o. female.    Vitals:  height is 5' 3" (1.6 m) and weight is 64.9 kg (143 lb). Her oral temperature is 97.4 °F (36.3 °C). Her blood pressure is 137/88 and her pulse is 76. Her oxygen saturation is 100%.     Chief Complaint: Fall    Pt fell last night about 8 pm,  while crossing railroad tracks.   She fell forward towards the left side. Pt complains of generalized joint pain, especially the left knee and right ankle.  No head trauma. She has discomfort with ambulation.  Pt has not taken any OTC medications. She has a history of breast cancer with bone mets.       Fall   The accident occurred 12 to 24 hours ago. The fall occurred while walking. She fell from a height of 3 to 5 ft. She landed on concrete. The volume of blood lost was moderate. The point of impact was the left knee and right knee. The pain is present in the right knee, right wrist, left wrist, right shoulder, left shoulder, back and left upper arm. The pain is at a severity of 8/10. The pain is moderate. The symptoms are aggravated by movement. Pertinent negatives include no abdominal pain, fever, nausea or numbness. She has tried nothing for the symptoms. The treatment provided no relief.     Review of Systems   Constitution: Negative for fever.   Cardiovascular: Negative for chest pain and syncope.   Respiratory: Negative for shortness of breath.    Skin: Negative for rash.   Musculoskeletal: Positive for joint pain (Especially the left knee nd right ankle) and joint swelling. Negative for back pain and neck pain.   Gastrointestinal: Negative for abdominal pain and nausea.   Neurological: Negative for dizziness and numbness.       Objective:      Physical Exam   Constitutional: She is oriented to person, place, and time. She appears well-developed and well-nourished. She is cooperative.  Non-toxic appearance. She does not appear ill. No distress.   HENT:   Head: Normocephalic and atraumatic. "   Right Ear: Hearing, tympanic membrane, external ear and ear canal normal.   Left Ear: Hearing, tympanic membrane, external ear and ear canal normal.   Nose: Nose normal. No mucosal edema, rhinorrhea or nasal deformity. No epistaxis. Right sinus exhibits no maxillary sinus tenderness and no frontal sinus tenderness. Left sinus exhibits no maxillary sinus tenderness and no frontal sinus tenderness.   Mouth/Throat: Uvula is midline and mucous membranes are normal. No trismus in the jaw. Normal dentition. No uvula swelling. No posterior oropharyngeal erythema.   Eyes: Conjunctivae and lids are normal. Right eye exhibits no discharge. Left eye exhibits no discharge.   Neck: Trachea normal, normal range of motion, full passive range of motion without pain and phonation normal. Neck supple.   Cardiovascular: Normal rate, regular rhythm and normal pulses.   Pulmonary/Chest: Effort normal and breath sounds normal.   Abdominal: Soft. Normal appearance. She exhibits no pulsatile midline mass.   Musculoskeletal: Normal range of motion. She exhibits edema (Left knee:  Moderate anterior swelling with 2 circular abrasions .  Good ROM but painful  ) and tenderness (Bilateral TTP left knee; no posterior tenderness.  No laxity.  Medial TTP right  ankle ). She exhibits no deformity.   Neurological: She is alert and oriented to person, place, and time. She exhibits normal muscle tone. Coordination normal.   Skin: Skin is warm, dry and intact. No rash noted. There is erythema (Left knee with increase warmth). No pallor.   Psychiatric: She has a normal mood and affect. Her speech is normal and behavior is normal. Cognition and memory are normal.   Nursing note and vitals reviewed.      Assessment:       1. Sprain of left knee, unspecified ligament, initial encounter    2. Sprain and strain of right ankle    3. Abrasion, knee, left, initial encounter        Plan:         Sprain of left knee, unspecified ligament, initial encounter  -      Cancel: XR KNEE 3 VIEW LEFT; Future; Expected date: 09/29/2018  -     XR KNEE 3 VIEW LEFT; Future; Expected date: 09/29/2018  -     HME - OTHER    Sprain and strain of right ankle  -     X-Ray Ankle Complete 3 View Right; Future; Expected date: 09/29/2018  -     HME - OTHER    Abrasion, knee, left, initial encounter  -     mupirocin (BACTROBAN) 2 % ointment; Apply to affected area 3 times daily  Dispense: 22 g; Refill: 1

## 2018-09-29 NOTE — PATIENT INSTRUCTIONS
Knee Sprain    A sprain is an injury to the ligaments or capsule that holds a joint together. There are no broken bones. Most sprains take 3 to 6 weeks to heal. If it a severe sprain where the ligament is completely torn, it can take months to recover.  Most knee sprains are treated with a splint, knee immobilizer brace, or elastic wrap for support. Severe sprains may require surgery.  Home care  · Stay off the injured leg as much as possible until you can walk on it without pain. If you have a lot of pain with walking, crutches or a walker may be prescribed. (These can be rented or purchased at many pharmacies and surgical or orthopedic supply stores). Follow your healthcare provider's advice about when to begin putting weight on that leg.  · Keep your leg elevated to reduce pain and swelling. When sleeping, place a pillow under the injured leg. When sitting, support the injured leg so it is level with your waist. This is very important during the first 48 hours.  · Apply an ice pack over the injured area for 15 to 20 minutes every 3 to 6 hours. You should do this for the first 24 to 48 hours. You can make an ice pack by filling a plastic bag that seals at the top with ice cubes and then wrapping it with a thin towel. Continue to use ice packs for relief of pain and swelling as needed. As the ice melts, be careful to avoid getting your wrap, splint, or cast wet. After 48 hours, apply heat (warm shower or warm bath) for 15 to 20 minutes several times a day, or alternate ice and heat. You can place the ice pack directly over the splint. If you have to wear a hook-and-loop knee brace, you can open it to apply the ice pack, or heat, directly to the knee. Never put ice directly on the skin. Always wrap the ice in a towel or other type of cloth.  · You may use over-the-counter pain medicine to control pain, unless another pain medicine was prescribed.If you have chronic liver or kidney disease or ever had a stomach  ulcer or GI bleeding, talk with your healthcare provider before using these medicines.  · If you were given a splint, keep it completely dry at all times. Bathe with your splint out of the water, protected with 2 large plastic bags, rubber-banded at the top end. If a fiberglass splint gets wet, you can dry it with a hair dryer. If you have a hook-and-loop knee brace, you can remove this to bathe, unless told otherwise.  Follow-up care  Follow up with your doctor as advised. Any X-rays you had today dont show any broken bones, breaks, or fractures. Sometimes fractures dont show up on the first X-ray. Bruises and sprains can sometimes hurt as much as a fracture. These injuries can take time to heal completely. If your symptoms dont improve or they get worse, talk with your doctor. You may need a repeat X-ray. If X-rays were taken, you will be told of any new findings that may affect your care.  Call 911  Call 911 if you have:  ·  Shortness of breath  ·  Chest pain  When to seek medical advice  Call your healthcare provider right away if any of these occur:  · The splint or knee immobilizer brace becomes wet or soft  · The fiberglass cast or splint remains wet for more than 24 hours  · Pain or swelling increases  · The injured leg or toes become cold, blue, numb, or tingly

## 2018-10-01 ENCOUNTER — OFFICE VISIT (OUTPATIENT)
Dept: ORTHOPEDICS | Facility: CLINIC | Age: 63
End: 2018-10-01
Payer: COMMERCIAL

## 2018-10-01 VITALS — WEIGHT: 143 LBS | BODY MASS INDEX: 25.34 KG/M2 | HEIGHT: 63 IN

## 2018-10-01 DIAGNOSIS — S89.92XA LEFT KNEE INJURY, INITIAL ENCOUNTER: Primary | ICD-10-CM
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
## 2018-10-01 DIAGNOSIS — M25.562 ACUTE PAIN OF LEFT KNEE: ICD-10-CM

## 2018-10-01 PROCEDURE — 3008F BODY MASS INDEX DOCD: CPT | Mod: CPTII,S$GLB,, | Performed by: ORTHOPAEDIC SURGERY

## 2018-10-01 PROCEDURE — 99203 OFFICE O/P NEW LOW 30 MIN: CPT | Mod: S$GLB,,, | Performed by: ORTHOPAEDIC SURGERY

## 2018-10-01 PROCEDURE — 99999 PR PBB SHADOW E&M-EST. PATIENT-LVL II: CPT | Mod: PBBFAC,,, | Performed by: ORTHOPAEDIC SURGERY

## 2018-10-01 NOTE — PROGRESS NOTES
HISTORY OF PRESENT ILLNESS:  63 years old, injured her left knee a few days ago,   09/29/2018.  She fell onto her knee.  She went to a party, she was walking in a   dress, she slipped on a slippery surface landing onto her knees.  It has been   hurting since then for the past few days.  Went to outlying facility, had x-ray   which was negative, comes today for more definitive treatment.  She has been   wearing a brace which has helped.  Rates pain as up to 8/10 /on the pain scale.    PHYSICAL EXAMINATION:  Today shows she has an abrasion on the anterior aspect of   her knee.  I cannot detect any instability about her knee.  Her extensor   mechanism is functioning well.  No swelling is detected.  Well perfused   distally.  Skin is intact otherwise other than a very superficial abrasion   anteriorly.    X-rays are negative.    ASSESSMENT:  Knee contusion with abrasion.    PLAN:  Recommend continued local wound care.  She can use a brace.  We will give   her some crutches too as she is uncomfortable.  She can use these as she needs.    We will have her come back in a few weeks' time as an established patient   here.      PBB/HN  dd: 10/01/2018 11:47:26 (CDT)  td: 10/02/2018 03:43:42 (CDT)  Doc ID   #[id number]  Job ID #[id number]    CC:     Further History  Aching pain  Worse with activity  Relieved with rest  No other associated symptoms  No other radiation    Further Exam  Alert and oriented  Pleasant  Contralateral limb has appropriate range of motion for age and condition  Contralateral limb has appropriate strength for age and condition  Contralateral limb has appropriate stability  for age and condition  No adenopathy  Pulses are appropriate for current condition  Skin is intact        Chief Complaint    Chief Complaint   Patient presents with    Left Knee - Pain, Injury       HPI  Georgia Jeffrey is a 63 y.o.  female who presents with       Past Medical History  Past Medical History:   Diagnosis Date    Cancer      breast cancer; lung cancer    PONV (postoperative nausea and vomiting)        Past Surgical History  Past Surgical History:   Procedure Laterality Date    BREAST SURGERY  2004    breast reduction    ENDOBRONCHIAL ULTRASOUND (EBUS) N/A 7/12/2017    [facility]    laparascopy      MASTECTOMY Bilateral 05/11/2017    TEMPOROMANDIBULAR JOINT SURGERY      TONSILLECTOMY         Medications  Current Outpatient Medications   Medication Sig    citalopram (CELEXA) 10 MG tablet Take 1 tablet (10 mg total) by mouth once daily.    citalopram (CELEXA) 20 MG tablet Take 1 tablet (20 mg total) by mouth once daily.    cyclobenzaprine (FLEXERIL) 10 MG tablet Take 1 tablet (10 mg total) by mouth 3 (three) times daily as needed for Muscle spasms.    dexamethasone (DECADRON) 0.5 mg/5 mL Elix 2 teaspoons swish for 2 minutes and spit 4 times daily- to prevent mouth sores from Afinitor    dexamethasone 0.5 mg/5 mL Soln     doxycycline (VIBRAMYCIN) 100 MG Cap Take 1 capsule (100 mg total) by mouth 2 (two) times daily.    dronabinol (MARINOL) 5 MG capsule TAKE 1 CAPSULE(5 MG) BY MOUTH TWICE DAILY BEFORE MEALS    everolimus, antineoplastic, (AFINITOR) 10 mg Tab Take 1 tablet (10 mg total) by mouth once daily.    exemestane (AROMASIN) 25 mg tablet TK 1 T PO ONCE D    magic mouthwash diphen/antac/lidoc/nysta Swish and swallow 10 ml by mouth 4 times a day as needed    morphine 10 mg/5 mL solution Take 3 mLs (6 mg total) by mouth every 3 (three) hours as needed for Pain.    mupirocin (BACTROBAN) 2 % ointment Apply to affected area 3 times daily    nitrofurantoin (MACRODANTIN) 100 MG capsule Take 1 capsule (100 mg total) by mouth 2 (two) times daily.    ondansetron (ZOFRAN) 8 MG tablet Take 1 tablet (8 mg total) by mouth every 8 (eight) hours as needed for Nausea.    tobramycin-dexamethasone 0.3-0.1% (TOBRADEX) 0.3-0.1 % DrpS Place 1 drop into both eyes every 6 (six) hours.    traMADol (ULTRAM)  50 mg tablet TK 2 TS PO Q 4 H PRN P     No current facility-administered medications for this visit.        Allergies  Review of patient's allergies indicates:   Allergen Reactions    Penicillins Other (See Comments)     unknown    Vancomycin analogues Hives       Family History  Family History   Problem Relation Age of Onset    GI problems Mother     Heart disease Father        Social History  Social History     Socioeconomic History    Marital status:      Spouse name: Not on file    Number of children: Not on file    Years of education: Not on file    Highest education level: Not on file   Social Needs    Financial resource strain: Not on file    Food insecurity - worry: Not on file    Food insecurity - inability: Not on file    Transportation needs - medical: Not on file    Transportation needs - non-medical: Not on file   Occupational History    Not on file   Tobacco Use    Smoking status: Never Smoker    Smokeless tobacco: Never Used   Substance and Sexual Activity    Alcohol use: Yes     Comment: rarely    Drug use: No    Sexual activity: Yes     Partners: Male   Other Topics Concern    Not on file   Social History Narrative    Not on file               Review of Systems     Constitutional: Negative    HENT: Negative  Eyes: Negative  Respiratory: Negative  Cardiovascular: Negative  Musculoskeletal: HPI  Skin: Negative  Neurological: Negative  Hematological: Negative  Endocrine: Negative                 Physical Exam    There were no vitals filed for this visit.  Body mass index is 25.33 kg/m².  Physical Examination:     General appearance -  well appearing, and in no distress  Mental status - awake  Neck - supple  Chest -  symmetric air entry  Heart - normal rate   Abdomen - soft      Assessment     1. Left knee injury, initial encounter    2. Acute pain of left knee    3. Fall, initial encounter          Plan

## 2018-10-01 NOTE — Clinical Note
October 1, 2018      Trish Johnson MD  [address] [address]  [address]  [facility] [address]           [facility] [address] [facility] [address] [place] [address]  Phone: [phone]          Patient: Georgia Jeffrey   MR Number: [id number]   YOB: 1955   Date of Visit: 10/1/2018       Dear Dr. Trish Johnson:    Thank you for referring Georgia Jeffrey to me for evaluation. Attached you will find relevant portions of my assessment and plan of care.    If you have questions, please do not hesitate to call me. I look forward to following Georgia Jeffrey along with you.    Sincerely,    Leonard Chavis MD    Enclosure  CC:  No Recipients    If you would like to receive this communication electronically, please contact [email or web address] or [phone] to request more information on [employer] [facility] Link access.    For providers and/or their staff who would like to refer a patient to [facility], please contact us through our one-stop-shop provider referral line, Aleksandr Rousseau, at [phone].    If you feel you have received this communication in error or would no longer like to receive these types of communications, please e-mail [email or web address]

## 2018-10-05 ENCOUNTER — TELEPHONE (OUTPATIENT)
Dept: PHARMACY | Facility: CLINIC | Age: 63
End: 2018-10-05

## 2018-10-11 ENCOUNTER — TELEPHONE (OUTPATIENT)
Dept: PHARMACY | Facility: CLINIC | Age: 63
End: 2018-10-11

## 2018-10-11 ENCOUNTER — LAB VISIT (OUTPATIENT)
Dept: LAB | Facility: HOSPITAL | Age: 63
End: 2018-10-11
Attending: INTERNAL MEDICINE
Payer: COMMERCIAL

## 2018-10-11 DIAGNOSIS — C50.112 MALIGNANT NEOPLASM OF CENTRAL PORTION OF LEFT BREAST IN FEMALE, ESTROGEN RECEPTOR POSITIVE: ICD-10-CM

## 2018-10-11 DIAGNOSIS — Z17.0 MALIGNANT NEOPLASM OF CENTRAL PORTION OF LEFT BREAST IN FEMALE, ESTROGEN RECEPTOR POSITIVE: ICD-10-CM

## 2018-10-11 LAB
ALBUMIN SERPL BCP-MCNC: 4 G/DL
ALP SERPL-CCNC: 59 U/L
ALT SERPL W/O P-5'-P-CCNC: 19 U/L
ANION GAP SERPL CALC-SCNC: 7 MMOL/L
AST SERPL-CCNC: 24 U/L
BASOPHILS # BLD AUTO: 0.02 K/UL
BASOPHILS NFR BLD: 0.5 %
BILIRUB SERPL-MCNC: 0.6 MG/DL
BUN SERPL-MCNC: 13 MG/DL
CALCIUM SERPL-MCNC: 9.1 MG/DL
CHLORIDE SERPL-SCNC: 105 MMOL/L
CO2 SERPL-SCNC: 27 MMOL/L
CREAT SERPL-MCNC: 0.57 MG/DL
DIFFERENTIAL METHOD: ABNORMAL
EOSINOPHIL # BLD AUTO: 0.3 K/UL
EOSINOPHIL NFR BLD: 6.4 %
ERYTHROCYTE [DISTWIDTH] IN BLOOD BY AUTOMATED COUNT: 16.8 %
EST. GFR  (AFRICAN AMERICAN): >60 ML/MIN/1.73 M^2
EST. GFR  (NON AFRICAN AMERICAN): >60 ML/MIN/1.73 M^2
GLUCOSE SERPL-MCNC: 90 MG/DL
HCT VFR BLD AUTO: 34.2 %
HGB BLD-MCNC: 11.2 G/DL
LYMPHOCYTES # BLD AUTO: 1.1 K/UL
LYMPHOCYTES NFR BLD: 28 %
MCH RBC QN AUTO: 32.6 PG
MCHC RBC AUTO-ENTMCNC: 32.7 G/DL
MCV RBC AUTO: 99 FL
MONOCYTES # BLD AUTO: 0.4 K/UL
MONOCYTES NFR BLD: 9.4 %
NEUTROPHILS # BLD AUTO: 2.2 K/UL
NEUTROPHILS NFR BLD: 55.7 %
NRBC BLD-RTO: 0 /100 WBC
PLATELET # BLD AUTO: 164 K/UL
PMV BLD AUTO: 10 FL
POTASSIUM SERPL-SCNC: 5.1 MMOL/L
PROT SERPL-MCNC: 7.4 G/DL
RBC # BLD AUTO: 3.44 M/UL
SODIUM SERPL-SCNC: 139 MMOL/L
WBC # BLD AUTO: 3.93 K/UL

## 2018-10-11 PROCEDURE — 86300 IMMUNOASSAY TUMOR CA 15-3: CPT

## 2018-10-11 PROCEDURE — 80053 COMPREHEN METABOLIC PANEL: CPT

## 2018-10-11 PROCEDURE — 85025 COMPLETE CBC W/AUTO DIFF WBC: CPT | Mod: PN

## 2018-10-11 PROCEDURE — 36415 COLL VENOUS BLD VENIPUNCTURE: CPT | Mod: PN

## 2018-10-11 PROCEDURE — 85025 COMPLETE CBC W/AUTO DIFF WBC: CPT

## 2018-10-11 PROCEDURE — 80053 COMPREHEN METABOLIC PANEL: CPT | Mod: PN

## 2018-10-11 NOTE — PROGRESS NOTES
Subjective:       Patient ID: Georgia Jeffrey is a 63 y.o. female.    Chief Complaint: No chief complaint on file.    HPI Ms. Jeffrey is a 62-year-old female seen for metastatic carcinoma of the left breast.       She is currently on exemestane and Afinitor.  Her major issue is significant fatigue which has been quite severe for the last week and a half.  There has been no change in her breathing she has no significant cough.  She has no significant pain.      She is on Celexa 30 mg      Breast  History: She developed palpable abnormality in her left breas in the early part of 2017.    Diagnostic mammography from April 18, 2017 showed an irregular mass with pleomorphic calcifications at the 9:00 area of the left breast.  By ultrasound this was solid mass measuring 36 mm.     Breast biopsy on April 19, 2017 showed infiltrating ductal carcinoma (histologic grade 3, nuclear grade 2, mitotic index 3) tumor was 99% ER positive, 91% HI positive.  HER-2 was negative by FISH.     On May 11, 2017 she underwent bilateral nipple sparing mastectomies by Dr.Karl Gutierrez and autologous breast reconstruction by  at [facility].  She developed some compromise of her left flap which was removed and an implant was placed.  That became infected and was removed.  Several weeks ago she underwent removal of residual reconstruction from both sides.     The pathology from that surgery showed a 3 cm high grade grading ductal carcinoma (3+3+2).  There is associated lymphovascular invasion.  In addition was associated high-grade DCIS.  [place] lymph node was negative.    The right breast showed no evidence of any abnormality.  Final pathological stage TII N0 stage IIA.  Oncotype was 31.     She was seen by medical oncology and chemotherapy was discussed.      PET/CT scan was performed on June 27 which showed increased uptake in the subcarinal node measuring 9 mm an SUV of 5.9, there was increased uptake in the  right hilum with an SUV of 5.9, there were multiple pulmonary nodules on the right side a right middle lobe nodule measured 16 mm with an SUV of 6.8 right lower lobe nodule 9 mm with an SUV of 2.6.  In addition there were multiple other subcentimeter nodules.  Increased uptake was seen in the right intertrochanteric femur with an SUV of 4.  (Subsequent MRI of the right femur on July 14 did not show any bony abnormality).     On July 14 she underwent bronchoscopy and endoscopic ultrasound.  Left lower lobe brush sample was positive for carcinoma and a fine-needle aspirate from a station 7 lymph node was also positive for carcinoma.  These were estrogen receptor positive.     In July 2017 she was started on systemic therapy with Faslodex and palbociclib.  In August 2017 she developed a DVT of the left lower extremity with the peroneal and posterior tibial veins exhibiting thrombosis.  She was started on apixaban at that time.  Follow-up PET scan in September  suggested bone metastasis.     Due to insurance  issues she was changed to letrozole in October 2017 and continued her palbociclib. She had significant nausea and vomiting secondary to letrozole and that was discontinued.        On December 1, 2017 she underwent MRI scanning of the thoracic and lumbar spine.  That showed a metastasis at the left T2 lamina.  The lumbar spine was negative for malignancy Subsequent MRI scans on December 4 were negative in the cervical spine and brain were all negative for malignancy.  Chest x-ray in December 4 was clear.     She was off all therapy for about 6 weeks then restarted  faslodex and palbociclib on  12/17/17.     On January 9, 2018 she began Zometa therapy.    In April 2018 she began exemestane and everolimus.     She completed radiation to her T 2 metastasis with Dr. Witt on the [facility] at the end of April    In August 2018 PET scan - Overall improvement in metastatic disease since the prior study  indicated by disappearance of left supraclavicular lymphadenopathy and decreased hypermetabolism of several osseous metastatic lesions.  Review of Systems   Constitutional: Positive for fatigue. Negative for appetite change and unexpected weight change.   HENT: Negative for congestion.    Respiratory: Negative for cough and shortness of breath.    Cardiovascular: Negative for chest pain.   Gastrointestinal: Negative for abdominal pain and diarrhea.   Genitourinary: Negative for frequency.   Musculoskeletal: Positive for back pain.   Skin: Negative for rash.   Neurological: Negative for weakness and headaches.   Hematological: Negative for adenopathy.   Psychiatric/Behavioral: Negative for dysphoric mood. The patient is not nervous/anxious.        Objective:      Physical Exam   Constitutional: She is oriented to person, place, and time. She appears well-developed and well-nourished.   HENT:   Mouth/Throat: No oropharyngeal exudate.   Eyes: No scleral icterus.   Cardiovascular: Normal rate and regular rhythm.   Pulmonary/Chest: Effort normal. She has no wheezes. She has no rales.       Abdominal: Soft. She exhibits no mass. There is no tenderness.   No palpable abnormality in the right lower quadrant, possible abdominal wall weakness   Lymphadenopathy:     She has no cervical adenopathy.     She has no axillary adenopathy.        Right: No supraclavicular adenopathy present.        Left: No supraclavicular adenopathy present.   Neurological: She is alert and oriented to person, place, and time.   Skin: No rash noted.   Psychiatric: She has a normal mood and affect. Her behavior is normal. Thought content normal.   Vitals reviewed.      Assessment:      CBC shows hemoglobin 11.2, otherwise unremarkable.  Metabolic profile is normal.      1. Malignant neoplasm of central portion of left breast in female, estrogen receptor positive    2. 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  Scans at that time  Zometa every 3 months

## 2018-10-12 ENCOUNTER — OFFICE VISIT (OUTPATIENT)
Dept: HEMATOLOGY/ONCOLOGY | Facility: CLINIC | Age: 63
End: 2018-10-12
Payer: COMMERCIAL

## 2018-10-12 VITALS
HEIGHT: 63 IN | RESPIRATION RATE: 18 BRPM | HEART RATE: 74 BPM | OXYGEN SATURATION: 99 % | TEMPERATURE: 97 F | SYSTOLIC BLOOD PRESSURE: 135 MMHG | WEIGHT: 145.94 LBS | BODY MASS INDEX: 25.86 KG/M2 | DIASTOLIC BLOOD PRESSURE: 80 MMHG

## 2018-10-12 DIAGNOSIS — C79.51 CARCINOMA OF BREAST METASTATIC TO BONE, UNSPECIFIED LATERALITY: ICD-10-CM

## 2018-10-12 DIAGNOSIS — C50.112 MALIGNANT NEOPLASM OF CENTRAL PORTION OF LEFT BREAST IN FEMALE, ESTROGEN RECEPTOR POSITIVE: Primary | ICD-10-CM

## 2018-10-12 DIAGNOSIS — Z17.0 MALIGNANT NEOPLASM OF CENTRAL PORTION OF LEFT BREAST IN FEMALE, ESTROGEN RECEPTOR POSITIVE: Primary | ICD-10-CM

## 2018-10-12 DIAGNOSIS — C50.919 CARCINOMA OF BREAST METASTATIC TO BONE, UNSPECIFIED LATERALITY: ICD-10-CM

## 2018-10-12 PROCEDURE — 99999 PR PBB SHADOW E&M-EST. PATIENT-LVL III: CPT | Mod: PBBFAC,,, | Performed by: INTERNAL MEDICINE

## 2018-10-12 PROCEDURE — 3008F BODY MASS INDEX DOCD: CPT | Mod: CPTII,S$GLB,, | Performed by: INTERNAL MEDICINE

## 2018-10-12 PROCEDURE — 99214 OFFICE O/P EST MOD 30 MIN: CPT | Mod: S$GLB,,, | Performed by: INTERNAL MEDICINE

## 2018-10-12 NOTE — Clinical Note
Labs the day before on the [place], PET scan at [facility] the day before or here the day of her visit

## 2018-10-14 LAB — CANCER AG27-29 SERPL-ACNC: 52.5 U/ML

## 2018-10-15 ENCOUNTER — PATIENT MESSAGE (OUTPATIENT)
Dept: HEMATOLOGY/ONCOLOGY | Facility: CLINIC | Age: 63
End: 2018-10-15

## 2018-10-15 ENCOUNTER — PATIENT MESSAGE (OUTPATIENT)
Dept: ORTHOPEDICS | Facility: CLINIC | Age: 63
End: 2018-10-15

## 2018-10-15 DIAGNOSIS — R53.0 NEOPLASTIC MALIGNANT RELATED FATIGUE: ICD-10-CM

## 2018-10-15 RX ORDER — METHYLPHENIDATE HYDROCHLORIDE 5 MG/1
5 TABLET ORAL 2 TIMES DAILY WITH MEALS
Qty: 60 TABLET | Refills: 0 | Status: SHIPPED | OUTPATIENT
Start: 2018-10-15 | End: 2019-01-15

## 2018-10-22 ENCOUNTER — TELEPHONE (OUTPATIENT)
Dept: PHARMACY | Facility: CLINIC | Age: 63
End: 2018-10-22

## 2018-10-22 NOTE — TELEPHONE ENCOUNTER
Called patient in regards to her Afinitor follow up. Patient stated she takes correctly, 1 tablet every day. She eats breakfast then routinely takes medication afterwards. She places medication directly in mouth from blister pack to avoid directly touching the tablet. She confirmed missing one dose in the past month due to having a foggy brain and not remembering medication until the end of the day. After this, she started taking Afinitor out of the cabinet every morning to set near her breakfast so she would remember to take medication. Discussed the utility of medication reminder alarms if she continued to miss doses.     She complained of severe fatigue. She has been maintaining plenty of fluids and staying active. This is being addressed at each provider's appt. Patient additionally complained of losing finger/toe nails (5-22% incidence). She has read of this ADR on several of her blogs. She has had slight nausea but has improved over the past month. She has been having intermitent speech and memory problems. Her BP has decreased to normal levels. She complained of sores on her body but denied stomatitis. Patient will continue to monitor for side effects and discuss with provider at next appt.

## 2018-11-05 ENCOUNTER — TELEPHONE (OUTPATIENT)
Dept: PHARMACY | Facility: CLINIC | Age: 63
End: 2018-11-05

## 2018-11-08 ENCOUNTER — LAB VISIT (OUTPATIENT)
Dept: LAB | Facility: HOSPITAL | Age: 63
End: 2018-11-08
Attending: INTERNAL MEDICINE
Payer: COMMERCIAL

## 2018-11-08 DIAGNOSIS — C50.112 MALIGNANT NEOPLASM OF CENTRAL PORTION OF LEFT BREAST IN FEMALE, ESTROGEN RECEPTOR POSITIVE: ICD-10-CM

## 2018-11-08 DIAGNOSIS — Z17.0 MALIGNANT NEOPLASM OF CENTRAL PORTION OF LEFT BREAST IN FEMALE, ESTROGEN RECEPTOR POSITIVE: ICD-10-CM

## 2018-11-08 LAB
ALBUMIN SERPL BCP-MCNC: 4 G/DL
ALP SERPL-CCNC: 75 U/L
ALT SERPL W/O P-5'-P-CCNC: 26 U/L
ANION GAP SERPL CALC-SCNC: 8 MMOL/L
AST SERPL-CCNC: 24 U/L
BASOPHILS # BLD AUTO: 0.01 K/UL
BASOPHILS NFR BLD: 0.2 %
BILIRUB SERPL-MCNC: 0.8 MG/DL
BUN SERPL-MCNC: 11 MG/DL
CALCIUM SERPL-MCNC: 8.8 MG/DL
CHLORIDE SERPL-SCNC: 105 MMOL/L
CO2 SERPL-SCNC: 26 MMOL/L
CREAT SERPL-MCNC: 0.53 MG/DL
DIFFERENTIAL METHOD: ABNORMAL
EOSINOPHIL # BLD AUTO: 0.4 K/UL
EOSINOPHIL NFR BLD: 6.8 %
ERYTHROCYTE [DISTWIDTH] IN BLOOD BY AUTOMATED COUNT: 15.6 %
EST. GFR  (AFRICAN AMERICAN): >60 ML/MIN/1.73 M^2
EST. GFR  (NON AFRICAN AMERICAN): >60 ML/MIN/1.73 M^2
GLUCOSE SERPL-MCNC: 162 MG/DL
HCT VFR BLD AUTO: 32.8 %
HGB BLD-MCNC: 11 G/DL
LYMPHOCYTES # BLD AUTO: 1.6 K/UL
LYMPHOCYTES NFR BLD: 31.3 %
MCH RBC QN AUTO: 34 PG
MCHC RBC AUTO-ENTMCNC: 33.5 G/DL
MCV RBC AUTO: 101 FL
MONOCYTES # BLD AUTO: 0.5 K/UL
MONOCYTES NFR BLD: 9.8 %
NEUTROPHILS # BLD AUTO: 2.7 K/UL
NEUTROPHILS NFR BLD: 51.9 %
NRBC BLD-RTO: 0 /100 WBC
PLATELET # BLD AUTO: 171 K/UL
PMV BLD AUTO: 10 FL
POTASSIUM SERPL-SCNC: 4.6 MMOL/L
PROT SERPL-MCNC: 7.4 G/DL
RBC # BLD AUTO: 3.24 M/UL
SODIUM SERPL-SCNC: 139 MMOL/L
WBC # BLD AUTO: 5.12 K/UL

## 2018-11-08 PROCEDURE — 86300 IMMUNOASSAY TUMOR CA 15-3: CPT

## 2018-11-08 PROCEDURE — 85025 COMPLETE CBC W/AUTO DIFF WBC: CPT

## 2018-11-08 PROCEDURE — 85025 COMPLETE CBC W/AUTO DIFF WBC: CPT | Mod: PN

## 2018-11-08 PROCEDURE — 80053 COMPREHEN METABOLIC PANEL: CPT

## 2018-11-08 PROCEDURE — 80053 COMPREHEN METABOLIC PANEL: CPT | Mod: PN

## 2018-11-08 PROCEDURE — 36415 COLL VENOUS BLD VENIPUNCTURE: CPT | Mod: PN

## 2018-11-08 NOTE — PROGRESS NOTES
2Subjective:       Patient ID: Georgia Jeffrey is a 63 y.o. female.    Chief Complaint: No chief complaint on file.    HPI Ms. Jeffrey is a 63-year-old female seen for metastatic carcinoma of the left breast.     She is currently on exemestane and Afinitor.    She has no significant change in her symptoms the continues to note some fatigue and occasional back pain.  She has had significant nail changes related to the Afinitor.        She is on Celexa 30 mg      Breast  History: She developed palpable abnormality in her left breast in the early part of 2017.    Diagnostic mammography from April 18, 2017 showed an irregular mass with pleomorphic calcifications at the 9:00 area of the left breast.  By ultrasound this was solid mass measuring 36 mm.     Breast biopsy on April 19, 2017 showed infiltrating ductal carcinoma (histologic grade 3, nuclear grade 2, mitotic index 3) tumor was 99% ER positive, 91% [place] positive.  HER-2 was negative by FISH.     On May 11, 2017 she underwent bilateral nipple sparing mastectomies by Dr.Karl Gutiererz and autologous breast reconstruction by  at [facility].  She developed some compromise of her left flap which was removed and an implant was placed.  That became infected and was removed.  Several weeks ago she underwent removal of residual reconstruction from both sides.     The pathology from that surgery showed a 3 cm high grade grading ductal carcinoma (3+3+2).  There is associated lymphovascular invasion.  In addition was associated high-grade DCIS.  [place] lymph node was negative.    The right breast showed no evidence of any abnormality.  Final pathological stage TII N0 stage IIA.  Oncotype was 31.     She was seen by medical oncology and chemotherapy was discussed.      PET/CT scan was performed on June 27 which showed increased uptake in the subcarinal node measuring 9 mm an SUV of 5.9, there was increased uptake in the right hilum with an SUV of  5.9, there were multiple pulmonary nodules on the right side a right middle lobe nodule measured 16 mm with an SUV of 6.8 right lower lobe nodule 9 mm with an SUV of 2.6.  In addition there were multiple other subcentimeter nodules.  Increased uptake was seen in the right intertrochanteric femur with an SUV of 4.  (Subsequent MRI of the right femur on July 14 did not show any bony abnormality).     On July 14 she underwent bronchoscopy and endoscopic ultrasound.  Left lower lobe brush sample was positive for carcinoma and a fine-needle aspirate from a station 7 lymph node was also positive for carcinoma.  These were estrogen receptor positive.     In July 2017 she was started on systemic therapy with Faslodex and palbociclib.  In August 2017 she developed a DVT of the left lower extremity with the peroneal and posterior tibial veins exhibiting thrombosis.  She was started on apixaban at that time.  Follow-up PET scan in September  suggested bone metastasis.     Due to insurance  issues she was changed to letrozole in October 2017 and continued her palbociclib. She had significant nausea and vomiting secondary to letrozole and that was discontinued.        On December 1, 2017 she underwent MRI scanning of the thoracic and lumbar spine.  That showed a metastasis at the left T2 lamina.  The lumbar spine was negative for malignancy Subsequent MRI scans on December 4 were negative in the cervical spine and brain were all negative for malignancy.  Chest x-ray in December 4 was clear.     She was off all therapy for about 6 weeks then restarted  faslodex and palbociclib on  12/17/17.     On January 9, 2018 she began Zometa therapy.    In April 2018 she began exemestane and everolimus.     She completed radiation to her T 2 metastasis with Dr. Witt on the [facility] at the end of April    In August 2018 PET scan - Overall improvement in metastatic disease since the prior study indicated by disappearance of  left supraclavicular lymphadenopathy and decreased hypermetabolism of several osseous metastatic lesions.  Review of Systems   Constitutional: Positive for fatigue. Negative for appetite change and unexpected weight change.   HENT: Negative for congestion.    Respiratory: Negative for cough and shortness of breath.    Cardiovascular: Negative for chest pain.   Gastrointestinal: Negative for abdominal pain and diarrhea.   Genitourinary: Negative for frequency.   Musculoskeletal: Positive for back pain.   Skin: Negative for rash.   Neurological: Negative for weakness and headaches.   Hematological: Negative for adenopathy.   Psychiatric/Behavioral: Negative for dysphoric mood. The patient is not nervous/anxious.        Objective:      Physical Exam   Constitutional: She is oriented to person, place, and time. She appears well-developed and well-nourished.   HENT:   Mouth/Throat: No oropharyngeal exudate.   Eyes: No scleral icterus.   Cardiovascular: Normal rate and regular rhythm.   Pulmonary/Chest: Effort normal. She has no wheezes. She has no rales.       Abdominal: Soft. She exhibits no mass. There is no tenderness.   No palpable abnormality in the right lower quadrant, possible abdominal wall weakness   Lymphadenopathy:     She has no cervical adenopathy.     She has no axillary adenopathy.        Right: No supraclavicular adenopathy present.        Left: No supraclavicular adenopathy present.   Neurological: She is alert and oriented to person, place, and time.   Skin: No rash noted.   Some swelling the paronychia right great toe   Psychiatric: She has a normal mood and affect. Her behavior is normal. Thought content normal.   Vitals reviewed.      Assessment:      PET scan-Index left sacral ala SUV max 6.19, previously 4.8..    Index L5 SUV max 7.7, previously 6.93..    Index left lamina T2 SUV max 3.21, previously 2.21..    There is a new left lower neck lymph node SUV max 2.68.    There is a new left  supraclavicular lymph node SUV max 2.45.  There are 3 hypermetabolic left axillary lymph nodes as opposed to 1 on the prior study.  The lower most was positive on the prior study SUV max 4.78, previously 5.17.     CBC -normal other than hemoglobin 11  Metabolic profile shows glucose 162 with normal hepatic and renal function      1. Malignant neoplasm of central portion of left breast in female, estrogen receptor positive    2. 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  She will continue exemestane for the time being.  We will need to change therapies but will do so after her reconstructive surgery on November 20th.  Options at this point would either be oral Xeloda or possibly an alternative endocrine therapy.    Doxycycline for her paronychia.  Return in approximately 1 month.    Zometa every 3 months  Distress Screening Results: Psychosocial Distress screening score of Distress Score: 3 noted and reviewed. No intervention indicated.

## 2018-11-09 ENCOUNTER — TELEPHONE (OUTPATIENT)
Dept: PHARMACY | Facility: CLINIC | Age: 63
End: 2018-11-09

## 2018-11-09 ENCOUNTER — OFFICE VISIT (OUTPATIENT)
Dept: HEMATOLOGY/ONCOLOGY | Facility: CLINIC | Age: 63
End: 2018-11-09
Payer: COMMERCIAL

## 2018-11-09 ENCOUNTER — HOSPITAL ENCOUNTER (OUTPATIENT)
Dept: RADIOLOGY | Facility: HOSPITAL | Age: 63
Discharge: HOME | End: 2018-11-09
Attending: INTERNAL MEDICINE
Payer: COMMERCIAL

## 2018-11-09 VITALS
SYSTOLIC BLOOD PRESSURE: 156 MMHG | TEMPERATURE: 98 F | BODY MASS INDEX: 24.69 KG/M2 | DIASTOLIC BLOOD PRESSURE: 81 MMHG | OXYGEN SATURATION: 100 % | HEART RATE: 73 BPM | WEIGHT: 144.63 LBS | RESPIRATION RATE: 18 BRPM | HEIGHT: 64 IN

## 2018-11-09 DIAGNOSIS — C79.51 CARCINOMA OF BREAST METASTATIC TO BONE, UNSPECIFIED LATERALITY: ICD-10-CM

## 2018-11-09 DIAGNOSIS — Z17.0 MALIGNANT NEOPLASM OF CENTRAL PORTION OF LEFT BREAST IN FEMALE, ESTROGEN RECEPTOR POSITIVE: Primary | ICD-10-CM

## 2018-11-09 DIAGNOSIS — C50.919 CARCINOMA OF BREAST METASTATIC TO INTRATHORACIC LYMPH NODE, UNSPECIFIED LATERALITY: ICD-10-CM

## 2018-11-09 DIAGNOSIS — C50.112 MALIGNANT NEOPLASM OF CENTRAL PORTION OF LEFT BREAST IN FEMALE, ESTROGEN RECEPTOR POSITIVE: Primary | ICD-10-CM

## 2018-11-09 DIAGNOSIS — R05.9 COUGH: ICD-10-CM

## 2018-11-09 DIAGNOSIS — Z17.0 MALIGNANT NEOPLASM OF CENTRAL PORTION OF LEFT BREAST IN FEMALE, ESTROGEN RECEPTOR POSITIVE: ICD-10-CM

## 2018-11-09 DIAGNOSIS — C77.1 CARCINOMA OF BREAST METASTATIC TO INTRATHORACIC LYMPH NODE, UNSPECIFIED LATERALITY: ICD-10-CM

## 2018-11-09 DIAGNOSIS — C50.919 CARCINOMA OF BREAST METASTATIC TO BONE, UNSPECIFIED LATERALITY: ICD-10-CM

## 2018-11-09 DIAGNOSIS — C50.112 MALIGNANT NEOPLASM OF CENTRAL PORTION OF LEFT BREAST IN FEMALE, ESTROGEN RECEPTOR POSITIVE: ICD-10-CM

## 2018-11-09 LAB — POCT GLUCOSE: 84 MG/DL (ref 70–110)

## 2018-11-09 PROCEDURE — 99999 PR PBB SHADOW E&M-EST. PATIENT-LVL V: CPT | Mod: PBBFAC,,, | Performed by: INTERNAL MEDICINE

## 2018-11-09 PROCEDURE — 78815 PET IMAGE W/CT SKULL-THIGH: CPT | Mod: TC

## 2018-11-09 PROCEDURE — 99214 OFFICE O/P EST MOD 30 MIN: CPT | Mod: S$GLB,,, | Performed by: INTERNAL MEDICINE

## 2018-11-09 PROCEDURE — 3008F BODY MASS INDEX DOCD: CPT | Mod: CPTII,S$GLB,, | Performed by: INTERNAL MEDICINE

## 2018-11-09 PROCEDURE — 78815 PET IMAGE W/CT SKULL-THIGH: CPT | Mod: 26,PS,, | Performed by: RADIOLOGY

## 2018-11-09 PROCEDURE — A9552 F18 FDG: HCPCS

## 2018-11-09 RX ORDER — DOXYCYCLINE 100 MG/1
100 CAPSULE ORAL 2 TIMES DAILY
Qty: 14 CAPSULE | Refills: 0 | Status: SHIPPED | OUTPATIENT
Start: 2018-11-09

## 2018-11-09 NOTE — TELEPHONE ENCOUNTER
Per note from Dr. Whatley's office visit today, patient is to DC Afinitor but will continue exemestane. Called Ms. Jeffrey - she is aware of these changes. She is also aware that she may start Xeloda as next line of therapy - next appt with Dr. Whatley on 12/10. Advised her we will close her out for now until/if we get a new script for Xeloda. She has a few doses of Afinitor left, so she plans to bring them to her appt on 12/10 for disposal. She also indicated her secondary insurance would become her primary insurance at the first of the year - advised her we do a thorough BI and have a great financial team should there be any issues. Patient verbalized understanding.

## 2018-11-12 ENCOUNTER — PATIENT MESSAGE (OUTPATIENT)
Dept: HEMATOLOGY/ONCOLOGY | Facility: CLINIC | Age: 63
End: 2018-11-12

## 2018-11-13 ENCOUNTER — TELEPHONE (OUTPATIENT)
Dept: PHARMACY | Facility: CLINIC | Age: 63
End: 2018-11-13

## 2018-11-13 ENCOUNTER — PATIENT MESSAGE (OUTPATIENT)
Dept: HEMATOLOGY/ONCOLOGY | Facility: CLINIC | Age: 63
End: 2018-11-13

## 2018-11-13 DIAGNOSIS — C50.112 MALIGNANT NEOPLASM OF CENTRAL PORTION OF LEFT BREAST IN FEMALE, ESTROGEN RECEPTOR POSITIVE: Primary | ICD-10-CM

## 2018-11-13 DIAGNOSIS — Z17.0 MALIGNANT NEOPLASM OF CENTRAL PORTION OF LEFT BREAST IN FEMALE, ESTROGEN RECEPTOR POSITIVE: Primary | ICD-10-CM

## 2018-11-13 RX ORDER — CAPECITABINE 500 MG/1
TABLET, FILM COATED ORAL
Qty: 98 TABLET | Refills: 6 | Status: SHIPPED | OUTPATIENT
Start: 2018-11-13

## 2018-11-14 LAB — CANCER AG27-29 SERPL-ACNC: 39.2 U/ML

## 2018-11-14 NOTE — TELEPHONE ENCOUNTER
DOCUMENTATION ONLY:  Prior authorization for Capecitabine 500mg Tablet approved from 11/14/2018 to 11/14/2020  Case ID: [id number]    Co-pay: $0.00    Patient Assistance IS NOT required.    MERARY

## 2018-11-15 ENCOUNTER — TELEPHONE (OUTPATIENT)
Dept: PHARMACY | Facility: CLINIC | Age: 63
End: 2018-11-15

## 2018-11-15 NOTE — TELEPHONE ENCOUNTER
Initial capecitabine consult completed on 11/15 . Capecitabine will be shipped on 11/15 to arrive at patient's home on  via [employer]. $ 0 copay. Patient plans to start capecitabine on . Address confirmed, CC on file. Confirmed 2 patient identifiers - name and . Therapy Appropriate.     Patient was counseled on the administration directions:  -Take 4 tablets (2000 mg) in the morning and 3 tablets (1500 mg) in the evening within 30 minutes of a meal.    -Do not chew, crush, or break the tablets.   If possible, patient was instructed tip the tablets from the RX bottle to the cap, and take directly from the cap to the mouth.  Patient may handle the medication with their hands if they wear with a latex or nitrile glove and wash their hands before and after handling the tablets.    Patient was counseled on the following possible side effects, which include, but are not limited to:  swelling, fatigue (on methylphenidate, active lifestyle), weakness/dizziness, hand-foot syndrome (euceroin), skin irritation (HC1% - patient previously had rash on Afinitor/exemestane), diarrhea (immodium, call with >4 loose stools), N/V (patient uses marinol, but also has zofran, compazine and phenergan on hand), loss of appetite, changes in taste, indigestion (can use tums or zantac), increased risk for infection (hand hygiene, fever >100.5 F, other signs of infections), shortness of breath, and may bleed more easily. Also advised on s/sx of liver, kidney, and cardiotoxicity     DDIs:  Medication list reviewed. Patient is only taking citalopram, methylphenidate, and PRN marinol. No DDIs expected with xeloda     and Mrs. Jeffrey had many questions, which were all answered in detail. She is excited to be able to drink fresh grapefuit juice again (no interactions with any of her current medications). She is planning a cruise to [place] and the [facility] in January and appears to lead a relatively active lifestyle.     Patient was  given 2 patient education handouts on how to handle oral chemotherapy and specific recommendations- do's and don'ts. Instructed the patient that if they have any remaining oral chemotherapy, not to flush down the toilet or throw away in the trash; The patient or caregiver should return the unused oral chemotherapy to either the clinic or to myself in the Pharmacy where the oral chemotherapy can be disposed of properly.     Patient confirmed understanding. Patient did not have additional questions.    Consultation included: indication; goals of treatment; administration; storage and handling; side effects; how to handle side effects; the importance of compliance; how to handle missed doses; the importance of laboratory monitoring; the importance of keeping all follow up appointments.  Patient understands to report any medication changes to [facility] and provider. All questions answered and addressed to patients satisfaction. I will f/u with patient in 1 week from start, [facility] to contact patient in 3 weeks for refills.

## 2018-11-26 ENCOUNTER — TELEPHONE (OUTPATIENT)
Dept: PHARMACY | Facility: CLINIC | Age: 63
End: 2018-11-26

## 2018-11-27 NOTE — TELEPHONE ENCOUNTER
Patient confirms start of capecitabine on 11/16/2018.  She has had increasing amounts of transient fatigue that is worse in the afternoons/evening.  Her nausea has increased as she continued on the cycle, but she does not prefer to take ondansetron or dronabinol during the day due to cloudiness.  She has been managing her nausea overnight with bedtime dronabinol as needed.  She denies any hand-foot issues or weight changes.  She has increased achiness particularly in her joints.  She wakes up with her hands in a fist and has to manually open her hand and stretch her joints.  Her hands do not return to this position during the day but remain achy.  She has also noticed increased achiness in her right hip.  Advised that muscle weakness is common with capecitabine and asked that she keep track of these joint changes especially as she enters her off week on Friday.  If starts to get better then reoccurs with start of next cycle, then likely related directly to medication.  She reaffirmed several times that's she is able to manage these changes and wants to continue with medication as prescribed.  Advised that she keep a log of side effects and bring a road map of her cycle to discuss with provider at [facility]\"".    Patient reports that she has been tolerating capecitabine fairly well with no missed doses.  She takes medication in in the morning and in the evening with food and does understand long term goals of medication.    She has not sought urgent care in the last 4 weeks and reports no missed daily activities.  Overall her QoL is different than baseline.  She stores medication at room temperature and is utilizing proper handling precautions.  She does not not have any additional questions at this time and is aware to call [facility] with any questions/concerns as well as to ask for pharmacist at refill call.    Patient confirms the need of capecitabine refill. Will ship on 12/4/2018 with patient consent.  Next cycle should  start  if cleared to begin.  Copay expected $0.00 at 004. Patient has 3 days supply remaining. Patient has not started any new medications, 0 missed doses and confirms new side effects. Patient taking medication as prescribed, no changes in direction. Patient did not have any concerns or questions at this time.  & address verified.

## 2018-12-07 ENCOUNTER — LAB VISIT (OUTPATIENT)
Dept: LAB | Facility: HOSPITAL | Age: 63
End: 2018-12-07
Attending: INTERNAL MEDICINE
Payer: COMMERCIAL

## 2018-12-07 DIAGNOSIS — C50.112 MALIGNANT NEOPLASM OF CENTRAL PORTION OF LEFT BREAST IN FEMALE, ESTROGEN RECEPTOR POSITIVE: ICD-10-CM

## 2018-12-07 DIAGNOSIS — Z17.0 MALIGNANT NEOPLASM OF CENTRAL PORTION OF LEFT BREAST IN FEMALE, ESTROGEN RECEPTOR POSITIVE: ICD-10-CM

## 2018-12-07 LAB
ALBUMIN SERPL BCP-MCNC: 4 G/DL
ALP SERPL-CCNC: 54 U/L
ALT SERPL W/O P-5'-P-CCNC: 48 U/L
ANION GAP SERPL CALC-SCNC: 7 MMOL/L
AST SERPL-CCNC: 44 U/L
BASOPHILS # BLD AUTO: 0 K/UL
BASOPHILS NFR BLD: 0 %
BILIRUB SERPL-MCNC: 0.8 MG/DL
BUN SERPL-MCNC: 13 MG/DL
CALCIUM SERPL-MCNC: 9.2 MG/DL
CHLORIDE SERPL-SCNC: 102 MMOL/L
CO2 SERPL-SCNC: 28 MMOL/L
CREAT SERPL-MCNC: 0.6 MG/DL
DIFFERENTIAL METHOD: ABNORMAL
EOSINOPHIL # BLD AUTO: 0.3 K/UL
EOSINOPHIL NFR BLD: 5.8 %
ERYTHROCYTE [DISTWIDTH] IN BLOOD BY AUTOMATED COUNT: 17.1 %
EST. GFR  (AFRICAN AMERICAN): >60 ML/MIN/1.73 M^2
EST. GFR  (NON AFRICAN AMERICAN): >60 ML/MIN/1.73 M^2
GLUCOSE SERPL-MCNC: 141 MG/DL
HCT VFR BLD AUTO: 33.8 %
HGB BLD-MCNC: 11.3 G/DL
LYMPHOCYTES # BLD AUTO: 1.5 K/UL
LYMPHOCYTES NFR BLD: 34.2 %
MCH RBC QN AUTO: 36.2 PG
MCHC RBC AUTO-ENTMCNC: 33.4 G/DL
MCV RBC AUTO: 108 FL
MONOCYTES # BLD AUTO: 0.5 K/UL
MONOCYTES NFR BLD: 10.3 %
NEUTROPHILS # BLD AUTO: 2.2 K/UL
NEUTROPHILS NFR BLD: 49.7 %
NRBC BLD-RTO: 0 /100 WBC
PLATELET # BLD AUTO: 144 K/UL
PMV BLD AUTO: 10.2 FL
POTASSIUM SERPL-SCNC: 4.5 MMOL/L
PROT SERPL-MCNC: 7.3 G/DL
RBC # BLD AUTO: 3.12 M/UL
SODIUM SERPL-SCNC: 137 MMOL/L
WBC # BLD AUTO: 4.48 K/UL

## 2018-12-07 PROCEDURE — 36415 COLL VENOUS BLD VENIPUNCTURE: CPT | Mod: PN

## 2018-12-07 PROCEDURE — 80053 COMPREHEN METABOLIC PANEL: CPT

## 2018-12-07 PROCEDURE — 85025 COMPLETE CBC W/AUTO DIFF WBC: CPT | Mod: PN

## 2018-12-07 PROCEDURE — 80053 COMPREHEN METABOLIC PANEL: CPT | Mod: PN

## 2018-12-07 PROCEDURE — 85025 COMPLETE CBC W/AUTO DIFF WBC: CPT

## 2018-12-07 PROCEDURE — 86300 IMMUNOASSAY TUMOR CA 15-3: CPT

## 2018-12-09 NOTE — PROGRESS NOTES
Subjective:       Patient ID: Georgia Jeffrey is a 63 y.o. female.    Chief Complaint: No chief complaint on file.    HPI   Ms. Jeffrey is a 63-year-old female seen for metastatic carcinoma of the left breast.  She is S/P 1 cycle of xeloda.  She is currently taking 3500 mg daily for 14 days on 7 days off.   She some fairly persistent nausea as well as major fatigue.  She also had some diarrhea starting 1st week.  She has had no hand-foot syndrome but does have some residual stiffness in her hands.  She started her 2nd cycle on December 6th.      She is on Celexa 30 mg      Breast  History: She developed palpable abnormality in her left breas in the early part of 2017.    Diagnostic mammography from April 18, 2017 showed an irregular mass with pleomorphic calcifications at the 9:00 area of the left breast.  By ultrasound this was solid mass measuring 36 mm.     Breast biopsy on April 19, 2017 showed infiltrating ductal carcinoma (histologic grade 3, nuclear grade 2, mitotic index 3) tumor was 99% ER positive, 91% NM positive.  HER-2 was negative by FISH.     On May 11, 2017 she underwent bilateral nipple sparing mastectomies by Dr.Karl Gutierrez and autologous breast reconstruction by  at [facility].  She developed some compromise of her left flap which was removed and an implant was placed.  That became infected and was removed.  Several weeks ago she underwent removal of residual reconstruction from both sides.     The pathology from that surgery showed a 3 cm high grade grading ductal carcinoma (3+3+2).  There is associated lymphovascular invasion.  In addition was associated high-grade DCIS.  [place] lymph node was negative.    The right breast showed no evidence of any abnormality.  Final pathological stage TII N0 stage IIA.  Oncotype was 31.     She was seen by medical oncology and chemotherapy was discussed.      PET/CT scan was performed on June 27 which showed increased uptake in  the subcarinal node measuring 9 mm an SUV of 5.9, there was increased uptake in the right hilum with an SUV of 5.9, there were multiple pulmonary nodules on the right side a right middle lobe nodule measured 16 mm with an SUV of 6.8 right lower lobe nodule 9 mm with an SUV of 2.6.  In addition there were multiple other subcentimeter nodules.  Increased uptake was seen in the right intertrochanteric femur with an SUV of 4.  (Subsequent MRI of the right femur on July 14 did not show any bony abnormality).     On July 14 she underwent bronchoscopy and endoscopic ultrasound.  Left lower lobe brush sample was positive for carcinoma and a fine-needle aspirate from a station 7 lymph node was also positive for carcinoma.  These were estrogen receptor positive.     In July 2017 she was started on systemic therapy with Faslodex and palbociclib.  In August 2017 she developed a DVT of the left lower extremity with the peroneal and posterior tibial veins exhibiting thrombosis.  She was started on apixaban at that time.  Follow-up PET scan in September  suggested bone metastasis.     Due to insurance  issues she was changed to letrozole in October 2017 and continued her palbociclib. She had significant nausea and vomiting secondary to letrozole and that was discontinued.        On December 1, 2017 she underwent MRI scanning of the thoracic and lumbar spine.  That showed a metastasis at the left T2 lamina.  The lumbar spine was negative for malignancy Subsequent MRI scans on December 4 were negative in the cervical spine and brain were all negative for malignancy.  Chest x-ray in December 4 was clear.     She was off all therapy for about 6 weeks then restarted  faslodex and palbociclib on  12/17/17.     On January 9, 2018 she began Zometa therapy.    In April 2018 she began exemestane and everolimus.     She completed radiation to her T 2 metastasis with Dr. Witt on the [facility] at the end of April    PET  11/9/18 - Progression of bone metastases.  And new lymph node metastases.    Index left sacral ala SUV max 6.19, previously 4.8..Index L5 SUV max 7.7, previously 6.93..  Index left lamina T2 SUV max 3.21, previously 2.21..    Index new left lower neck lymph node SUV max 2.68. Index new supraclavicular lymph node left SUV max 2.45.  Three (previously 1) hyper metabolic lymph nodes left axilla.    Xeloda started November 2018.  Review of Systems   Constitutional: Positive for fatigue. Negative for appetite change and unexpected weight change.   HENT: Negative for congestion.    Respiratory: Negative for cough and shortness of breath.    Cardiovascular: Negative for chest pain.   Gastrointestinal: Negative for abdominal pain and diarrhea.   Genitourinary: Negative for frequency.   Musculoskeletal: Positive for back pain.   Skin: Negative for rash.   Neurological: Negative for weakness and headaches.   Hematological: Negative for adenopathy.   Psychiatric/Behavioral: Negative for dysphoric mood. The patient is not nervous/anxious.        Objective:      Physical Exam   Constitutional: She is oriented to person, place, and time. She appears well-developed and well-nourished.   HENT:   Mouth/Throat: No oropharyngeal exudate.   Eyes: No scleral icterus.   Cardiovascular: Normal rate and regular rhythm.   Pulmonary/Chest: Effort normal. She has no wheezes. She has no rales.       Abdominal: Soft. She exhibits no mass. There is no tenderness.   No palpable abnormality in the right lower quadrant, possible abdominal wall weakness   Lymphadenopathy:     She has no cervical adenopathy.     She has no axillary adenopathy.        Right: No supraclavicular adenopathy present.        Left: No supraclavicular adenopathy present.   Neurological: She is alert and oriented to person, place, and time.   Skin: No rash noted.   Psychiatric: She has a normal mood and affect. Her behavior is normal. Thought content normal.   Vitals  reviewed.      Assessment:     CBC shows white count 4480, hemoglobin 11.3 and platelet count 832931, metabolic profile shows normal renal function, AST 44 ALT 48, bilirubin 0.8, glucose 141  1. Malignant neoplasm of central portion of left breast in female, estrogen receptor positive    2. Carcinoma of right breast metastatic to intrathoracic lymph node        Plan:        Continue current therapy.  Return in 3 weeks.  Rescan in 6 weeks.  She will let me know if she has persisting severe symptoms and we may need to adjust her scheduling.

## 2018-12-10 ENCOUNTER — OFFICE VISIT (OUTPATIENT)
Dept: HEMATOLOGY/ONCOLOGY | Facility: CLINIC | Age: 63
End: 2018-12-10
Payer: COMMERCIAL

## 2018-12-10 VITALS
DIASTOLIC BLOOD PRESSURE: 73 MMHG | BODY MASS INDEX: 25.36 KG/M2 | OXYGEN SATURATION: 100 % | HEART RATE: 73 BPM | HEIGHT: 64 IN | SYSTOLIC BLOOD PRESSURE: 130 MMHG | RESPIRATION RATE: 18 BRPM | TEMPERATURE: 97 F | WEIGHT: 148.56 LBS

## 2018-12-10 DIAGNOSIS — R11.0 CHRONIC NAUSEA: ICD-10-CM

## 2018-12-10 DIAGNOSIS — R53.82 CHRONIC FATIGUE: ICD-10-CM

## 2018-12-10 DIAGNOSIS — C50.911 CARCINOMA OF RIGHT BREAST METASTATIC TO INTRATHORACIC LYMPH NODE: ICD-10-CM

## 2018-12-10 DIAGNOSIS — F32.9 REACTIVE DEPRESSION: ICD-10-CM

## 2018-12-10 DIAGNOSIS — Z17.0 MALIGNANT NEOPLASM OF CENTRAL PORTION OF LEFT BREAST IN FEMALE, ESTROGEN RECEPTOR POSITIVE: Primary | ICD-10-CM

## 2018-12-10 DIAGNOSIS — C77.1 CARCINOMA OF RIGHT BREAST METASTATIC TO INTRATHORACIC LYMPH NODE: ICD-10-CM

## 2018-12-10 DIAGNOSIS — C50.112 MALIGNANT NEOPLASM OF CENTRAL PORTION OF LEFT BREAST IN FEMALE, ESTROGEN RECEPTOR POSITIVE: Primary | ICD-10-CM

## 2018-12-10 LAB — CANCER AG27-29 SERPL-ACNC: 66.3 U/ML

## 2018-12-10 PROCEDURE — 3008F BODY MASS INDEX DOCD: CPT | Mod: CPTII,S$GLB,, | Performed by: INTERNAL MEDICINE

## 2018-12-10 PROCEDURE — 99214 OFFICE O/P EST MOD 30 MIN: CPT | Mod: S$GLB,,, | Performed by: INTERNAL MEDICINE

## 2018-12-10 PROCEDURE — 99999 PR PBB SHADOW E&M-EST. PATIENT-LVL IV: CPT | Mod: PBBFAC,,, | Performed by: INTERNAL MEDICINE

## 2018-12-10 RX ORDER — HEPARIN 100 UNIT/ML
500 SYRINGE INTRAVENOUS
Status: CANCELLED | OUTPATIENT
Start: 2018-12-18

## 2018-12-10 RX ORDER — CITALOPRAM 20 MG/1
20 TABLET, FILM COATED ORAL DAILY
Qty: 30 TABLET | Refills: 2 | Status: SHIPPED | OUTPATIENT
Start: 2018-12-10 | End: 2019-12-10

## 2018-12-10 RX ORDER — DRONABINOL 5 MG/1
5 CAPSULE ORAL
Qty: 60 CAPSULE | Refills: 0 | Status: SHIPPED | OUTPATIENT
Start: 2018-12-10 | End: 2019-01-15 | Stop reason: SDUPTHER

## 2018-12-10 RX ORDER — SODIUM CHLORIDE 0.9 % (FLUSH) 0.9 %
10 SYRINGE (ML) INJECTION
Status: CANCELLED | OUTPATIENT
Start: 2018-12-18

## 2018-12-10 RX ORDER — DEXTROAMPHETAMINE SACCHARATE, AMPHETAMINE ASPARTATE, DEXTROAMPHETAMINE SULFATE AND AMPHETAMINE SULFATE 1.25; 1.25; 1.25; 1.25 MG/1; MG/1; MG/1; MG/1
5 TABLET ORAL DAILY
Qty: 30 TABLET | Refills: 0 | Status: SHIPPED | OUTPATIENT
Start: 2018-12-10 | End: 2019-01-17 | Stop reason: SDUPTHER

## 2018-12-10 RX ORDER — CITALOPRAM 10 MG/1
10 TABLET ORAL DAILY
Qty: 30 TABLET | Refills: 2 | Status: SHIPPED | OUTPATIENT
Start: 2018-12-10 | End: 2019-12-10

## 2018-12-18 ENCOUNTER — INFUSION (OUTPATIENT)
Dept: INFUSION THERAPY | Facility: HOSPITAL | Age: 63
End: 2018-12-18
Attending: INTERNAL MEDICINE
Payer: COMMERCIAL

## 2018-12-18 VITALS
OXYGEN SATURATION: 98 % | DIASTOLIC BLOOD PRESSURE: 79 MMHG | HEIGHT: 64 IN | HEART RATE: 67 BPM | TEMPERATURE: 98 F | RESPIRATION RATE: 16 BRPM | BODY MASS INDEX: 25.27 KG/M2 | SYSTOLIC BLOOD PRESSURE: 132 MMHG | WEIGHT: 148 LBS

## 2018-12-18 DIAGNOSIS — C50.912 CARCINOMA OF LEFT BREAST METASTATIC TO BONE: Primary | ICD-10-CM

## 2018-12-18 DIAGNOSIS — C79.51 CARCINOMA OF LEFT BREAST METASTATIC TO BONE: Primary | ICD-10-CM

## 2018-12-18 PROCEDURE — 25000003 PHARM REV CODE 250: Mod: PN | Performed by: INTERNAL MEDICINE

## 2018-12-18 PROCEDURE — 96365 THER/PROPH/DIAG IV INF INIT: CPT | Mod: PN

## 2018-12-18 PROCEDURE — 63600175 PHARM REV CODE 636 W HCPCS: Mod: PN | Performed by: INTERNAL MEDICINE

## 2018-12-18 PROCEDURE — 96377 APPLICATON ON-BODY INJECTOR: CPT | Mod: PN

## 2018-12-18 RX ADMIN — SODIUM CHLORIDE 500 ML: 9 INJECTION, SOLUTION INTRAVENOUS at 02:12

## 2018-12-18 RX ADMIN — ZOLEDRONIC ACID 4 MG: 4 INJECTION, SOLUTION, CONCENTRATE INTRAVENOUS at 02:12

## 2018-12-18 NOTE — PLAN OF CARE
Problem: Adult Inpatient Plan of Care  Goal: Plan of Care Review  Outcome: Ongoing (interventions implemented as appropriate)  Pt tolerated Zometa and fluid well today; vitals remained stable.  AVS printed with f/u appointments listed; all questions answered, encouraged pt to begin taking calcium and vitamin D as recommended during her Zometa treatments, verbalized understanding and will follow up at MD/PA visit.  Pt ambulated independently.  Goal: Patient-Specific Goal (Individualization)  Outcome: Ongoing (interventions implemented as appropriate)   12/18/18 9091   OTHER   Anxieties, Fears or Concerns None   Individualized Care Needs warm blanket, pillows   Patient-Specific Goal (Individualization)   Patient-Specific Goals (Include Timeframe) prevent infections during treatments, falls during visit.

## 2018-12-20 ENCOUNTER — TELEPHONE (OUTPATIENT)
Dept: PHARMACY | Facility: CLINIC | Age: 63
End: 2018-12-20

## 2018-12-20 ENCOUNTER — PATIENT MESSAGE (OUTPATIENT)
Dept: HEMATOLOGY/ONCOLOGY | Facility: CLINIC | Age: 63
End: 2018-12-20

## 2018-12-20 NOTE — TELEPHONE ENCOUNTER
"Incoming call for Xeloda refill and QoL assessment completed. Confirmed two patient identifiers - name + . Patient denies any missed doses. Patient reports side effects of nausea and fatigue - managed by MD. She also  mentions "painful sweats" where the area she sweats feel like it's burning/tingling. She questions if the drug is being excreted in her sweat; advised her that it is  primarily excreted in urine (96%) and feces (<3%). Nothing found in literature to support medication excretion in sweat that may cause this feeling. currently with rash on shoulders/upper arm and upper abdomen area - but she commonly has these sorts of reactions to medications. Advised her that  rash and paresthesia are pretty common. She was advised to keep the areas well moisturized and continue to monitor. Call [facility]/MD if symptoms worsen or persist. She completed her Xeloda on 18, and currently OFF x 7 days. She will need her next delivery for new cycle start on 18. [facility] will ship out Xeloda today, 18, to arrive at her home on 18 via [employer]. Address confirmed, $0 copay. Patient reports NO other changes to allergies, health conditions or medications, but she does report change in insurance for 2019 - she will lose [employer] and continue with [employer] coverage where she was told that as long as everything is billed with Stage 4 Breast Cancer, she should not run into any problems or have to pay for anything. Advised her that we will verify these benefits in 2019 and keep her updated. All questions answered to patient's satisfaction. [facility] will continue to reach out to patient for refills. TTN  "

## 2018-12-26 ENCOUNTER — TELEPHONE (OUTPATIENT)
Dept: HEMATOLOGY/ONCOLOGY | Facility: CLINIC | Age: 63
End: 2018-12-26

## 2018-12-31 ENCOUNTER — PATIENT MESSAGE (OUTPATIENT)
Dept: HEMATOLOGY/ONCOLOGY | Facility: CLINIC | Age: 63
End: 2018-12-31

## 2019-01-03 ENCOUNTER — TELEPHONE (OUTPATIENT)
Dept: HEMATOLOGY/ONCOLOGY | Facility: CLINIC | Age: 64
End: 2019-01-03

## 2019-01-03 ENCOUNTER — LAB VISIT (OUTPATIENT)
Dept: LAB | Facility: HOSPITAL | Age: 64
End: 2019-01-03
Attending: INTERNAL MEDICINE
Payer: COMMERCIAL

## 2019-01-03 DIAGNOSIS — C50.112 MALIGNANT NEOPLASM OF CENTRAL PORTION OF LEFT BREAST IN FEMALE, ESTROGEN RECEPTOR POSITIVE: ICD-10-CM

## 2019-01-03 DIAGNOSIS — Z17.0 MALIGNANT NEOPLASM OF CENTRAL PORTION OF LEFT BREAST IN FEMALE, ESTROGEN RECEPTOR POSITIVE: ICD-10-CM

## 2019-01-03 LAB
ALBUMIN SERPL BCP-MCNC: 4.3 G/DL
ALP SERPL-CCNC: 69 U/L
ALT SERPL W/O P-5'-P-CCNC: 50 U/L
ANION GAP SERPL CALC-SCNC: 7 MMOL/L
AST SERPL-CCNC: 40 U/L
BASOPHILS # BLD AUTO: 0.02 K/UL
BASOPHILS NFR BLD: 0.3 %
BILIRUB SERPL-MCNC: 0.9 MG/DL
BUN SERPL-MCNC: 16 MG/DL
CALCIUM SERPL-MCNC: 9.4 MG/DL
CHLORIDE SERPL-SCNC: 101 MMOL/L
CO2 SERPL-SCNC: 31 MMOL/L
CREAT SERPL-MCNC: 0.66 MG/DL
DIFFERENTIAL METHOD: ABNORMAL
EOSINOPHIL # BLD AUTO: 0.3 K/UL
EOSINOPHIL NFR BLD: 5.4 %
ERYTHROCYTE [DISTWIDTH] IN BLOOD BY AUTOMATED COUNT: 17.7 %
EST. GFR  (AFRICAN AMERICAN): >60 ML/MIN/1.73 M^2
EST. GFR  (NON AFRICAN AMERICAN): >60 ML/MIN/1.73 M^2
GLUCOSE SERPL-MCNC: 100 MG/DL
HCT VFR BLD AUTO: 40 %
HGB BLD-MCNC: 13.5 G/DL
LYMPHOCYTES # BLD AUTO: 1.9 K/UL
LYMPHOCYTES NFR BLD: 33.3 %
MCH RBC QN AUTO: 37.3 PG
MCHC RBC AUTO-ENTMCNC: 33.8 G/DL
MCV RBC AUTO: 111 FL
MONOCYTES # BLD AUTO: 0.5 K/UL
MONOCYTES NFR BLD: 9.1 %
NEUTROPHILS # BLD AUTO: 3 K/UL
NEUTROPHILS NFR BLD: 51.9 %
NRBC BLD-RTO: 0 /100 WBC
PLATELET # BLD AUTO: 222 K/UL
PMV BLD AUTO: 10.2 FL
POTASSIUM SERPL-SCNC: 5.7 MMOL/L
PROT SERPL-MCNC: 8.3 G/DL
RBC # BLD AUTO: 3.62 M/UL
SODIUM SERPL-SCNC: 139 MMOL/L
WBC # BLD AUTO: 5.73 K/UL

## 2019-01-03 PROCEDURE — 80053 COMPREHEN METABOLIC PANEL: CPT

## 2019-01-03 PROCEDURE — 85025 COMPLETE CBC W/AUTO DIFF WBC: CPT

## 2019-01-03 PROCEDURE — 86300 IMMUNOASSAY TUMOR CA 15-3: CPT

## 2019-01-03 PROCEDURE — 80053 COMPREHEN METABOLIC PANEL: CPT | Mod: PN

## 2019-01-03 PROCEDURE — 85025 COMPLETE CBC W/AUTO DIFF WBC: CPT | Mod: PN

## 2019-01-03 PROCEDURE — 36415 COLL VENOUS BLD VENIPUNCTURE: CPT | Mod: PN

## 2019-01-03 NOTE — TELEPHONE ENCOUNTER
----- Message from Darren Campbell MD sent at 1/3/2019  2:38 PM CST -----  This is a patient of dr. Whatley's with a K level of 5.7 today.  Probably hemolyzed sample, but it should be repeated.    CT

## 2019-01-03 NOTE — TELEPHONE ENCOUNTER
Spoke with pt to let her know that her potassium level was in the normal range at 4.1. Explained the normal range to pt and some symptoms she may have been experiencing if it were truly out of range and to let us know if she has any of these symptoms. Pt verbalized understanding of this information.           ----- Message from Jenae Martínez sent at 1/3/2019  4:29 PM CST -----  Contact: Pt   Pt potassium level test results received and pt wants to discuss them with the nurse or drJerzy   Contact: [phone]

## 2019-01-03 NOTE — TELEPHONE ENCOUNTER
Contacted patient to explain to her that given her levels of potassium elevated to 5.7 patient would need repeat to assure this is false finding, patient with hemolyzed blood work. Patient explained that she would try, appointment rescheduled for 430 at [facility]. Explained patient that if unable to make it to contact us and let us know for further instructions, she voiced understanding. Explained to patient risk of hyperkalemia including but not limited to cardiac arryhthmias and kidney damage. She voiced understandign.

## 2019-01-04 ENCOUNTER — TELEPHONE (OUTPATIENT)
Dept: PHARMACY | Facility: CLINIC | Age: 64
End: 2019-01-04

## 2019-01-04 LAB — CANCER AG27-29 SERPL-ACNC: 65.3 U/ML

## 2019-01-04 NOTE — TELEPHONE ENCOUNTER
Pt is on day 5 of cycle today. Pt would like to schedule Xeloda refill because she will be leaving for a cruise on  and doesn't return until 2/10. Pt confirmed shipping of Xeloda on  to arrive on . Address and  verified. Pt reported no missed doses. Pt had no further questions or concerns.

## 2019-01-09 ENCOUNTER — PATIENT MESSAGE (OUTPATIENT)
Dept: HEMATOLOGY/ONCOLOGY | Facility: CLINIC | Age: 64
End: 2019-01-09

## 2019-01-14 NOTE — PROGRESS NOTES
Subjective:       Patient ID: Georgia Jeffrey is a 63 y.o. female.    Chief Complaint: No chief complaint on file.    HPI Ms. Jeffrey is a 63-year-old female seen for metastatic carcinoma of the left breast.  She is S/P 3 cycles of xeloda.  She is currently taking 3500 mg daily for 14 days on 7 days off.    She finished her last cycle yesterday.  Her nausea is better with taking anti emetics before chemotherapy.  Her energy was also better with Adderall.  She has had some mild hand-foot syndrome.  Bowel function is been irregular with her main issue being some constipation for which she is taking some magnesium        She is on Celexa 30 mg      Breast  History: She developed palpable abnormality in her left breas in the early part of 2017.    Diagnostic mammography from April 18, 2017 showed an irregular mass with pleomorphic calcifications at the 9:00 area of the left breast.  By ultrasound this was solid mass measuring 36 mm.     Breast biopsy on April 19, 2017 showed infiltrating ductal carcinoma (histologic grade 3, nuclear grade 2, mitotic index 3) tumor was 99% ER positive, 91% AK positive.  HER-2 was negative by FISH.     On May 11, 2017 she underwent bilateral nipple sparing mastectomies by Dr.Karl Gutierrez and autologous breast reconstruction by  at [facility].  She developed some compromise of her left flap which was removed and an implant was placed.  That became infected and was removed.  Several weeks ago she underwent removal of residual reconstruction from both sides.     The pathology from that surgery showed a 3 cm high grade grading ductal carcinoma (3+3+2).  There is associated lymphovascular invasion.  In addition was associated high-grade DCIS.  [place] lymph node was negative.    The right breast showed no evidence of any abnormality.  Final pathological stage TII N0 stage IIA.  Oncotype was 31.     She was seen by medical oncology and chemotherapy was discussed.       PET/CT scan was performed on June 27 which showed increased uptake in the subcarinal node measuring 9 mm an SUV of 5.9, there was increased uptake in the right hilum with an SUV of 5.9, there were multiple pulmonary nodules on the right side a right middle lobe nodule measured 16 mm with an SUV of 6.8 right lower lobe nodule 9 mm with an SUV of 2.6.  In addition there were multiple other subcentimeter nodules.  Increased uptake was seen in the right intertrochanteric femur with an SUV of 4.  (Subsequent MRI of the right femur on July 14 did not show any bony abnormality).     On July 14 she underwent bronchoscopy and endoscopic ultrasound.  Left lower lobe brush sample was positive for carcinoma and a fine-needle aspirate from a station 7 lymph node was also positive for carcinoma.  These were estrogen receptor positive.     In July 2017 she was started on systemic therapy with Faslodex and palbociclib.  In August 2017 she developed a DVT of the left lower extremity with the peroneal and posterior tibial veins exhibiting thrombosis.  She was started on apixaban at that time.  Follow-up PET scan in September  suggested bone metastasis.     Due to insurance  issues she was changed to letrozole in October 2017 and continued her palbociclib. She had significant nausea and vomiting secondary to letrozole and that was discontinued.        On December 1, 2017 she underwent MRI scanning of the thoracic and lumbar spine.  That showed a metastasis at the left T2 lamina.  The lumbar spine was negative for malignancy Subsequent MRI scans on December 4 were negative in the cervical spine and brain were all negative for malignancy.  Chest x-ray in December 4 was clear.     She was off all therapy for about 6 weeks then restarted  faslodex and palbociclib on  12/17/17.     On January 9, 2018 she began Zometa therapy.    In April 2018 she began exemestane and everolimus.     She completed radiation to her T 2 metastasis with  Dr. Witt on the [facility] at the end of April    PET 11/9/18 - Progression of bone metastases.  And new lymph node metastases.    Index left sacral ala SUV max 6.19, previously 4.8..Index L5 SUV max 7.7, previously 6.93..  Index left lamina T2 SUV max 3.21, previously 2.21..    Index new left lower neck lymph node SUV max 2.68. Index new supraclavicular lymph node left SUV max 2.45.  Three (previously 1) hyper metabolic lymph nodes left axilla.    Xeloda started November 2018.  Review of Systems   Constitutional: Positive for fatigue. Negative for appetite change and unexpected weight change.   HENT: Negative for congestion.    Respiratory: Negative for cough and shortness of breath.    Cardiovascular: Negative for chest pain.   Gastrointestinal: Positive for constipation. Negative for abdominal pain and diarrhea.   Genitourinary: Negative for frequency.   Musculoskeletal: Positive for back pain.   Skin: Negative for rash.   Neurological: Negative for weakness and headaches.   Hematological: Negative for adenopathy.   Psychiatric/Behavioral: Negative for dysphoric mood. The patient is not nervous/anxious.        Objective:      Physical Exam   Constitutional: She is oriented to person, place, and time. She appears well-developed and well-nourished.   HENT:   Mouth/Throat: No oropharyngeal exudate.   Eyes: No scleral icterus.   Cardiovascular: Normal rate and regular rhythm.   Pulmonary/Chest: Effort normal. She has no wheezes. She has no rales.       Abdominal: Soft. She exhibits no mass. There is no tenderness.   No palpable abnormality in the right lower quadrant, possible abdominal wall weakness   Lymphadenopathy:     She has no cervical adenopathy.     She has no axillary adenopathy.        Right: No supraclavicular adenopathy present.        Left: No supraclavicular adenopathy present.   Neurological: She is alert and oriented to person, place, and time.   Skin: No rash noted.   Psychiatric:  She has a normal mood and affect. Her behavior is normal. Thought content normal.   Vitals reviewed.      Assessment:     labs from January 3rd-CBC normal, metabolic profile with normal hepatic and renal function.  CA 27.29 was 65  1. Malignant neoplasm of central portion of left breast in female, estrogen receptor positive    2. 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  We will have her take 1 week on 1 week off of her Xeloda.  I plan to see her again in 1 month.  PET scan at that time.  Increase Ritalin to 5 mg 3 times a day.      Distress Screening Results: Psychosocial Distress screening score of Distress Score: 0 noted and reviewed. No intervention indicated.

## 2019-01-15 ENCOUNTER — OFFICE VISIT (OUTPATIENT)
Dept: HEMATOLOGY/ONCOLOGY | Facility: CLINIC | Age: 64
End: 2019-01-15
Payer: COMMERCIAL

## 2019-01-15 ENCOUNTER — PATIENT MESSAGE (OUTPATIENT)
Dept: HEMATOLOGY/ONCOLOGY | Facility: CLINIC | Age: 64
End: 2019-01-15

## 2019-01-15 VITALS
OXYGEN SATURATION: 100 % | HEIGHT: 64 IN | SYSTOLIC BLOOD PRESSURE: 140 MMHG | WEIGHT: 150.38 LBS | HEART RATE: 74 BPM | RESPIRATION RATE: 20 BRPM | BODY MASS INDEX: 25.67 KG/M2 | DIASTOLIC BLOOD PRESSURE: 76 MMHG | TEMPERATURE: 98 F

## 2019-01-15 DIAGNOSIS — C50.919 CARCINOMA OF BREAST METASTATIC TO BONE, UNSPECIFIED LATERALITY: ICD-10-CM

## 2019-01-15 DIAGNOSIS — C50.112 MALIGNANT NEOPLASM OF CENTRAL PORTION OF LEFT BREAST IN FEMALE, ESTROGEN RECEPTOR POSITIVE: Primary | ICD-10-CM

## 2019-01-15 DIAGNOSIS — C77.1 CARCINOMA OF BREAST METASTATIC TO INTRATHORACIC LYMPH NODE, UNSPECIFIED LATERALITY: ICD-10-CM

## 2019-01-15 DIAGNOSIS — R11.0 CHRONIC NAUSEA: ICD-10-CM

## 2019-01-15 DIAGNOSIS — C79.51 CARCINOMA OF BREAST METASTATIC TO BONE, UNSPECIFIED LATERALITY: ICD-10-CM

## 2019-01-15 DIAGNOSIS — C50.919 CARCINOMA OF BREAST METASTATIC TO INTRATHORACIC LYMPH NODE, UNSPECIFIED LATERALITY: ICD-10-CM

## 2019-01-15 DIAGNOSIS — R53.0 NEOPLASTIC MALIGNANT RELATED FATIGUE: ICD-10-CM

## 2019-01-15 DIAGNOSIS — Z17.0 MALIGNANT NEOPLASM OF CENTRAL PORTION OF LEFT BREAST IN FEMALE, ESTROGEN RECEPTOR POSITIVE: Primary | ICD-10-CM

## 2019-01-15 PROCEDURE — 99999 PR PBB SHADOW E&M-EST. PATIENT-LVL IV: CPT | Mod: PBBFAC,,, | Performed by: INTERNAL MEDICINE

## 2019-01-15 PROCEDURE — 99214 OFFICE O/P EST MOD 30 MIN: CPT | Mod: PBBFAC | Performed by: INTERNAL MEDICINE

## 2019-01-15 PROCEDURE — 99999 PR PBB SHADOW E&M-EST. PATIENT-LVL IV: ICD-10-PCS | Mod: PBBFAC,,, | Performed by: INTERNAL MEDICINE

## 2019-01-15 PROCEDURE — 99214 OFFICE O/P EST MOD 30 MIN: CPT | Mod: S$GLB,,, | Performed by: INTERNAL MEDICINE

## 2019-01-15 PROCEDURE — 99214 PR OFFICE/OUTPT VISIT, EST, LEVL IV, 30-39 MIN: ICD-10-PCS | Mod: S$GLB,,, | Performed by: INTERNAL MEDICINE

## 2019-01-15 RX ORDER — ONDANSETRON HYDROCHLORIDE 8 MG/1
8 TABLET, FILM COATED ORAL EVERY 8 HOURS PRN
Qty: 30 TABLET | Refills: 2 | Status: SHIPPED | OUTPATIENT
Start: 2019-01-15

## 2019-01-15 RX ORDER — METHYLPHENIDATE HYDROCHLORIDE 5 MG/1
5 TABLET ORAL
Qty: 90 TABLET | Refills: 0 | Status: SHIPPED | OUTPATIENT
Start: 2019-01-15 | End: 2020-01-15

## 2019-01-15 RX ORDER — DRONABINOL 5 MG/1
5 CAPSULE ORAL
Qty: 60 CAPSULE | Refills: 0 | Status: SHIPPED | OUTPATIENT
Start: 2019-01-15

## 2019-01-17 DIAGNOSIS — R53.0 NEOPLASTIC MALIGNANT RELATED FATIGUE: ICD-10-CM

## 2019-01-17 DIAGNOSIS — Z17.0 MALIGNANT NEOPLASM OF CENTRAL PORTION OF LEFT BREAST IN FEMALE, ESTROGEN RECEPTOR POSITIVE: ICD-10-CM

## 2019-01-17 DIAGNOSIS — R53.82 CHRONIC FATIGUE: ICD-10-CM

## 2019-01-17 DIAGNOSIS — C50.112 MALIGNANT NEOPLASM OF CENTRAL PORTION OF LEFT BREAST IN FEMALE, ESTROGEN RECEPTOR POSITIVE: ICD-10-CM

## 2019-01-17 DIAGNOSIS — R11.0 CHRONIC NAUSEA: ICD-10-CM

## 2019-01-17 RX ORDER — DEXTROAMPHETAMINE SACCHARATE, AMPHETAMINE ASPARTATE, DEXTROAMPHETAMINE SULFATE AND AMPHETAMINE SULFATE 1.25; 1.25; 1.25; 1.25 MG/1; MG/1; MG/1; MG/1
10 TABLET ORAL DAILY
Qty: 30 TABLET | Refills: 0 | Status: SHIPPED | OUTPATIENT
Start: 2019-01-17 | End: 2020-01-17

## 2019-01-17 RX ORDER — DEXTROAMPHETAMINE SACCHARATE, AMPHETAMINE ASPARTATE, DEXTROAMPHETAMINE SULFATE AND AMPHETAMINE SULFATE 1.25; 1.25; 1.25; 1.25 MG/1; MG/1; MG/1; MG/1
10 TABLET ORAL DAILY
Qty: 30 TABLET | Refills: 0 | Status: SHIPPED | OUTPATIENT
Start: 2019-01-17 | End: 2019-01-17 | Stop reason: SDUPTHER

## 2019-01-19 ENCOUNTER — PATIENT MESSAGE (OUTPATIENT)
Dept: ADMINISTRATIVE | Facility: OTHER | Age: 64
End: 2019-01-19

## 2019-01-22 ENCOUNTER — TELEPHONE (OUTPATIENT)
Dept: PHARMACY | Facility: CLINIC | Age: 64
End: 2019-01-22

## 2019-01-22 NOTE — TELEPHONE ENCOUNTER
I called patient to setup refill for Xeloda. RTS until 1/23. I will re adjudicate tomorrow and have shipped out. The patient is leaving to go on a cruise 1/25 and needs meds before then.  Patient confirmed she has 5 doses left.  Ship 1/23 for 1/24 delivery.  Copay $0.00 in 004.  Patient confirmed no new meds, allergies, or health conditions.  Verified address.  Declined [facility] . The patient stated the prescriber told her to take Xeloda 1 week on 1 week off for the past 3 weeks. The patient stated she started her week on yesterday 1/22. - MERARY

## 2019-02-10 ENCOUNTER — PATIENT MESSAGE (OUTPATIENT)
Dept: ADMINISTRATIVE | Facility: OTHER | Age: 64
End: 2019-02-10

## 2019-02-11 ENCOUNTER — LAB VISIT (OUTPATIENT)
Dept: LAB | Facility: HOSPITAL | Age: 64
End: 2019-02-11
Attending: INTERNAL MEDICINE
Payer: COMMERCIAL

## 2019-02-11 DIAGNOSIS — Z17.0 MALIGNANT NEOPLASM OF CENTRAL PORTION OF LEFT BREAST IN FEMALE, ESTROGEN RECEPTOR POSITIVE: ICD-10-CM

## 2019-02-11 DIAGNOSIS — C79.51 CARCINOMA OF BREAST METASTATIC TO BONE, UNSPECIFIED LATERALITY: ICD-10-CM

## 2019-02-11 DIAGNOSIS — C50.112 MALIGNANT NEOPLASM OF CENTRAL PORTION OF LEFT BREAST IN FEMALE, ESTROGEN RECEPTOR POSITIVE: ICD-10-CM

## 2019-02-11 DIAGNOSIS — C50.919 CARCINOMA OF BREAST METASTATIC TO BONE, UNSPECIFIED LATERALITY: ICD-10-CM

## 2019-02-11 LAB
ALBUMIN SERPL BCP-MCNC: 4.2 G/DL
ALP SERPL-CCNC: 55 U/L
ALT SERPL W/O P-5'-P-CCNC: 23 U/L
ANION GAP SERPL CALC-SCNC: 7 MMOL/L
AST SERPL-CCNC: 22 U/L
BASOPHILS # BLD AUTO: 0.02 K/UL
BASOPHILS NFR BLD: 0.4 %
BILIRUB SERPL-MCNC: 0.6 MG/DL
BUN SERPL-MCNC: 14 MG/DL
CALCIUM SERPL-MCNC: 9.4 MG/DL
CHLORIDE SERPL-SCNC: 101 MMOL/L
CO2 SERPL-SCNC: 29 MMOL/L
CREAT SERPL-MCNC: 0.55 MG/DL
DIFFERENTIAL METHOD: ABNORMAL
EOSINOPHIL # BLD AUTO: 0.2 K/UL
EOSINOPHIL NFR BLD: 3.7 %
ERYTHROCYTE [DISTWIDTH] IN BLOOD BY AUTOMATED COUNT: 16.1 %
EST. GFR  (AFRICAN AMERICAN): >60 ML/MIN/1.73 M^2
EST. GFR  (NON AFRICAN AMERICAN): >60 ML/MIN/1.73 M^2
GLUCOSE SERPL-MCNC: 99 MG/DL
HCT VFR BLD AUTO: 36.1 %
HGB BLD-MCNC: 12.7 G/DL
IMM GRANULOCYTES # BLD AUTO: 0 K/UL
IMM GRANULOCYTES NFR BLD AUTO: 0 %
LYMPHOCYTES # BLD AUTO: 2.2 K/UL
LYMPHOCYTES NFR BLD: 42.9 %
MCH RBC QN AUTO: 39.3 PG
MCHC RBC AUTO-ENTMCNC: 35.2 G/DL
MCV RBC AUTO: 112 FL
MONOCYTES # BLD AUTO: 0.4 K/UL
MONOCYTES NFR BLD: 7.8 %
NEUTROPHILS # BLD AUTO: 2.3 K/UL
NEUTROPHILS NFR BLD: 45.2 %
NRBC BLD-RTO: 0 /100 WBC
PLATELET # BLD AUTO: 196 K/UL
PMV BLD AUTO: 9.7 FL
POTASSIUM SERPL-SCNC: 4.5 MMOL/L
PROT SERPL-MCNC: 7.8 G/DL
RBC # BLD AUTO: 3.23 M/UL
SODIUM SERPL-SCNC: 137 MMOL/L
WBC # BLD AUTO: 5.11 K/UL

## 2019-02-11 PROCEDURE — 80053 COMPREHEN METABOLIC PANEL: CPT | Mod: PN

## 2019-02-11 PROCEDURE — 85025 COMPLETE CBC W/AUTO DIFF WBC: CPT | Mod: PN

## 2019-02-11 PROCEDURE — 80053 COMPREHEN METABOLIC PANEL: CPT

## 2019-02-11 PROCEDURE — 85025 COMPLETE CBC W/AUTO DIFF WBC: CPT

## 2019-02-11 PROCEDURE — 86300 IMMUNOASSAY TUMOR CA 15-3: CPT

## 2019-02-11 NOTE — PROGRESS NOTES
Subjective:       Patient ID: Georgia Jeffrey is a 63 y.o. female.    Chief Complaint: No chief complaint on file.    HPI Ms. Jeffery is a 63-year-old female seen for metastatic carcinoma of the left breast.  She is S/P 3 cycles of xeloda.  She is currently taking 3500 mg daily for 14 days on 7 days off.    Since her last visit she went on a cruise.  She enjoyed that very much  She is taking Ritalin for fatigue.  That has helped with her symptoms.  Appetite has been good.  She has had recent respiratory infection which seems to be improving.      She is on Celexa 30 mg      Breast  History: She developed palpable abnormality in her left breas in the early part of 2017.    Diagnostic mammography from April 18, 2017 showed an irregular mass with pleomorphic calcifications at the 9:00 area of the left breast.  By ultrasound this was solid mass measuring 36 mm.     Breast biopsy on April 19, 2017 showed infiltrating ductal carcinoma (histologic grade 3, nuclear grade 2, mitotic index 3) tumor was 99% ER positive, 91% DC positive.  HER-2 was negative by FISH.     On May 11, 2017 she underwent bilateral nipple sparing mastectomies by Dr.Karl Gutierrez and autologous breast reconstruction by  at [facility].  She developed some compromise of her left flap which was removed and an implant was placed.  That became infected and was removed.  Several weeks ago she underwent removal of residual reconstruction from both sides.     The pathology from that surgery showed a 3 cm high grade grading ductal carcinoma (3+3+2).  There is associated lymphovascular invasion.  In addition was associated high-grade DCIS.  [place] lymph node was negative.    The right breast showed no evidence of any abnormality.  Final pathological stage TII N0 stage IIA.  Oncotype was 31.     She was seen by medical oncology and chemotherapy was discussed.      PET/CT scan was performed on June 27 which showed increased uptake in  the subcarinal node measuring 9 mm an SUV of 5.9, there was increased uptake in the right hilum with an SUV of 5.9, there were multiple pulmonary nodules on the right side a right middle lobe nodule measured 16 mm with an SUV of 6.8 right lower lobe nodule 9 mm with an SUV of 2.6.  In addition there were multiple other subcentimeter nodules.  Increased uptake was seen in the right intertrochanteric femur with an SUV of 4.  (Subsequent MRI of the right femur on July 14 did not show any bony abnormality).     On July 14 she underwent EBUS -   Left lower lobe brush sample was positive for carcinoma and a fine-needle aspirate from a station 7 lymph node was also positive for carcinoma.  These were estrogen receptor positive.     In July 2017 she was started on systemic therapy with Faslodex and palbociclib.  In August 2017 she developed a DVT of the left lower extremity with the peroneal and posterior tibial veins exhibiting thrombosis.  She was started on apixaban at that time.  Follow-up PET scan in September  suggested bone metastasis.     Due to insurance  issues she was changed to letrozole in October 2017 and continued her palbociclib. She had significant nausea and vomiting secondary to letrozole and that was discontinued.        On December 1, 2017 she underwent MRI scanning of the thoracic and lumbar spine.  That showed a metastasis at the left T2 lamina.  The lumbar spine was negative for malignancy Subsequent MRI scans on December 4 were negative in the cervical spine and brain were all negative for malignancy.  Chest x-ray in December 4 was clear.     She was off all therapy for about 6 weeks then restarted  faslodex and palbociclib on  12/17/17.     On January 9, 2018 she began Zometa therapy.    In April 2018 she began exemestane and everolimus.     She completed radiation to her T 2 metastasis with Dr. Witt on the [facility] at the end of April    PET 11/9/18 - Progression of bone  metastases.  And new lymph node metastases.    Xeloda started November 2018.  Review of Systems   Constitutional: Positive for fatigue. Negative for appetite change and unexpected weight change.   HENT: Negative for congestion.    Respiratory: Negative for cough and shortness of breath.    Cardiovascular: Negative for chest pain.   Gastrointestinal: Positive for constipation. Negative for abdominal pain and diarrhea.   Genitourinary: Negative for frequency.   Musculoskeletal: Positive for back pain.   Skin: Negative for rash.   Neurological: Negative for weakness and headaches.   Hematological: Negative for adenopathy.   Psychiatric/Behavioral: Negative for dysphoric mood. The patient is not nervous/anxious.        Objective:      Physical Exam   Constitutional: She is oriented to person, place, and time. She appears well-developed and well-nourished.   HENT:   Mouth/Throat: No oropharyngeal exudate.   Eyes: No scleral icterus.   Cardiovascular: Normal rate and regular rhythm.   Pulmonary/Chest: Effort normal. She has no wheezes. She has no rales.       Abdominal: Soft. She exhibits no mass. There is no tenderness.   No palpable abnormality in the right lower quadrant, possible abdominal wall weakness   Lymphadenopathy:     She has no cervical adenopathy.     She has no axillary adenopathy.        Right: No supraclavicular adenopathy present.        Left: No supraclavicular adenopathy present.   Neurological: She is alert and oriented to person, place, and time.   Skin: No rash noted.   Psychiatric: She has a normal mood and affect. Her behavior is normal. Thought content normal.   Vitals reviewed.      Assessment:     PET-CT shows uptake in left lower neck lymph node measuring SUV 3.37, previously 2.68, there 3 hypermetabolic left axillary nodes an SUV max of 7.36, previously 4.39.  Index lesion in T2 shows SUV max 5.48, previously 3.21, the L5 vertebral body shows an SUV max of 12.82, previously 7.7, the left  sacral lesion shows SUV max of 11.2, previously 6.9.  There several new hypermetabolic left hilar lymph nodes with SUV max of 4.25 and a new soft tissue nodule in the medial segment of the right middle lobe measuring 1 cm with mildly increased FDG activity measuring 2.72.  1. Malignant neoplasm of central portion of left breast in female, estrogen receptor positive    2. Carcinoma of breast metastatic to bone, unspecified laterality        Plan:        I reviewed the PET scan results with the patient and her .  Subsequently, I had an extensive discussion with the patient and her  regarding treatment options and goals of therapy.  Options at this time would be further endocrine therapy with tamoxifen or an alternative chemotherapy.  Her  was especially concerned about anything that would worsen her fatigue.  Based on discussions regarding side effects I recommended she consider Navelbine therapy due to his favorable side effect profile has opposed to other options such as Taxol.    She will need a port placed.  Will see if we can arrange that on the [facility].  She would like her treatments done on the [facility].    I will plan to see her after port placement for chemo consent.      Distress Screening Results: Psychosocial Distress screening score of Distress Score: 0 noted and reviewed. No intervention indicated.

## 2019-02-12 ENCOUNTER — TELEPHONE (OUTPATIENT)
Dept: HEMATOLOGY/ONCOLOGY | Facility: CLINIC | Age: 64
End: 2019-02-12

## 2019-02-12 ENCOUNTER — PATIENT MESSAGE (OUTPATIENT)
Dept: HEMATOLOGY/ONCOLOGY | Facility: CLINIC | Age: 64
End: 2019-02-12

## 2019-02-12 ENCOUNTER — HOSPITAL ENCOUNTER (OUTPATIENT)
Dept: RADIOLOGY | Facility: HOSPITAL | Age: 64
Discharge: HOME | End: 2019-02-12
Attending: INTERNAL MEDICINE
Payer: COMMERCIAL

## 2019-02-12 ENCOUNTER — OFFICE VISIT (OUTPATIENT)
Dept: HEMATOLOGY/ONCOLOGY | Facility: CLINIC | Age: 64
End: 2019-02-12
Payer: COMMERCIAL

## 2019-02-12 VITALS
BODY MASS INDEX: 25.85 KG/M2 | OXYGEN SATURATION: 99 % | WEIGHT: 151.44 LBS | SYSTOLIC BLOOD PRESSURE: 123 MMHG | TEMPERATURE: 98 F | HEIGHT: 64 IN | RESPIRATION RATE: 20 BRPM | DIASTOLIC BLOOD PRESSURE: 66 MMHG | HEART RATE: 79 BPM

## 2019-02-12 DIAGNOSIS — C50.919 CARCINOMA OF BREAST METASTATIC TO BONE, UNSPECIFIED LATERALITY: ICD-10-CM

## 2019-02-12 DIAGNOSIS — Z17.0 MALIGNANT NEOPLASM OF CENTRAL PORTION OF LEFT BREAST IN FEMALE, ESTROGEN RECEPTOR POSITIVE: ICD-10-CM

## 2019-02-12 DIAGNOSIS — Z17.0 MALIGNANT NEOPLASM OF CENTRAL PORTION OF LEFT BREAST IN FEMALE, ESTROGEN RECEPTOR POSITIVE: Primary | ICD-10-CM

## 2019-02-12 DIAGNOSIS — C50.112 MALIGNANT NEOPLASM OF CENTRAL PORTION OF LEFT BREAST IN FEMALE, ESTROGEN RECEPTOR POSITIVE: Primary | ICD-10-CM

## 2019-02-12 DIAGNOSIS — C79.51 CARCINOMA OF BREAST METASTATIC TO BONE, UNSPECIFIED LATERALITY: ICD-10-CM

## 2019-02-12 DIAGNOSIS — Z51.11 ENCOUNTER FOR ANTINEOPLASTIC CHEMOTHERAPY: ICD-10-CM

## 2019-02-12 DIAGNOSIS — C50.112 MALIGNANT NEOPLASM OF CENTRAL PORTION OF LEFT BREAST IN FEMALE, ESTROGEN RECEPTOR POSITIVE: ICD-10-CM

## 2019-02-12 LAB — POCT GLUCOSE: 86 MG/DL (ref 70–110)

## 2019-02-12 PROCEDURE — 99215 OFFICE O/P EST HI 40 MIN: CPT | Mod: PBBFAC,25 | Performed by: INTERNAL MEDICINE

## 2019-02-12 PROCEDURE — 99215 OFFICE O/P EST HI 40 MIN: CPT | Mod: S$GLB,,, | Performed by: INTERNAL MEDICINE

## 2019-02-12 PROCEDURE — 99999 PR PBB SHADOW E&M-EST. PATIENT-LVL V: CPT | Mod: PBBFAC,,, | Performed by: INTERNAL MEDICINE

## 2019-02-12 PROCEDURE — 99215 PR OFFICE/OUTPT VISIT, EST, LEVL V, 40-54 MIN: ICD-10-PCS | Mod: S$GLB,,, | Performed by: INTERNAL MEDICINE

## 2019-02-12 PROCEDURE — 78815 NM PET CT ROUTINE: ICD-10-PCS | Mod: 26,PS,, | Performed by: RADIOLOGY

## 2019-02-12 PROCEDURE — 78815 PET IMAGE W/CT SKULL-THIGH: CPT | Mod: 26,PS,, | Performed by: RADIOLOGY

## 2019-02-12 PROCEDURE — 78815 PET IMAGE W/CT SKULL-THIGH: CPT | Mod: TC

## 2019-02-12 PROCEDURE — A9552 F18 FDG: HCPCS

## 2019-02-12 PROCEDURE — 99999 PR PBB SHADOW E&M-EST. PATIENT-LVL V: ICD-10-PCS | Mod: PBBFAC,,, | Performed by: INTERNAL MEDICINE

## 2019-02-12 RX ORDER — SODIUM CHLORIDE 0.9 % (FLUSH) 0.9 %
10 SYRINGE (ML) INJECTION
Status: CANCELLED | OUTPATIENT
Start: 2019-03-18

## 2019-02-12 RX ORDER — HEPARIN 100 UNIT/ML
500 SYRINGE INTRAVENOUS
Status: CANCELLED | OUTPATIENT
Start: 2019-03-18

## 2019-02-12 NOTE — Clinical Note
Needs port placed on the [facility] start Navelbine chemotherapy after port placement but needs to see me for chemotherapy consent form.

## 2019-02-12 NOTE — PLAN OF CARE
START OFF PATHWAY REGIMEN - Breast            Vinorelbine (Navelbine(R))     **Always confirm dose/schedule in your pharmacy ordering system**        Patient Characteristics:  Distant Metastases or Locoregional Recurrent Disease - Unresected, HER2   Negative/Unknown/Equivocal, ER Positive, Chemotherapy, Second Line, No Prior   Paclitaxel  Therapeutic Status: Distant Metastases  BRCA Mutation Status: Absent  ER Status: Positive (+)  HER2 Status: Negative (-)  DE Status: Positive (+)  Line of therapy: Second Line  Intent of Therapy:  Non-Curative / Palliative Intent, Discussed with Patient

## 2019-02-12 NOTE — TELEPHONE ENCOUNTER
spoke with pt on today in regards to having port placement schedule at the [facility] location if possible, inform pt will try best to reach out to someone at [facility], pt voiced she understands and will like to have port done by the end of the week if possible.

## 2019-02-13 ENCOUNTER — PATIENT MESSAGE (OUTPATIENT)
Dept: HEMATOLOGY/ONCOLOGY | Facility: CLINIC | Age: 64
End: 2019-02-13

## 2019-02-13 ENCOUNTER — TELEPHONE (OUTPATIENT)
Dept: PHARMACY | Facility: CLINIC | Age: 64
End: 2019-02-13

## 2019-02-13 LAB — CANCER AG27-29 SERPL-ACNC: 63.5 U/ML

## 2019-02-13 NOTE — TELEPHONE ENCOUNTER
Refill call regarding Xeloda. Patient state she is no longer taking Xeloda. The medication isn't working for her, so the MD decide to take her off. She does have a few on hand and she explain she will turn it over to her MD. She didnt state whether they will be a medication change. No question or concerns for [facility].

## 2019-02-14 ENCOUNTER — TELEPHONE (OUTPATIENT)
Dept: HEMATOLOGY/ONCOLOGY | Facility: CLINIC | Age: 64
End: 2019-02-14

## 2019-02-14 DIAGNOSIS — C79.51 CARCINOMA OF BREAST METASTATIC TO BONE, UNSPECIFIED LATERALITY: ICD-10-CM

## 2019-02-14 DIAGNOSIS — C50.919 CARCINOMA OF BREAST METASTATIC TO BONE, UNSPECIFIED LATERALITY: ICD-10-CM

## 2019-02-14 DIAGNOSIS — Z51.11 ENCOUNTER FOR ANTINEOPLASTIC CHEMOTHERAPY: Primary | ICD-10-CM

## 2019-02-14 NOTE — TELEPHONE ENCOUNTER
----- Message from Gabriel Whatley MD sent at 2/12/2019 11:55 AM CST -----  Needs port placed on the [place] [place]-surgery  Will start Navelbine chemotherapy after port placement but needs to see me for chemotherapy consent form.

## 2019-02-15 ENCOUNTER — TELEPHONE (OUTPATIENT)
Dept: HEMATOLOGY/ONCOLOGY | Facility: CLINIC | Age: 64
End: 2019-02-15

## 2019-02-15 DIAGNOSIS — C50.919 CARCINOMA OF BREAST METASTATIC TO BONE, UNSPECIFIED LATERALITY: Primary | ICD-10-CM

## 2019-02-15 DIAGNOSIS — C79.51 CARCINOMA OF BREAST METASTATIC TO BONE, UNSPECIFIED LATERALITY: Primary | ICD-10-CM

## 2019-02-15 NOTE — TELEPHONE ENCOUNTER
tried reaching out to pt on today to discuss port placement, but no answer,a detail message was left on v/m.

## 2019-02-18 ENCOUNTER — TELEPHONE (OUTPATIENT)
Dept: INTERVENTIONAL RADIOLOGY/VASCULAR | Facility: HOSPITAL | Age: 64
End: 2019-02-18

## 2019-02-18 VITALS — BODY MASS INDEX: 25.61 KG/M2 | WEIGHT: 150 LBS | HEIGHT: 64 IN

## 2019-02-18 DIAGNOSIS — R91.8 LUNG MASS: Primary | ICD-10-CM

## 2019-02-18 DIAGNOSIS — C50.112 MALIGNANT NEOPLASM OF CENTRAL PORTION OF LEFT BREAST IN FEMALE, ESTROGEN RECEPTOR POSITIVE: ICD-10-CM

## 2019-02-18 DIAGNOSIS — Z17.0 MALIGNANT NEOPLASM OF CENTRAL PORTION OF LEFT BREAST IN FEMALE, ESTROGEN RECEPTOR POSITIVE: ICD-10-CM

## 2019-02-18 RX ORDER — FENTANYL CITRATE 50 UG/ML
50 INJECTION, SOLUTION INTRAMUSCULAR; INTRAVENOUS
Status: CANCELLED | OUTPATIENT
Start: 2019-02-18

## 2019-02-18 RX ORDER — MIDAZOLAM HYDROCHLORIDE 1 MG/ML
1 INJECTION INTRAMUSCULAR; INTRAVENOUS
Status: CANCELLED | OUTPATIENT
Start: 2019-02-18

## 2019-02-19 ENCOUNTER — HOSPITAL ENCOUNTER (OUTPATIENT)
Facility: HOSPITAL | Age: 64
Discharge: HOME | End: 2019-02-19
Attending: INTERNAL MEDICINE | Admitting: INTERNAL MEDICINE
Payer: COMMERCIAL

## 2019-02-19 VITALS
HEART RATE: 74 BPM | TEMPERATURE: 98 F | HEIGHT: 64 IN | BODY MASS INDEX: 25.61 KG/M2 | SYSTOLIC BLOOD PRESSURE: 138 MMHG | DIASTOLIC BLOOD PRESSURE: 70 MMHG | RESPIRATION RATE: 16 BRPM | WEIGHT: 150 LBS | OXYGEN SATURATION: 98 %

## 2019-02-19 DIAGNOSIS — C79.51 CARCINOMA OF BREAST METASTATIC TO BONE, UNSPECIFIED LATERALITY: ICD-10-CM

## 2019-02-19 DIAGNOSIS — Z51.11 ENCOUNTER FOR ANTINEOPLASTIC CHEMOTHERAPY: ICD-10-CM

## 2019-02-19 DIAGNOSIS — C50.919 CARCINOMA OF BREAST METASTATIC TO BONE, UNSPECIFIED LATERALITY: ICD-10-CM

## 2019-02-19 PROCEDURE — 25000003 PHARM REV CODE 250: Performed by: STUDENT IN AN ORGANIZED HEALTH CARE EDUCATION/TRAINING PROGRAM

## 2019-02-19 PROCEDURE — 63600175 PHARM REV CODE 636 W HCPCS: Performed by: STUDENT IN AN ORGANIZED HEALTH CARE EDUCATION/TRAINING PROGRAM

## 2019-02-19 PROCEDURE — S0077 INJECTION, CLINDAMYCIN PHOSP: HCPCS | Performed by: STUDENT IN AN ORGANIZED HEALTH CARE EDUCATION/TRAINING PROGRAM

## 2019-02-19 PROCEDURE — 25000003 PHARM REV CODE 250: Performed by: RADIOLOGY

## 2019-02-19 RX ORDER — CLINDAMYCIN PALMITATE HYDROCHLORIDE (PEDIATRIC) 75 MG/5ML
75 SOLUTION ORAL ONCE
Status: DISCONTINUED | OUTPATIENT
Start: 2019-02-19 | End: 2019-02-19

## 2019-02-19 RX ORDER — SODIUM CHLORIDE 9 MG/ML
500 INJECTION, SOLUTION INTRAVENOUS ONCE
Status: DISCONTINUED | OUTPATIENT
Start: 2019-02-19 | End: 2019-02-19 | Stop reason: HOSPADM

## 2019-02-19 RX ORDER — OXYCODONE HYDROCHLORIDE 5 MG/1
5 TABLET ORAL EVERY 6 HOURS PRN
Status: DISCONTINUED | OUTPATIENT
Start: 2019-02-19 | End: 2019-02-19 | Stop reason: HOSPADM

## 2019-02-19 RX ORDER — CLINDAMYCIN PHOSPHATE 900 MG/50ML
900 INJECTION, SOLUTION INTRAVENOUS ONCE
Status: COMPLETED | OUTPATIENT
Start: 2019-02-19 | End: 2019-02-19

## 2019-02-19 RX ORDER — MIDAZOLAM HYDROCHLORIDE 1 MG/ML
INJECTION INTRAMUSCULAR; INTRAVENOUS CODE/TRAUMA/SEDATION MEDICATION
Status: COMPLETED | OUTPATIENT
Start: 2019-02-19 | End: 2019-02-19

## 2019-02-19 RX ORDER — FENTANYL CITRATE 50 UG/ML
INJECTION, SOLUTION INTRAMUSCULAR; INTRAVENOUS CODE/TRAUMA/SEDATION MEDICATION
Status: COMPLETED | OUTPATIENT
Start: 2019-02-19 | End: 2019-02-19

## 2019-02-19 RX ADMIN — FENTANYL CITRATE 50 MCG: 50 INJECTION, SOLUTION INTRAMUSCULAR; INTRAVENOUS at 05:02

## 2019-02-19 RX ADMIN — MIDAZOLAM HYDROCHLORIDE 1 MG: 1 INJECTION, SOLUTION INTRAMUSCULAR; INTRAVENOUS at 05:02

## 2019-02-19 RX ADMIN — CLINDAMYCIN IN 5 PERCENT DEXTROSE 900 MG: 18 INJECTION, SOLUTION INTRAVENOUS at 05:02

## 2019-02-19 RX ADMIN — CLINDAMYCIN PALMITATE HYDROCHLORIDE 75 MG: 75 POWDER, FOR SOLUTION ORAL at 05:02

## 2019-02-19 RX ADMIN — OXYCODONE HYDROCHLORIDE 5 MG: 5 TABLET ORAL at 06:02

## 2019-02-19 NOTE — H&P
Radiology History & Physical      SUBJECTIVE:     Chief Complaint: preprocedure    History of Present Illness:  Georgia Jeffrey is a 63 y.o. female with a history of infiltrating ductal carcinoma of the left breast s/p bilateral mastectomies with subsequent development of osseous metastatic disease who presents for chest port placement for chemotherapy.     Past Medical History:   Diagnosis Date    Cancer     breast cancer; lung cancer    PONV (postoperative nausea and vomiting)      Past Surgical History:   Procedure Laterality Date    BREAST SURGERY  2004    breast reduction    ENDOBRONCHIAL ULTRASOUND (EBUS) N/A 7/12/2017    [facility]per [facility]    laparascopy      MASTECTOMY Bilateral 05/11/2017    TEMPOROMANDIBULAR JOINT SURGERY      TONSILLECTOMY         Home Meds:   Prior to Admission medications    Medication Sig Start Date End Date Taking? Authorizing Provider   dextroamphetamine-amphetamine (ADDERALL) 5 mg Tab Take 10 mg by mouth once daily. 1/17/19 1/17/20 Yes Eden Pineda MD   capecitabine (XELODA) 500 MG Tab Take 4 tablets (2,000 mg total) by mouth in the morning and 3 tablets (1,500 mg total) in the evening for 14 days then stop for 7 days. 11/13/18   Gabriel Whatley MD   citalopram (CELEXA) 10 MG tablet Take 1 tablet (10 mg total) by mouth once daily. 12/10/18 12/10/19  Gabriel Whatley MD   citalopram (CELEXA) 20 MG tablet Take 1 tablet (20 mg total) by mouth once daily. 12/10/18 12/10/19  Gabriel Whatley MD   cyclobenzaprine (FLEXERIL) 10 MG tablet Take 1 tablet (10 mg total) by mouth 3 (three) times daily as needed for Muscle spasms. 2/20/18   Gabriel Whatley MD   doxycycline (VIBRAMYCIN) 100 MG Cap Take 1 capsule (100 mg total) by mouth 2 (two) times daily. 11/9/18   Gabriel Whatley MD   dronabinol (MARINOL) 5 MG capsule Take 1 capsule (5 mg total) by mouth 2 (two) times daily before meals. 1/15/19   Gabriel Whatley MD   methylphenidate HCl (RITALIN) 5 MG tablet Take 1  tablet (5 mg total) by mouth 3 (three) times daily with meals. Take at breakfast and at noon 1/15/19 1/15/20  Gabriel Whatley MD   morphine 10 mg/5 mL solution Take 3 mLs (6 mg total) by mouth every 3 (three) hours as needed for Pain. 5/9/18   Felipe Lacey MD   mupirocin (BACTROBAN) 2 % ointment Apply to affected area 3 times daily 9/29/18   Trish Johnson MD   nitrofurantoin (MACRODANTIN) 100 MG capsule Take 1 capsule (100 mg total) by mouth 2 (two) times daily. 7/25/18   Gabriel Whatley MD   ondansetron (ZOFRAN) 8 MG tablet Take 1 tablet (8 mg total) by mouth every 8 (eight) hours as needed for Nausea. 1/15/19   Gabriel Whatley MD   tobramycin-dexamethasone 0.3-0.1% (TOBRADEX) 0.3-0.1 % DrpS Place 1 drop into both eyes every 6 (six) hours. 6/21/18   Gabriel Whatley MD   traMADol (ULTRAM) 50 mg tablet TK 2 TS PO Q 4 H PRN P 12/19/17   Historical Provider, MD     Anticoagulants/Antiplatelets: no anticoagulation    Allergies:   Review of patient's allergies indicates:   Allergen Reactions    Penicillins Other (See Comments)     unknown    Vancomycin analogues Hives     Sedation History:  no adverse reactions    Review of Systems:   Hematological: no known coagulopathies  Respiratory: no shortness of breath  Cardiovascular: no chest pain  Gastrointestinal: no abdominal pain  Genito-Urinary: no dysuria  Musculoskeletal: negative  Neurological: no TIA or stroke symptoms         OBJECTIVE:     Vital Signs (Most Recent)  Temp: 97.7 °F (36.5 °C) (02/19/19 1245)  Pulse: 77 (02/19/19 1245)  Resp: 16 (02/19/19 1245)  BP: 120/75 (02/19/19 1245)  SpO2: 96 % (02/19/19 1245)    Physical Exam:  ASA: 2  Mallampati: 2  General: no acute distress  Mental Status: alert and oriented to person, place and time  HEENT: normocephalic, atraumatic  Chest: unlabored breathing  Heart: regular heart rate  Abdomen: nondistended  Extremity: moves all extremities    Laboratory  Lab Results   Component Value Date    INR 1.0 02/19/2019       Lab  Results   Component Value Date    WBC 5.11 02/11/2019    HGB 12.7 02/11/2019    HCT 36.1 (L) 02/11/2019     (H) 02/11/2019     02/11/2019      Lab Results   Component Value Date    GLU 99 02/11/2019     02/11/2019    K 4.5 02/11/2019     02/11/2019    CO2 29 02/11/2019    BUN 14 02/11/2019    CREATININE 0.55 02/11/2019    CALCIUM 9.4 02/11/2019    MG 2.1 05/06/2018    ALT 23 02/11/2019    AST 22 02/11/2019    ALBUMIN 4.2 02/11/2019    BILITOT 0.6 02/11/2019       ASSESSMENT/PLAN:     Sedation Plan: Moderate sedation   Patient will undergo chest port placement for chemotherapy.    Ry Luther MD  Interventional Radiology PGY-II  [facility]  Pager: [phone]

## 2019-02-19 NOTE — PROCEDURES
Radiology Post Procedure Note:     Procedure: Right IJ Chest Port Placement    (s): Tara    Blood Loss: Minimal    Specimen: none    Findings:   Patient came to IR and under imaging guidance had an 8 F single lumen right IJ chest power port placed with the tip terminating at the cavoatrial junction. Line ready to use.     Solo CHOU M.D. personally reviewed and agree with the above dictated note.

## 2019-02-19 NOTE — DISCHARGE SUMMARY
Radiology Discharge Summary      Hospital Course: No complications    Admit Date: 2/19/2019  Discharge Date: 02/19/2019     Instructions Given to Patient: Yes  Diet: Resume prior diet  Activity: activity as tolerated    Description of Condition on Discharge: Stable  Vital Signs (Most Recent): Temp: 97.7 °F (36.5 °C) (02/19/19 1245)  Pulse: 71 (02/19/19 1730)  Resp: 18 (02/19/19 1730)  BP: (!) 155/87 (02/19/19 1730)  SpO2: 97 % (02/19/19 1730)    Discharge Disposition: Home    Discharge Diagnosis: Breast Ca     Follow-up: with referring    .ISolo M.D. personally reviewed and agree with the above dictated note.

## 2019-02-19 NOTE — SEDATION DOCUMENTATION
Pt tolerated procedure well. Dressing to right upper chest CDI. BARD power port placed to right IJ measuring at 20cm. Pt to transfer to [facility] for further recovery.

## 2019-02-20 ENCOUNTER — NURSE TRIAGE (OUTPATIENT)
Dept: ADMINISTRATIVE | Facility: CLINIC | Age: 64
End: 2019-02-20

## 2019-02-20 NOTE — PROGRESS NOTES
Pt and family member acknowledged understanding of discharge instructions. Pt discharged per unit and hospital protocol via wheelchair with family member.

## 2019-02-21 NOTE — TELEPHONE ENCOUNTER
Reason for Disposition   Patient sounds very sick or weak to the triager    Protocols used: ST POST-OP SYMPTOMS AND [id number]    Post op Pain rated as 6/10 cant sleep due to discomfort near port a cath (neck area)  Denies fever, or redness that she can see  Does report some swelling to site  States she feels very weak. Having to use wall for support to walk  Advised ED now

## 2019-02-24 ENCOUNTER — PATIENT MESSAGE (OUTPATIENT)
Dept: HEMATOLOGY/ONCOLOGY | Facility: CLINIC | Age: 64
End: 2019-02-24

## 2019-02-25 ENCOUNTER — TELEPHONE (OUTPATIENT)
Dept: HEMATOLOGY/ONCOLOGY | Facility: CLINIC | Age: 64
End: 2019-02-25

## 2019-02-25 DIAGNOSIS — M54.2 NECK PAIN: ICD-10-CM

## 2019-02-25 RX ORDER — GABAPENTIN 300 MG/1
300 CAPSULE ORAL 3 TIMES DAILY
Qty: 90 CAPSULE | Refills: 2 | Status: SHIPPED | OUTPATIENT
Start: 2019-02-25 | End: 2020-02-25

## 2019-02-25 NOTE — TELEPHONE ENCOUNTER
Pt is complaining of pain since her port placement. She said she feels like she has strep and it is hard to swallow since port was placed. She said her right neck is sore to behind her ears and she just doesn't feel right. I offered her an urgent care spot to see Chayito today or tomorrow, but she wants to speak with you. Informed pt would forward this information to you.       ----- Message from Raymond Noble sent at 2/25/2019  1:26 PM CST -----  Contact: Patient  Pt is in extreme pain after having her port placement on 02/19. Went to the ED last night needs a call from the doctor to f/u.     Contact:: [phone]

## 2019-02-27 ENCOUNTER — TELEPHONE (OUTPATIENT)
Dept: HEMATOLOGY/ONCOLOGY | Facility: CLINIC | Age: 64
End: 2019-02-27

## 2019-02-27 ENCOUNTER — PATIENT MESSAGE (OUTPATIENT)
Dept: HEMATOLOGY/ONCOLOGY | Facility: CLINIC | Age: 64
End: 2019-02-27

## 2019-02-27 ENCOUNTER — TELEPHONE (OUTPATIENT)
Dept: INTERVENTIONAL RADIOLOGY/VASCULAR | Facility: HOSPITAL | Age: 64
End: 2019-02-27

## 2019-02-27 VITALS — HEIGHT: 65 IN | WEIGHT: 150 LBS | BODY MASS INDEX: 24.99 KG/M2

## 2019-02-27 DIAGNOSIS — Z51.11 ENCOUNTER FOR ANTINEOPLASTIC CHEMOTHERAPY: Primary | ICD-10-CM

## 2019-02-27 DIAGNOSIS — C79.51 CARCINOMA OF BREAST METASTATIC TO BONE, UNSPECIFIED LATERALITY: ICD-10-CM

## 2019-02-27 DIAGNOSIS — C50.919 CARCINOMA OF BREAST METASTATIC TO BONE, UNSPECIFIED LATERALITY: ICD-10-CM

## 2019-02-27 NOTE — TELEPHONE ENCOUNTER
"Returned Husbands call to inform him of our attempts to get patient coordinated with port removal today.  shared with me his experience with regards to port insertion in radiology and what a "terrible experience " it was for the patient. He is stating that he is feeling in a "no compromise " state and feeling protective of his wife. I'm really at odds with the same team performing anything on my wife given the experience that we had in the department and the pain that she has been having "      I apologized to  for this experience and informed him that I would speak with DR Whatley to move forward on plan for port removal and reinsertion .   I called radiology and spoke with NP and  about being seen today and awaiting time .  Dr Whatley is willing to support patients decision regarding another provider / surgeon putting in if their timeline supports expediting for patient or if patient can work with another provider in radiology to insert a new port.       Spoke with  and going to add patient on in am   To remove port as schedule is booked into late evening  And unpredictable as to when patient could be fit in .  Will off aptient early am appt arrival at 8am . Radiology ( DR Reyes is offering to take out and put back in ) ; I explained the concerns of family but that I would present this as an option to patient and .  I informed radiology that If provider could meet patient and discuss procedure and plan it would be up to patient if she would agree to removal only or removal and reinsertion .  Dr wahtley was supportive of patients choice.   If patient only ops for removal ; then we can move forward with rescheduling port placement with surgeon     Contacted  to inform of plan,  was agreeable to am as patient ate lunch and given how late it would be in the day.  I informed him that Dr Reyes will be the provider performing the procedure and that he is offering to " reinsert port , but that we will request that he speak with he and his wife regarding the current port and the approach for reinsertion.   is agreeable to this and would prefer that wife have it reinserted so that she could move forward with treatment .  I will  informed Dr funes

## 2019-02-27 NOTE — TELEPHONE ENCOUNTER
----- Message from Vivienne Singletary sent at 2/27/2019  9:15 AM CST -----  Contact:    is calling to speak with Staff regarding the pt being very sick and gravely in pain.  He is very upset because he believes she is not being treated fairly.    says if no one contacts him by noon, it will be trouble.    He is requesting a returned call at [phone].    Thank you.

## 2019-02-28 ENCOUNTER — HOSPITAL ENCOUNTER (OUTPATIENT)
Facility: HOSPITAL | Age: 64
Discharge: HOME | End: 2019-02-28
Attending: INTERNAL MEDICINE | Admitting: INTERNAL MEDICINE
Payer: COMMERCIAL

## 2019-02-28 VITALS
DIASTOLIC BLOOD PRESSURE: 82 MMHG | RESPIRATION RATE: 15 BRPM | TEMPERATURE: 98 F | BODY MASS INDEX: 25.61 KG/M2 | SYSTOLIC BLOOD PRESSURE: 122 MMHG | WEIGHT: 150 LBS | HEIGHT: 64 IN | HEART RATE: 80 BPM | OXYGEN SATURATION: 97 %

## 2019-02-28 DIAGNOSIS — C50.919 CARCINOMA OF BREAST METASTATIC TO BONE, UNSPECIFIED LATERALITY: ICD-10-CM

## 2019-02-28 DIAGNOSIS — Z51.11 ENCOUNTER FOR ANTINEOPLASTIC CHEMOTHERAPY: ICD-10-CM

## 2019-02-28 DIAGNOSIS — C79.51 CARCINOMA OF BREAST METASTATIC TO BONE, UNSPECIFIED LATERALITY: ICD-10-CM

## 2019-02-28 PROCEDURE — 25000003 PHARM REV CODE 250: Performed by: STUDENT IN AN ORGANIZED HEALTH CARE EDUCATION/TRAINING PROGRAM

## 2019-02-28 PROCEDURE — S0077 INJECTION, CLINDAMYCIN PHOSP: HCPCS | Performed by: STUDENT IN AN ORGANIZED HEALTH CARE EDUCATION/TRAINING PROGRAM

## 2019-02-28 PROCEDURE — 63600175 PHARM REV CODE 636 W HCPCS: Performed by: RADIOLOGY

## 2019-02-28 RX ORDER — FENTANYL CITRATE 50 UG/ML
INJECTION, SOLUTION INTRAMUSCULAR; INTRAVENOUS CODE/TRAUMA/SEDATION MEDICATION
Status: COMPLETED | OUTPATIENT
Start: 2019-02-28 | End: 2019-02-28

## 2019-02-28 RX ORDER — HEPARIN 100 UNIT/ML
SYRINGE INTRAVENOUS CODE/TRAUMA/SEDATION MEDICATION
Status: COMPLETED | OUTPATIENT
Start: 2019-02-28 | End: 2019-02-28

## 2019-02-28 RX ORDER — CLINDAMYCIN PHOSPHATE 900 MG/50ML
900 INJECTION, SOLUTION INTRAVENOUS
Status: COMPLETED | OUTPATIENT
Start: 2019-02-28 | End: 2019-02-28

## 2019-02-28 RX ORDER — CIPROFLOXACIN 2 MG/ML
400 INJECTION, SOLUTION INTRAVENOUS ONCE
Status: DISCONTINUED | OUTPATIENT
Start: 2019-02-28 | End: 2019-02-28

## 2019-02-28 RX ORDER — MIDAZOLAM HYDROCHLORIDE 1 MG/ML
INJECTION INTRAMUSCULAR; INTRAVENOUS CODE/TRAUMA/SEDATION MEDICATION
Status: COMPLETED | OUTPATIENT
Start: 2019-02-28 | End: 2019-02-28

## 2019-02-28 RX ORDER — SODIUM CHLORIDE 9 MG/ML
INJECTION, SOLUTION INTRAVENOUS CONTINUOUS
Status: DISCONTINUED | OUTPATIENT
Start: 2019-02-28 | End: 2019-02-28 | Stop reason: HOSPADM

## 2019-02-28 RX ORDER — LIDOCAINE HYDROCHLORIDE 10 MG/ML
1 INJECTION, SOLUTION EPIDURAL; INFILTRATION; INTRACAUDAL; PERINEURAL ONCE
Status: DISCONTINUED | OUTPATIENT
Start: 2019-02-28 | End: 2019-02-28 | Stop reason: HOSPADM

## 2019-02-28 RX ADMIN — MIDAZOLAM HYDROCHLORIDE 1 MG: 1 INJECTION, SOLUTION INTRAMUSCULAR; INTRAVENOUS at 09:02

## 2019-02-28 RX ADMIN — HEPARIN SODIUM (PORCINE) LOCK FLUSH IV SOLN 100 UNIT/ML 5 ML: 100 SOLUTION at 09:02

## 2019-02-28 RX ADMIN — FENTANYL CITRATE 50 MCG: 50 INJECTION, SOLUTION INTRAMUSCULAR; INTRAVENOUS at 09:02

## 2019-02-28 RX ADMIN — CLINDAMYCIN PHOSPHATE 900 MG: 18 INJECTION, SOLUTION INTRAVENOUS at 08:02

## 2019-02-28 NOTE — PROCEDURES
Radiology Post Procedure Note:     Procedure: Right IJ Chest Port Placement    (s): Tara    Blood Loss: Minimal    Specimen: none    Findings:     Patient came to IR and had the existing right port removed.     Next, under imaging guidance had an 8 F single lumen right IJ chest power port placed with the tip terminating at the cavoatrial junction. Line ready to use.     Solo CHOU M.D. personally reviewed and agree with the above dictated note.

## 2019-02-28 NOTE — H&P
Radiology History & Physical      SUBJECTIVE:     Chief Complaint: Port pain    History of Present Illness:  Georgia Jeffrey is a 63 y.o. female who presents for port removal and replacement  Past Medical History:   Diagnosis Date    Cancer     breast cancer; lung cancer    PONV (postoperative nausea and vomiting)      Past Surgical History:   Procedure Laterality Date    BREAST SURGERY  2004    breast reduction    ENDOBRONCHIAL ULTRASOUND (EBUS) N/A 7/12/2017    Performed by [facility] at [facility]    [id number] N/A 2/19/2019    Performed by [facility] Provider at [facility]    laparascopy      MASTECTOMY Bilateral 05/11/2017    TEMPOROMANDIBULAR JOINT SURGERY      TONSILLECTOMY         Home Meds:   Prior to Admission medications    Medication Sig Start Date End Date Taking? Authorizing Provider   capecitabine (XELODA) 500 MG Tab Take 4 tablets (2,000 mg total) by mouth in the morning and 3 tablets (1,500 mg total) in the evening for 14 days then stop for 7 days. 11/13/18  Yes Gabriel Whatley MD   citalopram (CELEXA) 20 MG tablet Take 1 tablet (20 mg total) by mouth once daily. 12/10/18 12/10/19 Yes Gabriel Whatley MD   dronabinol (MARINOL) 5 MG capsule Take 1 capsule (5 mg total) by mouth 2 (two) times daily before meals. 1/15/19  Yes Gabriel Whately MD   gabapentin (NEURONTIN) 300 MG capsule Take 1 capsule (300 mg total) by mouth 3 (three) times daily. 2/25/19 2/25/20 Yes Gabriel Whatley MD   citalopram (CELEXA) 10 MG tablet Take 1 tablet (10 mg total) by mouth once daily. 12/10/18 12/10/19  Gabriel Whatley MD   cyclobenzaprine (FLEXERIL) 10 MG tablet Take 1 tablet (10 mg total) by mouth 3 (three) times daily as needed for Muscle spasms. 2/20/18   Gabriel Whatley MD   dextroamphetamine-amphetamine (ADDERALL) 5 mg Tab Take 10 mg by mouth once daily. 1/17/19 1/17/20  Eden Pineda MD   doxycycline (VIBRAMYCIN) 100 MG Cap Take 1 capsule (100 mg total) by mouth 2 (two) times daily.  11/9/18   Gabriel Whatley MD   HYDROcodone-acetaminophen (NORCO) 7.5-325 mg per tablet Take 1 tablet by mouth every 6 (six) hours as needed for Pain. 2/24/19   Tucker Carmen NP   methylphenidate HCl (RITALIN) 5 MG tablet Take 1 tablet (5 mg total) by mouth 3 (three) times daily with meals. Take at breakfast and at noon 1/15/19 1/15/20  Gabriel Whatley MD   morphine 10 mg/5 mL solution Take 3 mLs (6 mg total) by mouth every 3 (three) hours as needed for Pain. 5/9/18   Felipe Lacey MD   mupirocin (BACTROBAN) 2 % ointment Apply to affected area 3 times daily 9/29/18   Trish Johnson MD   nitrofurantoin (MACRODANTIN) 100 MG capsule Take 1 capsule (100 mg total) by mouth 2 (two) times daily. 7/25/18   Gabriel Whatley MD   ondansetron (ZOFRAN) 8 MG tablet Take 1 tablet (8 mg total) by mouth every 8 (eight) hours as needed for Nausea. 1/15/19   Gabriel Whatley MD   tobramycin-dexamethasone 0.3-0.1% (TOBRADEX) 0.3-0.1 % DrpS Place 1 drop into both eyes every 6 (six) hours. 6/21/18   Gabriel Whatlye MD   traMADol (ULTRAM) 50 mg tablet TK 2 TS PO Q 4 H PRN P 12/19/17   Historical Provider, MD     Anticoagulants/Antiplatelets: no anticoagulation    Allergies:   Review of patient's allergies indicates:   Allergen Reactions    Penicillins Other (See Comments)     unknown    Vancomycin analogues Hives     Sedation History:  no adverse reactions           OBJECTIVE:     Vital Signs (Most Recent)  Temp: 98.1 °F (36.7 °C) (02/28/19 0628)  Pulse: 86 (02/28/19 0628)  Resp: 18 (02/28/19 0628)  BP: 120/76 (02/28/19 0628)  SpO2: 96 % (02/28/19 0628)    Physical Exam:  ASA: 2  Mallampati: 2    General: no acute distress  Mental Status: alert and oriented to person, place and time  HEENT: normocephalic, atraumatic  Chest: unlabored breathing  Heart: regular heart rate  Abdomen: nondistended  Extremity: moves all extremities    Laboratory  Lab Results   Component Value Date    INR 1.0 02/24/2019       Lab Results   Component Value Date     WBC 8.99 02/24/2019    HGB 12.8 02/24/2019    HCT 37.4 02/24/2019     (H) 02/24/2019     02/24/2019      Lab Results   Component Value Date     (H) 02/24/2019     02/24/2019    K 3.9 02/24/2019     02/24/2019    CO2 31 02/24/2019    BUN 10 02/24/2019    CREATININE 0.56 02/24/2019    CALCIUM 9.4 02/24/2019    MG 2.1 05/06/2018    ALT 92 (H) 02/24/2019    AST 58 (H) 02/24/2019    ALBUMIN 4.1 02/24/2019    BILITOT 1.0 02/24/2019       ASSESSMENT/PLAN:     Sedation Plan: Moderate  Patient will undergo Port removal and replacement.    .ISolo M.D. personally reviewed and agree with the above dictated note.

## 2019-02-28 NOTE — PROGRESS NOTES
Pt arrived to [facility] bay 3, no acute distress noted. Report received from BOB Yi. Dressing CDI. Will continue to monitor.

## 2019-02-28 NOTE — PROGRESS NOTES
Isaiah in Interventional radiology notified that patient is ready for procedure from [facility] standpoint.

## 2019-02-28 NOTE — DISCHARGE INSTRUCTIONS
[facility] [phone] (MON-FRI 8 AM- 5PM). Radiology Resident on call [phone].

## 2019-02-28 NOTE — PROGRESS NOTES
Para completed, pt tolerated well. No apparent distress noted. Dressing CDI. Discharge instructions reviewed and acknowledged. Pt discharged via wheelchair and private vehicle.

## 2019-03-06 ENCOUNTER — TELEPHONE (OUTPATIENT)
Dept: HEMATOLOGY/ONCOLOGY | Facility: CLINIC | Age: 64
End: 2019-03-06

## 2019-03-06 NOTE — TELEPHONE ENCOUNTER
Returned call to pt to see if I could assist with her questions, but patient would like to speak with Dr Whatley instead. Informed pt that he is currently in clinic, but would let him know. Pt verbalized understanding.     ----- Message from [employer] sent at 3/6/2019  8:55 AM CST -----  Contact: Pt   Pt called to speak w/ Dr Whatley have some questions   Callback#[phone]  Thank You  CARINA Live

## 2019-03-15 ENCOUNTER — DOCUMENTATION ONLY (OUTPATIENT)
Dept: INFUSION THERAPY | Facility: HOSPITAL | Age: 64
End: 2019-03-15

## 2019-03-15 NOTE — PROGRESS NOTES
Called patient to remind her of infusion appointment patient states she is Dr foster patient and she is not taking this anymore on different treatment

## 2019-04-03 ENCOUNTER — LAB VISIT (OUTPATIENT)
Dept: LAB | Facility: HOSPITAL | Age: 64
End: 2019-04-03
Attending: INTERNAL MEDICINE
Payer: COMMERCIAL

## 2019-04-03 DIAGNOSIS — C50.312 MALIGNANT NEOPLASM OF LOWER-INNER QUADRANT OF LEFT FEMALE BREAST: Primary | ICD-10-CM

## 2019-04-03 LAB
ALBUMIN SERPL BCP-MCNC: 4.4 G/DL (ref 3.5–5.2)
ALP SERPL-CCNC: 64 U/L (ref 38–145)
ALT SERPL W/O P-5'-P-CCNC: 17 U/L (ref 10–44)
ANION GAP SERPL CALC-SCNC: 7 MMOL/L (ref 8–16)
AST SERPL-CCNC: 28 U/L (ref 14–36)
BASOPHILS # BLD AUTO: 0.02 K/UL (ref 0–0.2)
BASOPHILS NFR BLD: 0.5 % (ref 0–1.9)
BILIRUB SERPL-MCNC: 0.6 MG/DL (ref 0.2–1.3)
BUN SERPL-MCNC: 15 MG/DL (ref 7–18)
CALCIUM SERPL-MCNC: 9.8 MG/DL (ref 8.4–10.2)
CHLORIDE SERPL-SCNC: 100 MMOL/L (ref 95–110)
CO2 SERPL-SCNC: 30 MMOL/L (ref 22–31)
CREAT SERPL-MCNC: 0.59 MG/DL (ref 0.5–1.4)
DIFFERENTIAL METHOD: ABNORMAL
EOSINOPHIL # BLD AUTO: 0.3 K/UL (ref 0–0.5)
EOSINOPHIL NFR BLD: 6.7 % (ref 0–8)
ERYTHROCYTE [DISTWIDTH] IN BLOOD BY AUTOMATED COUNT: 14.7 % (ref 11.5–14.5)
EST. GFR  (AFRICAN AMERICAN): >60 ML/MIN/1.73 M^2
EST. GFR  (NON AFRICAN AMERICAN): >60 ML/MIN/1.73 M^2
GLUCOSE SERPL-MCNC: 87 MG/DL (ref 70–110)
HCT VFR BLD AUTO: 37.9 % (ref 37–48.5)
HGB BLD-MCNC: 12.9 G/DL (ref 12–16)
IMM GRANULOCYTES # BLD AUTO: 0.02 K/UL (ref 0–0.04)
IMM GRANULOCYTES NFR BLD AUTO: 0.5 % (ref 0–0.5)
LYMPHOCYTES # BLD AUTO: 0.7 K/UL (ref 1–4.8)
LYMPHOCYTES NFR BLD: 17.1 % (ref 18–48)
MCH RBC QN AUTO: 37.1 PG (ref 27–31)
MCHC RBC AUTO-ENTMCNC: 34 G/DL (ref 32–36)
MCV RBC AUTO: 109 FL (ref 82–98)
MONOCYTES # BLD AUTO: 0.4 K/UL (ref 0.3–1)
MONOCYTES NFR BLD: 11.1 % (ref 4–15)
NEUTROPHILS # BLD AUTO: 2.5 K/UL (ref 1.8–7.7)
NEUTROPHILS NFR BLD: 64.1 % (ref 38–73)
NRBC BLD-RTO: 0 /100 WBC
PLATELET # BLD AUTO: 184 K/UL (ref 150–350)
PMV BLD AUTO: 9.8 FL (ref 9.2–12.9)
POTASSIUM SERPL-SCNC: 4.6 MMOL/L (ref 3.5–5.1)
PROT SERPL-MCNC: 8 G/DL (ref 6–8.4)
RBC # BLD AUTO: 3.48 M/UL (ref 4–5.4)
SODIUM SERPL-SCNC: 137 MMOL/L (ref 136–145)
WBC # BLD AUTO: 3.87 K/UL (ref 3.9–12.7)

## 2019-04-03 PROCEDURE — 86300 IMMUNOASSAY TUMOR CA 15-3: CPT

## 2019-04-03 PROCEDURE — 80053 COMPREHEN METABOLIC PANEL: CPT

## 2019-04-03 PROCEDURE — 80053 COMPREHEN METABOLIC PANEL: CPT | Mod: PN

## 2019-04-03 PROCEDURE — 85025 COMPLETE CBC W/AUTO DIFF WBC: CPT

## 2019-04-03 PROCEDURE — 85025 COMPLETE CBC W/AUTO DIFF WBC: CPT | Mod: PN

## 2019-04-05 LAB — CANCER AG27-29 SERPL-ACNC: 64.8 U/ML

## 2019-05-06 ENCOUNTER — LAB VISIT (OUTPATIENT)
Dept: LAB | Facility: HOSPITAL | Age: 64
End: 2019-05-06
Attending: INTERNAL MEDICINE
Payer: COMMERCIAL

## 2019-05-06 DIAGNOSIS — Z51.11 ENCOUNTER FOR ANTINEOPLASTIC CHEMOTHERAPY: ICD-10-CM

## 2019-05-06 DIAGNOSIS — C50.312 MALIGNANT NEOPLASM OF LOWER-INNER QUADRANT OF LEFT FEMALE BREAST: Primary | ICD-10-CM

## 2019-05-06 LAB
ANISOCYTOSIS BLD QL SMEAR: SLIGHT
BASOPHILS NFR BLD: 1 % (ref 0–1.9)
DIFFERENTIAL METHOD: ABNORMAL
EOSINOPHIL NFR BLD: 2 % (ref 0–8)
ERYTHROCYTE [DISTWIDTH] IN BLOOD BY AUTOMATED COUNT: 14.6 % (ref 11.5–14.5)
GIANT PLATELETS BLD QL SMEAR: PRESENT
HCT VFR BLD AUTO: 31.9 % (ref 37–48.5)
HGB BLD-MCNC: 11.2 G/DL (ref 12–16)
IMM GRANULOCYTES # BLD AUTO: ABNORMAL K/UL (ref 0–0.04)
IMM GRANULOCYTES NFR BLD AUTO: ABNORMAL % (ref 0–0.5)
LYMPHOCYTES NFR BLD: 36 % (ref 18–48)
MCH RBC QN AUTO: 36.7 PG (ref 27–31)
MCHC RBC AUTO-ENTMCNC: 35.1 G/DL (ref 32–36)
MCV RBC AUTO: 105 FL (ref 82–98)
MONOCYTES NFR BLD: 7 % (ref 4–15)
NEUTROPHILS # BLD AUTO: 0.9 K/UL (ref 1.8–7.7)
NEUTROPHILS NFR BLD: 53 % (ref 38–73)
NEUTS BAND NFR BLD MANUAL: 1 %
NRBC BLD-RTO: 0 /100 WBC
PLATELET # BLD AUTO: 212 K/UL (ref 150–350)
PMV BLD AUTO: 10.8 FL (ref 9.2–12.9)
RBC # BLD AUTO: 3.05 M/UL (ref 4–5.4)
WBC # BLD AUTO: 1.61 K/UL (ref 3.9–12.7)

## 2019-05-06 PROCEDURE — 36415 COLL VENOUS BLD VENIPUNCTURE: CPT | Mod: PN

## 2019-05-06 PROCEDURE — 85027 COMPLETE CBC AUTOMATED: CPT

## 2019-05-06 PROCEDURE — 85027 COMPLETE CBC AUTOMATED: CPT | Mod: PN

## 2019-05-06 PROCEDURE — 85007 BL SMEAR W/DIFF WBC COUNT: CPT

## 2019-05-06 PROCEDURE — 85007 BL SMEAR W/DIFF WBC COUNT: CPT | Mod: PN

## 2019-06-17 ENCOUNTER — AMBULATORY - HEALTHEAST (OUTPATIENT)
Dept: LAB | Facility: CLINIC | Age: 64
End: 2019-06-17

## 2019-06-17 DIAGNOSIS — Z51.11 ENCOUNTER FOR ANTINEOPLASTIC CHEMOTHERAPY: ICD-10-CM

## 2019-06-17 DIAGNOSIS — C50.312 MALIGNANT NEOPLASM OF LOWER-INNER QUADRANT OF LEFT FEMALE BREAST (H): ICD-10-CM

## 2019-06-17 LAB
BASOPHILS # BLD AUTO: 0 THOU/UL (ref 0–0.2)
BASOPHILS NFR BLD AUTO: 1 % (ref 0–2)
EOSINOPHIL # BLD AUTO: 0.1 THOU/UL (ref 0–0.4)
EOSINOPHIL NFR BLD AUTO: 4 % (ref 0–6)
ERYTHROCYTE [DISTWIDTH] IN BLOOD BY AUTOMATED COUNT: 16 % (ref 11–14.5)
HCT VFR BLD AUTO: 31.2 % (ref 35–47)
HGB BLD-MCNC: 10.5 G/DL (ref 12–16)
LYMPHOCYTES # BLD AUTO: 0.9 THOU/UL (ref 0.8–4.4)
LYMPHOCYTES NFR BLD AUTO: 39 % (ref 20–40)
MCH RBC QN AUTO: 36.6 PG (ref 27–34)
MCHC RBC AUTO-ENTMCNC: 33.7 G/DL (ref 32–36)
MCV RBC AUTO: 109 FL (ref 80–100)
MONOCYTES # BLD AUTO: 0.2 THOU/UL (ref 0–0.9)
MONOCYTES NFR BLD AUTO: 9 % (ref 2–10)
NEUTROPHILS # BLD AUTO: 1 THOU/UL (ref 2–7.7)
NEUTROPHILS NFR BLD AUTO: 46 % (ref 50–70)
PLATELET # BLD AUTO: 195 THOU/UL (ref 140–440)
PMV BLD AUTO: 10.5 FL (ref 8.5–12.5)
RBC # BLD AUTO: 2.87 MILL/UL (ref 3.8–5.4)
WBC: 2.2 THOU/UL (ref 4–11)

## 2019-12-24 NOTE — PROGRESS NOTES
Subjective:       Patient ID: Georgia Jeffrey is a 62 y.o. female.    Chief Complaint: No chief complaint on file.    HPI Ms. Jeffrey is a 62-year-old female seen for metastatic carcinoma of the left breast.      She returns for follow-up with PET scan results.  She's been having significant pain and paraspinous muscle spasm in the upper thoracic area.  I instructed her to increase her Flexeril to 3 times a day to see if that would help.  She reports that she's not having any pain unless she twists.  She is able to reach up or down without any problems.      Breast History: She developed palpable abnormality in her left breas in the early part of 2017.    Diagnostic mammography from April 18, 2017 showed an irregular mass with pleomorphic calcifications at the 9:00 area of the left breast.  By ultrasound this was solid mass measuring 36 mm.    Breast biopsy on April 19, 2017 showed infiltrating ductal carcinoma (histologic grade 3, nuclear grade 2, mitotic index 3) tumor was 99% ER positive, 91% IL positive.  HER-2 was negative by FISH.    On May 11, 2017 she underwent bilateral nipple sparing mastectomies by Dr.Carl Gutierrez and autologous breast reconstruction by  at [facility].  She developed some compromise of her left flap which was removed and an implant was placed.  That became infected and was removed.  Several weeks ago she underwent removal of residual reconstruction from both sides.    The pathology from that surgery showed a 3 cm high grade grading ductal carcinoma (3+3+2).  There is associated lymphovascular invasion.  In addition was associated high-grade DCIS.  [place] lymph node was negative.    The right breast showed no evidence of any abnormality.  Final pathological stage TII N0 stage IIA.  Oncotype was 31.    She was seen by medical oncology and chemotherapy was discussed.     PET/CT scan was performed on June 27 which showed increased uptake in the subcarinal node  measuring 9 mm an SUV of 5.9, there was increased uptake in the right hilum with an SUV of 5.9, there were multiple pulmonary nodules on the right side a right middle lobe nodule measured 16 mm with an SUV of 6.8 right lower lobe nodule 9 mm with an SUV of 2.6.  In addition there were multiple other subcentimeter nodules.  Increased uptake was seen in the right intertrochanteric femur with an SUV of 4.  (Subsequent MRI of the right femur on July 14 did not show any bony abnormality).    On July 14 she underwent bronchoscopy and endoscopic ultrasound.  Left lower lobe brush sample was positive for carcinoma and a fine-needle aspirate from a station 7 lymph node was also positive for carcinoma.  These were estrogen receptor positive.    In July 2017 she was started on systemic therapy with Faslodex and palbociclib.  In August 2017 she developed a DVT of the left lower extremity with the peroneal and posterior tibial veins exhibiting thrombosis.  She was started on apixaban at that time.    Due to insurance  issues she was changed to letrozole in October 2017 and continued her palbociclib. She had significant issues  with nausea and vomiting secondary to letrozole.    Follow-up PET scan in September  suggested bone metastasis.  On December 1, 2017 she underwent MRI scanning of the thoracic and lumbar spine.  That showed a metastasis at the left T2 lamina.  The lumbar spine was negative for malignancy Subsequent MRI scans on December 4 were negative in the cervical spine and brain were all negative for malignancy.  Chest x-ray in December 4 was clear.    She was off all therapy for about 6 weeks then restarted 12/17/17 on faslodex and palbociclib.    On January 9, 2018 she began Zometa therapy.        Review of Systems   Musculoskeletal: Positive for back pain. Negative for neck pain.   Skin: Negative for rash.   Psychiatric/Behavioral: Negative for dysphoric mood. The patient is not nervous/anxious.        Objective:       Physical Exam   Constitutional: She is oriented to person, place, and time. She appears well-developed and well-nourished. No distress.   Musculoskeletal:        Arms:  Neurological: She is alert and oriented to person, place, and time.   Psychiatric: She has a normal mood and affect. Her behavior is normal. Thought content normal.   Vitals reviewed.      Assessment:     PET scan shows a T2 left laminar lesion with SUV increased from prior PET/CT value.  Increase value L5 is felt to be related to degenerative change,  The right hilar and mediastinal lymphadenopathy demonstrates only background activity.  1.1 cm right middle lobe nodule has minimal hypermetabolic activity.  There subcentimeter nodules in the medial basal segments of right and left lower lobes below threshold for detection by PET.  1. Malignant neoplasm of central portion of left breast in female, estrogen receptor positive    2. 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 Chronic midline thoracic back pain        Plan:    I reviewed the PET scan report.   Overall she has some improvement questionable mild increase at T2.  I recommended that we continue her current therapy but would consider additional imaging of that area should her symptoms persist.  At this time she appears to have some left rhomboid muscle spasm and I recommended a course of steroids as an anti-inflammatory.    She is due for her next Faslodex on February 20 I will see her then.  She will continue her injections on the [place].  She started her new cycle of Ibrance yesterday.  We will get a CBC in 2 weeks' time on February 7.  Distress Screening Results: Psychosocial Distress screening score of Distress Score: 7 noted and reviewed. No intervention indicated.Related to anxiety about her PET scan.   No Vaccines Administered.

## 2020-07-15 ENCOUNTER — DOCUMENTATION ONLY (OUTPATIENT)
Dept: INFUSION THERAPY | Facility: HOSPITAL | Age: 65
End: 2020-07-15

## 2020-07-15 NOTE — PROGRESS NOTES
Nutrition: Called pt on the phone this afternoon and discussed pts poor appetite, weight loss and lack of taste. Pt stated her dr is aware of her weight loss. Pt stated she tries to get as much proetin as she can. She has tried ONS before and said she has thrown them up. She is coming to my office tomorrow after radiation and I will provide her with samples of [employer], Boost Soothe, and handouts on current signs and symptoms. Encouraged pt to set a timer to eat every 2 hours. Encouraged pt to do baking soda mouthwash before she eats to clear the pallete. Will follow up with pt tomorrow in person and provide items.     Jayne Persaud MS, RD, LDN

## 2020-09-02 ENCOUNTER — LAB VISIT (OUTPATIENT)
Dept: LAB | Facility: HOSPITAL | Age: 65
End: 2020-09-02
Attending: INTERNAL MEDICINE
Payer: MEDICARE

## 2020-09-02 DIAGNOSIS — E86.0 DEHYDRATION: ICD-10-CM

## 2020-09-02 DIAGNOSIS — I82.492 ACUTE THROMBOEMBOLISM OF PERONEAL VEIN, LEFT: ICD-10-CM

## 2020-09-02 DIAGNOSIS — Z51.11 ENCOUNTER FOR ANTINEOPLASTIC CHEMOTHERAPY: ICD-10-CM

## 2020-09-02 DIAGNOSIS — C50.312 MALIGNANT NEOPLASM OF LOWER-INNER QUADRANT OF LEFT FEMALE BREAST: Primary | ICD-10-CM

## 2020-09-02 LAB
BACTERIA #/AREA URNS HPF: ABNORMAL /HPF
BILIRUB UR QL STRIP: NEGATIVE
CLARITY UR: ABNORMAL
COLOR UR: YELLOW
GLUCOSE UR QL STRIP: NEGATIVE
HGB UR QL STRIP: NEGATIVE
HYALINE CASTS #/AREA URNS LPF: 2 /LPF (ref 0–1)
KETONES UR QL STRIP: NEGATIVE
LEUKOCYTE ESTERASE UR QL STRIP: ABNORMAL
MICROSCOPIC COMMENT: ABNORMAL
NITRITE UR QL STRIP: POSITIVE
PH UR STRIP: 6 [PH] (ref 5–8)
PROT UR QL STRIP: NEGATIVE
RBC #/AREA URNS HPF: 3 /HPF (ref 0–4)
SP GR UR STRIP: 1.02 (ref 1–1.03)
SQUAMOUS #/AREA URNS HPF: 1 /HPF
URN SPEC COLLECT METH UR: ABNORMAL
UROBILINOGEN UR STRIP-ACNC: 1 EU/DL
WBC #/AREA URNS HPF: >100 /HPF (ref 0–5)

## 2020-09-02 PROCEDURE — 81001 URINALYSIS AUTO W/SCOPE: CPT

## 2020-09-02 PROCEDURE — 81001 URINALYSIS AUTO W/SCOPE: CPT | Mod: PN

## 2020-09-10 ENCOUNTER — LAB VISIT (OUTPATIENT)
Dept: LAB | Facility: HOSPITAL | Age: 65
End: 2020-09-10
Attending: NURSE PRACTITIONER
Payer: MEDICARE

## 2020-09-10 DIAGNOSIS — E86.0 DEHYDRATION: ICD-10-CM

## 2020-09-10 DIAGNOSIS — I82.492 ACUTE THROMBOEMBOLISM OF PERONEAL VEIN, LEFT: ICD-10-CM

## 2020-09-10 DIAGNOSIS — C50.312 MALIGNANT NEOPLASM OF LOWER-INNER QUADRANT OF LEFT FEMALE BREAST: Primary | ICD-10-CM

## 2020-09-10 DIAGNOSIS — Z51.11 ENCOUNTER FOR ANTINEOPLASTIC CHEMOTHERAPY: ICD-10-CM

## 2020-09-10 LAB
BACTERIA #/AREA URNS HPF: ABNORMAL /HPF
HYALINE CASTS #/AREA URNS LPF: 0 /LPF (ref 0–1)
MICROSCOPIC COMMENT: ABNORMAL
RBC #/AREA URNS HPF: 3 /HPF (ref 0–4)
SQUAMOUS #/AREA URNS HPF: 2 /HPF
WBC #/AREA URNS HPF: 75 /HPF (ref 0–5)

## 2020-09-10 PROCEDURE — 87088 URINE BACTERIA CULTURE: CPT

## 2020-09-10 PROCEDURE — 87077 CULTURE AEROBIC IDENTIFY: CPT

## 2020-09-10 PROCEDURE — 87077 CULTURE AEROBIC IDENTIFY: CPT | Mod: PN

## 2020-09-10 PROCEDURE — 87186 SC STD MICRODIL/AGAR DIL: CPT

## 2020-09-10 PROCEDURE — 87088 URINE BACTERIA CULTURE: CPT | Mod: PN

## 2020-09-10 PROCEDURE — 87086 URINE CULTURE/COLONY COUNT: CPT | Mod: PN

## 2020-09-10 PROCEDURE — 87186 SC STD MICRODIL/AGAR DIL: CPT | Mod: PN

## 2020-09-10 PROCEDURE — 87086 URINE CULTURE/COLONY COUNT: CPT

## 2020-09-12 LAB — BACTERIA UR CULT: ABNORMAL

## 2020-09-16 ENCOUNTER — LAB VISIT (OUTPATIENT)
Dept: LAB | Facility: HOSPITAL | Age: 65
End: 2020-09-16
Attending: INTERNAL MEDICINE
Payer: MEDICARE

## 2020-09-16 DIAGNOSIS — E86.0 DEHYDRATION: ICD-10-CM

## 2020-09-16 DIAGNOSIS — Z51.11 ENCOUNTER FOR ANTINEOPLASTIC CHEMOTHERAPY: ICD-10-CM

## 2020-09-16 DIAGNOSIS — C50.312 MALIGNANT NEOPLASM OF LOWER-INNER QUADRANT OF LEFT FEMALE BREAST: ICD-10-CM

## 2020-09-16 DIAGNOSIS — I82.492 ACUTE THROMBOEMBOLISM OF PERONEAL VEIN, LEFT: Primary | ICD-10-CM

## 2020-09-16 LAB
ALBUMIN SERPL BCP-MCNC: 3.7 G/DL (ref 3.5–5.2)
ALP SERPL-CCNC: 40 U/L (ref 38–145)
ALT SERPL W/O P-5'-P-CCNC: 15 U/L (ref 0–35)
ANION GAP SERPL CALC-SCNC: 7 MMOL/L (ref 8–16)
AST SERPL-CCNC: 31 U/L (ref 14–36)
BILIRUB SERPL-MCNC: 0.9 MG/DL (ref 0.2–1.3)
BUN SERPL-MCNC: 7 MG/DL (ref 7–18)
CALCIUM SERPL-MCNC: 9 MG/DL (ref 8.4–10.2)
CHLORIDE SERPL-SCNC: 101 MMOL/L (ref 95–110)
CO2 SERPL-SCNC: 24 MMOL/L (ref 22–31)
CREAT SERPL-MCNC: 0.46 MG/DL (ref 0.5–1.4)
EST. GFR  (AFRICAN AMERICAN): >60 ML/MIN/1.73 M^2
EST. GFR  (NON AFRICAN AMERICAN): >60 ML/MIN/1.73 M^2
GLUCOSE SERPL-MCNC: 98 MG/DL (ref 70–110)
POTASSIUM SERPL-SCNC: 5.2 MMOL/L (ref 3.5–5.1)
PROT SERPL-MCNC: 7 G/DL (ref 6–8.4)
SODIUM SERPL-SCNC: 132 MMOL/L (ref 136–145)

## 2020-09-16 PROCEDURE — 80053 COMPREHEN METABOLIC PANEL: CPT

## 2020-09-16 PROCEDURE — 80053 COMPREHEN METABOLIC PANEL: CPT | Mod: PN

## 2020-09-16 PROCEDURE — 36415 COLL VENOUS BLD VENIPUNCTURE: CPT | Mod: PN

## 2020-09-16 PROCEDURE — 86300 IMMUNOASSAY TUMOR CA 15-3: CPT

## 2020-09-21 LAB — CANCER AG27-29 SERPL-ACNC: 133 U/ML

## 2020-10-12 ENCOUNTER — LAB VISIT (OUTPATIENT)
Dept: LAB | Facility: HOSPITAL | Age: 65
End: 2020-10-12
Attending: NURSE PRACTITIONER
Payer: MEDICARE

## 2020-10-12 DIAGNOSIS — Z51.11 ENCOUNTER FOR ANTINEOPLASTIC CHEMOTHERAPY: ICD-10-CM

## 2020-10-12 DIAGNOSIS — I82.492 ACUTE THROMBOEMBOLISM OF PERONEAL VEIN, LEFT: ICD-10-CM

## 2020-10-12 DIAGNOSIS — C50.312 MALIGNANT NEOPLASM OF LOWER-INNER QUADRANT OF LEFT FEMALE BREAST: Primary | ICD-10-CM

## 2020-10-12 DIAGNOSIS — E86.0 DEHYDRATION: ICD-10-CM

## 2020-10-12 LAB
ALBUMIN SERPL BCP-MCNC: 3.9 G/DL (ref 3.5–5.2)
ALP SERPL-CCNC: 75 U/L (ref 38–145)
ALT SERPL W/O P-5'-P-CCNC: 15 U/L (ref 0–35)
ANION GAP SERPL CALC-SCNC: 6 MMOL/L (ref 8–16)
AST SERPL-CCNC: 24 U/L (ref 14–36)
BASOPHILS # BLD AUTO: 0.02 K/UL (ref 0–0.2)
BASOPHILS NFR BLD: 0.4 % (ref 0–1.9)
BILIRUB SERPL-MCNC: 0.6 MG/DL (ref 0.2–1.3)
BUN SERPL-MCNC: 11 MG/DL (ref 7–18)
CALCIUM SERPL-MCNC: 9.2 MG/DL (ref 8.4–10.2)
CHLORIDE SERPL-SCNC: 100 MMOL/L (ref 95–110)
CO2 SERPL-SCNC: 29 MMOL/L (ref 22–31)
CREAT SERPL-MCNC: 0.43 MG/DL (ref 0.5–1.4)
DIFFERENTIAL METHOD: ABNORMAL
EOSINOPHIL # BLD AUTO: 0.1 K/UL (ref 0–0.5)
EOSINOPHIL NFR BLD: 2.3 % (ref 0–8)
ERYTHROCYTE [DISTWIDTH] IN BLOOD BY AUTOMATED COUNT: 19.9 % (ref 11.5–14.5)
EST. GFR  (AFRICAN AMERICAN): >60 ML/MIN/1.73 M^2
EST. GFR  (NON AFRICAN AMERICAN): >60 ML/MIN/1.73 M^2
GLUCOSE SERPL-MCNC: 89 MG/DL (ref 70–110)
HCT VFR BLD AUTO: 38.9 % (ref 37–48.5)
HGB BLD-MCNC: 13 G/DL (ref 12–16)
IMM GRANULOCYTES # BLD AUTO: 0.04 K/UL (ref 0–0.04)
IMM GRANULOCYTES NFR BLD AUTO: 0.8 % (ref 0–0.5)
LYMPHOCYTES # BLD AUTO: 1.2 K/UL (ref 1–4.8)
LYMPHOCYTES NFR BLD: 22.6 % (ref 18–48)
MCH RBC QN AUTO: 35.2 PG (ref 27–31)
MCHC RBC AUTO-ENTMCNC: 33.4 G/DL (ref 32–36)
MCV RBC AUTO: 105 FL (ref 82–98)
MONOCYTES # BLD AUTO: 0.5 K/UL (ref 0.3–1)
MONOCYTES NFR BLD: 8.8 % (ref 4–15)
NEUTROPHILS # BLD AUTO: 3.3 K/UL (ref 1.8–7.7)
NEUTROPHILS NFR BLD: 65.1 % (ref 38–73)
NRBC BLD-RTO: 0 /100 WBC
PLATELET # BLD AUTO: 341 K/UL (ref 150–350)
PMV BLD AUTO: 9.5 FL (ref 9.2–12.9)
POTASSIUM SERPL-SCNC: 4.5 MMOL/L (ref 3.5–5.1)
PROT SERPL-MCNC: 7.3 G/DL (ref 6–8.4)
RBC # BLD AUTO: 3.69 M/UL (ref 4–5.4)
SODIUM SERPL-SCNC: 135 MMOL/L (ref 136–145)
WBC # BLD AUTO: 5.13 K/UL (ref 3.9–12.7)

## 2020-10-12 PROCEDURE — 86300 IMMUNOASSAY TUMOR CA 15-3: CPT

## 2020-10-12 PROCEDURE — 85025 COMPLETE CBC W/AUTO DIFF WBC: CPT

## 2020-10-12 PROCEDURE — 80053 COMPREHEN METABOLIC PANEL: CPT | Mod: PN

## 2020-10-12 PROCEDURE — 80053 COMPREHEN METABOLIC PANEL: CPT

## 2020-10-12 PROCEDURE — 85025 COMPLETE CBC W/AUTO DIFF WBC: CPT | Mod: PN

## 2020-10-12 PROCEDURE — 36415 COLL VENOUS BLD VENIPUNCTURE: CPT | Mod: PN

## 2020-10-15 LAB — CANCER AG27-29 SERPL-ACNC: 155 U/ML

## 2021-03-05 NOTE — TELEPHONE ENCOUNTER
Refill readiness for Ibrance confirmed with patient; name/ confirmed; no missed doses; no new medications; no side effects noted; address confirmed for 2/15 shipment and  delivery.  Co-pay $0.00.      She plans to start new cycle on .  She will be out of town for 3 weeks start on .  Will f/u for next cycle refill when she returns.     negative...

## 2021-05-12 ENCOUNTER — PATIENT MESSAGE (OUTPATIENT)
Dept: RESEARCH | Facility: HOSPITAL | Age: 66
End: 2021-05-12

## (undated) DEVICE — BLADE SURG CARBON STEEL SZ11

## (undated) DEVICE — SUT 3-0 12-18IN SILK

## (undated) DEVICE — GOWN SURG 2XL DISP TIE BACK

## (undated) DEVICE — SUT MCRYL PLUS 4-0 PS2 27IN

## (undated) DEVICE — CUP MEDICINE STERILE 2OZ

## (undated) DEVICE — SYR DISP LL 5CC

## (undated) DEVICE — SEE MEDLINE ITEM 157131

## (undated) DEVICE — SUT VICRYL 3-0 27 SH

## (undated) DEVICE — SOL NACL 0.9% INJ PF/50151

## (undated) DEVICE — DRAPE C ARM 42 X 120 10/BX

## (undated) DEVICE — ELECTRODE REM PLYHSV RETURN 9

## (undated) DEVICE — TRAY MINOR GEN SURG

## (undated) DEVICE — DRAPE THYROID WITH ARMBOARD

## (undated) DEVICE — SUT PROLENE 0 MO6 30IN BLUE

## (undated) DEVICE — SYR ONLY LUER LOCK 20CC

## (undated) DEVICE — SET DECANTER MEDICHOICE

## (undated) DEVICE — SEE MEDLINE ITEM 157117